# Patient Record
Sex: MALE | Race: WHITE | NOT HISPANIC OR LATINO | Employment: FULL TIME | RURAL
[De-identification: names, ages, dates, MRNs, and addresses within clinical notes are randomized per-mention and may not be internally consistent; named-entity substitution may affect disease eponyms.]

---

## 2017-08-30 ENCOUNTER — CONVERSION ENCOUNTER (OUTPATIENT)
Dept: CARDIOLOGY | Facility: CLINIC | Age: 52
End: 2017-08-30

## 2017-09-07 ENCOUNTER — CONVERSION ENCOUNTER (OUTPATIENT)
Dept: CARDIOLOGY | Facility: CLINIC | Age: 52
End: 2017-09-07

## 2017-09-07 LAB
ALBUMIN SERPL-MCNC: 4.8 G/DL (ref 3.6–5.1)
ALBUMIN/GLOB SERPL: ABNORMAL {RATIO} (ref 1–2.5)
ALP SERPL-CCNC: 62 UNITS/L (ref 40–115)
ALT SERPL-CCNC: 47 UNITS/L (ref 9–46)
AST SERPL-CCNC: 30 UNITS/L (ref 10–35)
BASOPHILS # BLD AUTO: ABNORMAL 10*3/MM3 (ref 0–200)
BASOPHILS NFR BLD AUTO: 0.9 %
BILIRUB SERPL-MCNC: 0.6 MG/DL (ref 0.2–1.2)
BUN SERPL-MCNC: 16 MG/DL (ref 7–25)
BUN/CREAT SERPL: ABNORMAL (ref 6–22)
CALCIUM SERPL-MCNC: 9.2 MG/DL (ref 8.6–10.3)
CHLORIDE SERPL-SCNC: 103 MMOL/L (ref 98–110)
CHOLEST SERPL-MCNC: 209 MG/DL
CHOLEST/HDLC SERPL: ABNORMAL {RATIO}
CO2 CONTENT VENOUS: 28 MMOL/L (ref 20–31)
CONV NEUTROPHILS/100 LEUKOCYTES IN BODY FLUID BY MANUAL COUNT: 64.7 %
CONV TOTAL PROTEIN: 7.2 G/DL (ref 6.1–8.1)
CREAT UR-MCNC: 1.11 MG/DL (ref 0.7–1.33)
EOSINOPHIL # BLD AUTO: 1.5 %
EOSINOPHIL # BLD AUTO: ABNORMAL 10*3/MM3 (ref 15–500)
ERYTHROCYTE [DISTWIDTH] IN BLOOD BY AUTOMATED COUNT: 13.7 % (ref 11–15)
GLOBULIN UR ELPH-MCNC: ABNORMAL G/DL (ref 1.9–3.7)
GLUCOSE SERPL-MCNC: 110 MG/DL (ref 65–99)
HCT VFR BLD AUTO: 43.2 % (ref 38.5–50)
HDLC SERPL-MCNC: 32 MG/DL
HGB BLD-MCNC: 15.5 G/DL (ref 13.2–17.1)
LDLC SERPL CALC-MCNC: ABNORMAL MG/DL
LYMPHOCYTES # BLD AUTO: ABNORMAL 10*3/MM3 (ref 850–3900)
LYMPHOCYTES NFR BLD AUTO: 25.5 %
MCH RBC QN AUTO: 29.8 PG (ref 27–33)
MCHC RBC AUTO-ENTMCNC: ABNORMAL % (ref 32–36)
MCV RBC AUTO: 83.1 FL (ref 80–100)
MONOCYTES # BLD AUTO: ABNORMAL 10*3/MICROLITER (ref 200–950)
MONOCYTES NFR BLD AUTO: 7.4 %
NEUTROPHILS # BLD AUTO: ABNORMAL 10*3/MM3 (ref 1500–7800)
PLATELET # BLD AUTO: ABNORMAL 10*3/MM3 (ref 140–400)
PMV BLD AUTO: 10.1 FL (ref 7.5–12.5)
POTASSIUM SERPL-SCNC: 4.2 MMOL/L (ref 3.5–5.3)
RBC # BLD AUTO: ABNORMAL 10*6/MM3 (ref 4.2–5.8)
SODIUM SERPL-SCNC: 139 MMOL/L (ref 135–146)
TRIGL SERPL-MCNC: 336 MG/DL
WBC # BLD AUTO: ABNORMAL K/UL (ref 3.8–10.8)

## 2017-12-07 ENCOUNTER — HOSPITAL ENCOUNTER (OUTPATIENT)
Dept: OTHER | Facility: HOSPITAL | Age: 52
Discharge: HOME | End: 2017-12-07
Attending: INTERNAL MEDICINE | Admitting: INTERNAL MEDICINE

## 2018-09-12 ENCOUNTER — CONVERSION ENCOUNTER (OUTPATIENT)
Dept: CARDIOLOGY | Facility: CLINIC | Age: 53
End: 2018-09-12

## 2018-10-22 ENCOUNTER — CONVERSION ENCOUNTER (OUTPATIENT)
Dept: FAMILY MEDICINE CLINIC | Facility: CLINIC | Age: 53
End: 2018-10-22

## 2018-10-22 LAB — HBA1C MFR BLD: 6 %

## 2018-12-10 ENCOUNTER — HOSPITAL ENCOUNTER (OUTPATIENT)
Dept: CT IMAGING | Facility: HOSPITAL | Age: 53
Discharge: HOME | End: 2018-12-10
Attending: INTERNAL MEDICINE | Admitting: INTERNAL MEDICINE

## 2018-12-10 LAB
CREAT BLDA-MCNC: 1 MG/DL (ref 0.6–1.3)
CREAT UR-MCNC: 1.2 MG/DL (ref 0.7–1.2)

## 2019-06-04 VITALS
SYSTOLIC BLOOD PRESSURE: 141 MMHG | HEIGHT: 71 IN | DIASTOLIC BLOOD PRESSURE: 82 MMHG | DIASTOLIC BLOOD PRESSURE: 80 MMHG | WEIGHT: 186 LBS | HEART RATE: 77 BPM | WEIGHT: 186 LBS | RESPIRATION RATE: 18 BRPM | HEART RATE: 75 BPM | RESPIRATION RATE: 18 BRPM | SYSTOLIC BLOOD PRESSURE: 150 MMHG | BODY MASS INDEX: 26.04 KG/M2

## 2019-09-11 ENCOUNTER — OFFICE VISIT (OUTPATIENT)
Dept: CARDIOLOGY | Facility: CLINIC | Age: 54
End: 2019-09-11

## 2019-09-11 VITALS
BODY MASS INDEX: 25.48 KG/M2 | DIASTOLIC BLOOD PRESSURE: 83 MMHG | SYSTOLIC BLOOD PRESSURE: 139 MMHG | WEIGHT: 182 LBS | RESPIRATION RATE: 18 BRPM | OXYGEN SATURATION: 99 % | HEART RATE: 84 BPM | HEIGHT: 71 IN

## 2019-09-11 DIAGNOSIS — I35.1 NONRHEUMATIC AORTIC VALVE INSUFFICIENCY: Primary | ICD-10-CM

## 2019-09-11 DIAGNOSIS — I10 ESSENTIAL HYPERTENSION: ICD-10-CM

## 2019-09-11 PROCEDURE — 99214 OFFICE O/P EST MOD 30 MIN: CPT | Performed by: INTERNAL MEDICINE

## 2019-09-11 PROCEDURE — 93000 ELECTROCARDIOGRAM COMPLETE: CPT | Performed by: INTERNAL MEDICINE

## 2019-09-11 RX ORDER — METOPROLOL SUCCINATE 25 MG/1
25 TABLET, EXTENDED RELEASE ORAL DAILY
Refills: 6 | COMMUNITY
Start: 2019-08-28 | End: 2019-10-21 | Stop reason: SDUPTHER

## 2019-09-11 RX ORDER — LISINOPRIL 5 MG/1
5 TABLET ORAL DAILY
Refills: 3 | COMMUNITY
Start: 2019-09-04 | End: 2020-02-25

## 2019-09-11 RX ORDER — ATORVASTATIN CALCIUM 20 MG/1
20 TABLET, FILM COATED ORAL
Refills: 6 | COMMUNITY
Start: 2019-07-31 | End: 2019-10-26 | Stop reason: SDUPTHER

## 2019-09-11 NOTE — PROGRESS NOTES
Subjective:     Encounter Date:09/11/2019      Patient ID: Sami Lin is a 54 y.o. male.    Chief Complaint:    Dear Dr. Mcintosh,    Mr. lin  is a 54-year-old white male with history of aortic valve disorder with aortic insufficiency presents to my office for followup.  Patient is currently stable without symptoms of chest pain or shortness of breath at rest or exertion.  No palpitations But has occasional dizziness  without any syncope.No complaints of any PND or orthopnea.  No swelling of the feet.  Patient has been taking all is medicines.    Problem list  Aortic regurgitation, moderate  Ascending aortic aneurysm  Hypertension  Prior two echocardiograms were reviewed with the patient/aneurysm size on most recent echo 4.3 cm  Moderate aortic insufficiency  CT angiogram of the aorta shows aortic root size of 4.3 cm and ascending aorta of 4.7 cm  Goal systolic blood pressure less than 130  goal LDL less than 70  Continue home blood pressure monitoring    Repeat echocardiogram to assess LV size and severity of the aortic regurgitation  Labs with primary care physician office  follow-up in office in 1 year        The following portions of the patient's history were reviewed and updated as appropriate: allergies, current medications, past family history, past medical history, past social history, past surgical history and problem list.    No Known Allergies      Current Outpatient Medications:   •  atorvastatin (LIPITOR) 20 MG tablet, Take 20 mg by mouth every night at bedtime., Disp: , Rfl: 6  •  lisinopril (PRINIVIL,ZESTRIL) 5 MG tablet, Take 5 mg by mouth Daily., Disp: , Rfl: 3  •  metoprolol succinate XL (TOPROL-XL) 25 MG 24 hr tablet, Take 25 mg by mouth Daily., Disp: , Rfl: 6    Family History   Problem Relation Age of Onset   • Hypertension Mother    • Hypertension Father        Past Medical History:   Diagnosis Date   • Heart murmur    • Hyperlipidemia    • Hypertension        Past Surgical  History:   Procedure Laterality Date   • APPENDECTOMY         Social History     Socioeconomic History   • Marital status:      Spouse name: Not on file   • Number of children: Not on file   • Years of education: Not on file   • Highest education level: Not on file   Tobacco Use   • Smoking status: Former Smoker     Types: Cigarettes   • Smokeless tobacco: Never Used   Substance and Sexual Activity   • Alcohol use: Yes     Comment: Rare    • Drug use: No       Review of Systems   Constitution: Negative for chills, decreased appetite and malaise/fatigue.   HENT: Negative for congestion.    Eyes: Negative for blurred vision and double vision.   Cardiovascular: Negative for chest pain, dyspnea on exertion, irregular heartbeat, leg swelling, near-syncope, orthopnea, palpitations, paroxysmal nocturnal dyspnea and syncope.   Respiratory: Negative for cough and shortness of breath.    Hematologic/Lymphatic: Negative for adenopathy. Does not bruise/bleed easily.   Skin: Negative for rash.   Gastrointestinal: Negative for bloating and abdominal pain.   Neurological: Negative for dizziness and focal weakness.            Objective:         Vitals:    09/11/19 1318   BP: 139/83   Pulse: 84   Resp: 18   SpO2: 99%       Physical Exam   Constitutional: He is oriented to person, place, and time. He appears well-developed and well-nourished.   HENT:   Head: Normocephalic and atraumatic.   Eyes: Conjunctivae are normal. Pupils are equal, round, and reactive to light.   Neck: Normal range of motion. Neck supple. No thyromegaly present.   Cardiovascular: Normal rate, regular rhythm, S1 normal, S2 normal and intact distal pulses.   Pulmonary/Chest: Effort normal and breath sounds normal.   Abdominal: Soft. Bowel sounds are normal.   Musculoskeletal: He exhibits no edema.   Neurological: He is alert and oriented to person, place, and time.   Skin: Skin is warm.   Nursing note and vitals reviewed.        EKG: normal EKG, normal  sinus rhythm, unchanged from previous tracings, normal sinus rhythm, nonspecific ST and T waves changes.

## 2019-10-21 RX ORDER — METOPROLOL SUCCINATE 25 MG/1
TABLET, EXTENDED RELEASE ORAL
Qty: 30 TABLET | Refills: 6 | Status: SHIPPED | OUTPATIENT
Start: 2019-10-21 | End: 2020-04-06

## 2019-10-28 RX ORDER — ATORVASTATIN CALCIUM 20 MG/1
TABLET, FILM COATED ORAL
Qty: 90 TABLET | Refills: 3 | Status: SHIPPED | OUTPATIENT
Start: 2019-10-28 | End: 2020-10-15 | Stop reason: SDUPTHER

## 2019-12-09 ENCOUNTER — HOSPITAL ENCOUNTER (OUTPATIENT)
Dept: CARDIOLOGY | Facility: HOSPITAL | Age: 54
Discharge: HOME | End: 2019-12-09
Admitting: INTERNAL MEDICINE

## 2019-12-09 VITALS — HEIGHT: 71 IN | WEIGHT: 182 LBS | BODY MASS INDEX: 25.48 KG/M2

## 2019-12-09 DIAGNOSIS — I35.1 NONRHEUMATIC AORTIC VALVE INSUFFICIENCY: ICD-10-CM

## 2019-12-09 LAB
BH CV ECHO MEAS - ACS: 1.8 CM
BH CV ECHO MEAS - AI DEC SLOPE: 276.2 CM/SEC^2
BH CV ECHO MEAS - AI DEC TIME: 1.7 SEC
BH CV ECHO MEAS - AI MAX PG: 87.1 MMHG
BH CV ECHO MEAS - AI MAX VEL: 466.1 CM/SEC
BH CV ECHO MEAS - AI P1/2T: 494.4 MSEC
BH CV ECHO MEAS - AO MAX PG (FULL): 8.2 MMHG
BH CV ECHO MEAS - AO MAX PG: 13.4 MMHG
BH CV ECHO MEAS - AO MEAN PG (FULL): 3 MMHG
BH CV ECHO MEAS - AO MEAN PG: 6.2 MMHG
BH CV ECHO MEAS - AO ROOT AREA (BSA CORRECTED): 2.1
BH CV ECHO MEAS - AO ROOT AREA: 13.8 CM^2
BH CV ECHO MEAS - AO ROOT DIAM: 4.2 CM
BH CV ECHO MEAS - AO V2 MAX: 182.9 CM/SEC
BH CV ECHO MEAS - AO V2 MEAN: 115 CM/SEC
BH CV ECHO MEAS - AO V2 VTI: 38.7 CM
BH CV ECHO MEAS - AVA(I,A): 2.7 CM^2
BH CV ECHO MEAS - AVA(I,D): 2.7 CM^2
BH CV ECHO MEAS - AVA(V,A): 2.1 CM^2
BH CV ECHO MEAS - AVA(V,D): 2.1 CM^2
BH CV ECHO MEAS - BSA(HAYCOCK): 2 M^2
BH CV ECHO MEAS - BSA: 2 M^2
BH CV ECHO MEAS - BZI_BMI: 25.4 KILOGRAMS/M^2
BH CV ECHO MEAS - BZI_METRIC_HEIGHT: 180.3 CM
BH CV ECHO MEAS - BZI_METRIC_WEIGHT: 82.6 KG
BH CV ECHO MEAS - EDV(CUBED): 158.5 ML
BH CV ECHO MEAS - EDV(MOD-SP4): 108.5 ML
BH CV ECHO MEAS - EDV(TEICH): 142 ML
BH CV ECHO MEAS - EF(CUBED): 71.5 %
BH CV ECHO MEAS - EF(MOD-BP): 60 %
BH CV ECHO MEAS - EF(MOD-SP4): 60.1 %
BH CV ECHO MEAS - EF(TEICH): 62.7 %
BH CV ECHO MEAS - ESV(CUBED): 45.1 ML
BH CV ECHO MEAS - ESV(MOD-SP4): 43.3 ML
BH CV ECHO MEAS - ESV(TEICH): 53 ML
BH CV ECHO MEAS - FS: 34.2 %
BH CV ECHO MEAS - IVS/LVPW: 1
BH CV ECHO MEAS - IVSD: 1 CM
BH CV ECHO MEAS - LA DIMENSION: 2.2 CM
BH CV ECHO MEAS - LA/AO: 0.52
BH CV ECHO MEAS - LV DIASTOLIC VOL/BSA (35-75): 53.6 ML/M^2
BH CV ECHO MEAS - LV MASS(C)D: 203.4 GRAMS
BH CV ECHO MEAS - LV MASS(C)DI: 100.4 GRAMS/M^2
BH CV ECHO MEAS - LV MAX PG: 5.2 MMHG
BH CV ECHO MEAS - LV MEAN PG: 3.3 MMHG
BH CV ECHO MEAS - LV SYSTOLIC VOL/BSA (12-30): 21.4 ML/M^2
BH CV ECHO MEAS - LV V1 MAX: 114.3 CM/SEC
BH CV ECHO MEAS - LV V1 MEAN: 84.8 CM/SEC
BH CV ECHO MEAS - LV V1 VTI: 31 CM
BH CV ECHO MEAS - LVIDD: 5.4 CM
BH CV ECHO MEAS - LVIDS: 3.6 CM
BH CV ECHO MEAS - LVOT AREA: 3.4 CM^2
BH CV ECHO MEAS - LVOT DIAM: 2.1 CM
BH CV ECHO MEAS - LVPWD: 0.96 CM
BH CV ECHO MEAS - MR MAX PG: 35.9 MMHG
BH CV ECHO MEAS - MR MAX VEL: 298.3 CM/SEC
BH CV ECHO MEAS - MV A MAX VEL: 88.4 CM/SEC
BH CV ECHO MEAS - MV E MAX VEL: 108 CM/SEC
BH CV ECHO MEAS - MV E/A: 1.2
BH CV ECHO MEAS - MV MAX PG: 3.3 MMHG
BH CV ECHO MEAS - MV MEAN PG: 1.4 MMHG
BH CV ECHO MEAS - MV V2 MAX: 91.2 CM/SEC
BH CV ECHO MEAS - MV V2 MEAN: 53.9 CM/SEC
BH CV ECHO MEAS - MV V2 VTI: 29.3 CM
BH CV ECHO MEAS - MVA(VTI): 3.6 CM^2
BH CV ECHO MEAS - PA ACC TIME: 0.12 SEC
BH CV ECHO MEAS - PA MAX PG: 4.1 MMHG
BH CV ECHO MEAS - PA PR(ACCEL): 26.3 MMHG
BH CV ECHO MEAS - PA V2 MAX: 101.1 CM/SEC
BH CV ECHO MEAS - PI END-D VEL: 80.2 CM/SEC
BH CV ECHO MEAS - RAP SYSTOLE: 10 MMHG
BH CV ECHO MEAS - RVDD(2D): 1.7 CM
BH CV ECHO MEAS - RVDD: 1.7 CM
BH CV ECHO MEAS - RVSP: 49.5 MMHG
BH CV ECHO MEAS - SI(AO): 263.6 ML/M^2
BH CV ECHO MEAS - SI(CUBED): 56 ML/M^2
BH CV ECHO MEAS - SI(LVOT): 52.3 ML/M^2
BH CV ECHO MEAS - SI(MOD-SP4): 32.2 ML/M^2
BH CV ECHO MEAS - SI(TEICH): 44 ML/M^2
BH CV ECHO MEAS - SV(AO): 534.1 ML
BH CV ECHO MEAS - SV(CUBED): 113.4 ML
BH CV ECHO MEAS - SV(LVOT): 106 ML
BH CV ECHO MEAS - SV(MOD-SP4): 65.2 ML
BH CV ECHO MEAS - SV(TEICH): 89.1 ML
BH CV ECHO MEAS - TR MAX VEL: 269.3 CM/SEC
LV EF 2D ECHO EST: 60 %
MAXIMAL PREDICTED HEART RATE: 167 BPM
STRESS TARGET HR: 142 BPM

## 2019-12-09 PROCEDURE — 93306 TTE W/DOPPLER COMPLETE: CPT | Performed by: INTERNAL MEDICINE

## 2019-12-09 PROCEDURE — 93306 TTE W/DOPPLER COMPLETE: CPT

## 2020-02-25 RX ORDER — LISINOPRIL 5 MG/1
TABLET ORAL
Qty: 90 TABLET | Refills: 3 | Status: SHIPPED | OUTPATIENT
Start: 2020-02-25 | End: 2021-02-11

## 2020-04-06 RX ORDER — METOPROLOL SUCCINATE 25 MG/1
TABLET, EXTENDED RELEASE ORAL
Qty: 30 TABLET | Refills: 6 | Status: SHIPPED | OUTPATIENT
Start: 2020-04-06 | End: 2020-09-29

## 2020-09-16 ENCOUNTER — OFFICE VISIT (OUTPATIENT)
Dept: CARDIOLOGY | Facility: CLINIC | Age: 55
End: 2020-09-16

## 2020-09-16 VITALS
HEIGHT: 71 IN | RESPIRATION RATE: 18 BRPM | OXYGEN SATURATION: 100 % | DIASTOLIC BLOOD PRESSURE: 79 MMHG | HEART RATE: 84 BPM | BODY MASS INDEX: 25.48 KG/M2 | SYSTOLIC BLOOD PRESSURE: 139 MMHG | WEIGHT: 182 LBS

## 2020-09-16 DIAGNOSIS — I71.20 THORACIC AORTIC ANEURYSM WITHOUT RUPTURE (HCC): ICD-10-CM

## 2020-09-16 DIAGNOSIS — I35.1 NONRHEUMATIC AORTIC VALVE INSUFFICIENCY: Primary | ICD-10-CM

## 2020-09-16 DIAGNOSIS — E78.2 MIXED HYPERLIPIDEMIA: ICD-10-CM

## 2020-09-16 DIAGNOSIS — R01.1 HEART MURMUR: ICD-10-CM

## 2020-09-16 DIAGNOSIS — I10 ESSENTIAL HYPERTENSION: ICD-10-CM

## 2020-09-16 PROCEDURE — 93000 ELECTROCARDIOGRAM COMPLETE: CPT | Performed by: INTERNAL MEDICINE

## 2020-09-16 PROCEDURE — 99214 OFFICE O/P EST MOD 30 MIN: CPT | Performed by: INTERNAL MEDICINE

## 2020-09-16 NOTE — PROGRESS NOTES
"Cardiology Office Visit      Encounter Date:  09/16/2020    Patient ID:   Sami Quiroga is a 55 y.o. male.    Reason For Followup:  Aortic regurgitation and ascending aortic aneurysm    Brief Clinical History:  Dear Dr. Mcintosh, Renard Pope MD    I had the pleasure of seeing Sami Quiroga today. As you are well aware, this is a 55 y.o. male  with history of aortic valve disorder with aortic insufficiency presents to my office for followup.  Patient is currently stable without symptoms of chest pain or shortness of breath at rest or exertion.  No palpitations But has occasional dizziness  without any syncope.No complaints of any PND or orthopnea.  No swelling of the feet.  Patient has been taking all is medicines.        Interval History:  Denies any symptoms of chest pain shortness of breath dizziness or syncope    Assessment & Plan    Impressions:  Aortic regurgitation, moderate  Ascending aortic aneurysm  Hypertension  Prior two echocardiograms were reviewed with the patient/aneurysm size on most recent echo 4.3 cm  Moderate aortic insufficiency  CT angiogram of the aorta shows aortic root size of 4.3 cm and ascending aorta of 4.7 cm    Recommendations:  Goal systolic blood pressure less than 130  goal LDL less than 70  Continue home blood pressure monitoring  Advised patient to call back office in 2 weeks with blood pressure readings  Consider increasing the dose of lisinopril if the blood pressure is still elevated  Repeat echocardiogram to assess LV size and severity of the aortic regurgitation  Labs with primary care physician office  follow-up in office in 1 year  Objective:    Vitals:  Vitals:    09/16/20 1300   BP: 139/79   BP Location: Left arm   Pulse: 84   Resp: 18   SpO2: 100%   Weight: 82.6 kg (182 lb)   Height: 180.3 cm (71\")       Physical Exam:    General: Alert, cooperative, no distress, appears stated age  Head:  Normocephalic, atraumatic, mucous membranes moist  Eyes:  Conjunctiva/corneas " clear, EOM's intact     Neck:  Supple,  no adenopathy;      Lungs: Clear to auscultation bilaterally, no wheezes rhonchi rales are noted  Chest wall: No tenderness  Heart::  Regular rate and rhythm, S1 and S2 normal, 2 x 6 diastolic heart murmur left sternal border  Abdomen: Soft, non-tender, nondistended bowel sounds active  Extremities: No cyanosis, clubbing, or edema  Pulses: 2+ and symmetric all extremities  Skin:  No rashes or lesions  Neuro/psych: A&O x3. CN II through XII are grossly intact with appropriate affect      Allergies:  No Known Allergies    Medication Review:     Current Outpatient Medications:   •  atorvastatin (LIPITOR) 20 MG tablet, TAKE 1 TABLET BY MOUTH AT BEDTIME, Disp: 90 tablet, Rfl: 3  •  lisinopril (PRINIVIL,ZESTRIL) 5 MG tablet, TAKE 1 TABLET BY MOUTH EVERY DAY, Disp: 90 tablet, Rfl: 3  •  metoprolol succinate XL (TOPROL-XL) 25 MG 24 hr tablet, TAKE 1 TABLET BY MOUTH EVERY DAY, Disp: 30 tablet, Rfl: 6    Family History:  Family History   Problem Relation Age of Onset   • Hypertension Mother    • Hypertension Father        Past Medical History:  Past Medical History:   Diagnosis Date   • Heart murmur    • Hyperlipidemia    • Hypertension        Past surgical History:  Past Surgical History:   Procedure Laterality Date   • APPENDECTOMY         Social History:  Social History     Socioeconomic History   • Marital status:      Spouse name: Not on file   • Number of children: Not on file   • Years of education: Not on file   • Highest education level: Not on file   Tobacco Use   • Smoking status: Former Smoker     Types: Cigarettes   • Smokeless tobacco: Never Used   Substance and Sexual Activity   • Alcohol use: Yes     Comment: Rare    • Drug use: No       Review of Systems:  The following systems were reviewed as they relate to the cardiovascular system: Constitutional, Eyes, ENT, Cardiovascular, Respiratory, Gastrointestinal, Integumentary, Neurological, Psychiatric, Hematologic,  Endocrine, Musculoskeletal, and Genitourinary. The pertinent cardiovascular findings are reported above with all other cardiovascular points within those systems being negative.    Diagnostic Study Review:     Current Electrocardiogram:    ECG 12 Lead    Date/Time: 9/16/2020 7:36 PM  Performed by: Brittanie Lopez MD  Authorized by: Brittanie Lopez MD   Comparison: compared with previous ECG   Similar to previous ECG  Rhythm: sinus rhythm  Rate: normal  BPM: 84  Conduction: conduction normal  QRS axis: normal  Other findings: non-specific ST-T wave changes    Clinical impression: abnormal EKG              NOTE: The following portions of the patient's history were reviewed and updated this visit as appropriate: allergies, current medications, past family history, past medical history, past social history, past surgical history and problem list.

## 2020-09-28 PROBLEM — I77.810 AORTIC ROOT DILATION: Status: ACTIVE | Noted: 2020-09-28

## 2020-09-28 PROBLEM — R35.1 NOCTURIA: Status: ACTIVE | Noted: 2020-09-28

## 2020-09-28 PROBLEM — J30.9 ALLERGIC RHINITIS: Status: ACTIVE | Noted: 2020-09-28

## 2020-09-28 PROBLEM — F41.9 ANXIETY: Status: ACTIVE | Noted: 2020-09-28

## 2020-09-29 RX ORDER — METOPROLOL SUCCINATE 25 MG/1
TABLET, EXTENDED RELEASE ORAL
Qty: 90 TABLET | Refills: 2 | Status: SHIPPED | OUTPATIENT
Start: 2020-09-29 | End: 2021-08-20

## 2020-10-05 ENCOUNTER — OFFICE VISIT (OUTPATIENT)
Dept: FAMILY MEDICINE CLINIC | Facility: CLINIC | Age: 55
End: 2020-10-05

## 2020-10-05 VITALS
DIASTOLIC BLOOD PRESSURE: 82 MMHG | SYSTOLIC BLOOD PRESSURE: 130 MMHG | BODY MASS INDEX: 25.9 KG/M2 | HEART RATE: 79 BPM | RESPIRATION RATE: 18 BRPM | OXYGEN SATURATION: 98 % | HEIGHT: 71 IN | TEMPERATURE: 97.3 F | WEIGHT: 185 LBS

## 2020-10-05 DIAGNOSIS — Z12.5 ENCOUNTER FOR PROSTATE CANCER SCREENING: ICD-10-CM

## 2020-10-05 DIAGNOSIS — I10 ESSENTIAL HYPERTENSION: ICD-10-CM

## 2020-10-05 DIAGNOSIS — E78.2 MIXED HYPERLIPIDEMIA: ICD-10-CM

## 2020-10-05 DIAGNOSIS — R73.9 HYPERGLYCEMIA: ICD-10-CM

## 2020-10-05 DIAGNOSIS — Z00.00 PHYSICAL EXAM: Primary | ICD-10-CM

## 2020-10-05 LAB
BILIRUB BLD-MCNC: NEGATIVE MG/DL
CLARITY, POC: CLEAR
COLOR UR: YELLOW
GLUCOSE UR STRIP-MCNC: NEGATIVE MG/DL
KETONES UR QL: NEGATIVE
LEUKOCYTE EST, POC: NEGATIVE
NITRITE UR-MCNC: NEGATIVE MG/ML
PH UR: 5 [PH] (ref 5–8)
PROT UR STRIP-MCNC: NEGATIVE MG/DL
RBC # UR STRIP: NEGATIVE /UL
SP GR UR: 1.01 (ref 1–1.03)
UROBILINOGEN UR QL: NORMAL

## 2020-10-05 PROCEDURE — 99396 PREV VISIT EST AGE 40-64: CPT | Performed by: FAMILY MEDICINE

## 2020-10-05 PROCEDURE — 81003 URINALYSIS AUTO W/O SCOPE: CPT | Performed by: FAMILY MEDICINE

## 2020-10-05 NOTE — PROGRESS NOTES
Subjective   Sami Quiroga is a 55 y.o. male.     The patient is here: for coordination of medical care and annual wellness examination.  Patient's last comprehensive physical was on 10/15/18.  Patient's previous physician was Renard Mcintosh MD.  Patient has: moderate activity with work/home activities, exercises 2 - 3 times per week, good appetite, feels well with no complaints, good energy level and is sleeping well.  Patient's last tetanus shot was unknown. Patient's last colonoscopy was: Declines.    Hypertension  The current episode started more than 1 year ago. Pertinent negatives include no chest pain, neck pain or shortness of breath. Current antihypertension treatment includes ACE inhibitors and beta blockers.   Hyperlipidemia  The current episode started more than 1 year ago. Pertinent negatives include no chest pain or shortness of breath. Current antihyperlipidemic treatment includes statins.        The following portions of the patient's history were reviewed and updated as appropriate: allergies, current medications, past family history, past medical history, past social history, past surgical history and problem list.    Patient Active Problem List   Diagnosis   • Heart murmur   • Hypertension   • Hyperlipidemia   • Nonrheumatic aortic valve insufficiency   • Thoracic aortic aneurysm without rupture (CMS/HCC)   • Thoracic aortic ectasia (CMS/HCC)   • Nocturia   • Aortic valve disorder   • Aortic root dilation (CMS/HCC)   • Anxiety   • Allergic rhinitis       Current Outpatient Medications on File Prior to Visit   Medication Sig Dispense Refill   • atorvastatin (LIPITOR) 20 MG tablet TAKE 1 TABLET BY MOUTH AT BEDTIME 90 tablet 3   • lisinopril (PRINIVIL,ZESTRIL) 5 MG tablet TAKE 1 TABLET BY MOUTH EVERY DAY 90 tablet 3   • metoprolol succinate XL (TOPROL-XL) 25 MG 24 hr tablet TAKE 1 TABLET BY MOUTH EVERY DAY 90 tablet 2     No current facility-administered medications on file prior to visit.       Current outpatient and discharge medications have been reconciled for the patient.  Reviewed by: Renard Mcintosh MD      No Known Allergies    Review of Systems   Constitutional: Negative for activity change, appetite change, fatigue and fever.   HENT: Negative for ear pain, swollen glands and voice change.    Eyes: Negative for visual disturbance.   Respiratory: Negative for shortness of breath and wheezing.    Cardiovascular: Negative for chest pain and leg swelling.   Gastrointestinal: Negative for abdominal pain, blood in stool, constipation, diarrhea, nausea and vomiting.   Endocrine: Negative for polydipsia and polyuria.   Genitourinary: Negative for dysuria, frequency and hematuria.   Musculoskeletal: Negative for joint swelling, neck pain and neck stiffness.   Skin: Negative for rash and bruise.   Neurological: Negative for weakness, numbness and headache.   Psychiatric/Behavioral: Negative for suicidal ideas and depressed mood.     I have reviewed and confirmed the accuracy of the ROS as documented by the MA/LPN/RN Renard Mcintosh MD      Objective   Vitals:    10/05/20 0824   BP: 130/82   Pulse: 79   Resp: 18   Temp: 97.3 °F (36.3 °C)   SpO2: 98%     Physical Exam  Constitutional:       Appearance: He is well-developed.   HENT:      Head: Normocephalic and atraumatic.      Right Ear: External ear normal.      Left Ear: External ear normal.      Nose: Nose normal.   Eyes:      Pupils: Pupils are equal, round, and reactive to light.   Neck:      Musculoskeletal: Normal range of motion and neck supple.   Cardiovascular:      Rate and Rhythm: Normal rate and regular rhythm.      Heart sounds: Normal heart sounds.   Pulmonary:      Effort: Pulmonary effort is normal.      Breath sounds: Normal breath sounds.   Abdominal:      General: Bowel sounds are normal.      Palpations: Abdomen is soft.   Musculoskeletal: Normal range of motion.   Skin:     General: Skin is warm and dry.   Neurological:       Mental Status: He is alert and oriented to person, place, and time.   Psychiatric:         Behavior: Behavior normal.         Thought Content: Thought content normal.         Judgment: Judgment normal.         I wore protective equipment throughout this patient encounter to include mask and eye protection. Hand hygiene was performed before donning protective equipment and after removal when leaving the room.    Assessment/Plan .  Sami was seen today for annual exam, hypertension and hyperlipidemia.    Diagnoses and all orders for this visit:    Physical exam  -     POC Urinalysis Dipstick, Automated  -     Lipid Panel With / Chol / HDL Ratio  -     CBC Auto Differential  -     Comprehensive Metabolic Panel  -     Hemoglobin A1c  -     PSA Screen    Encounter for prostate cancer screening  -     PSA Screen    Mixed hyperlipidemia  -     Lipid Panel With / Chol / HDL Ratio  -     Comprehensive Metabolic Panel    Essential hypertension    Hyperglycemia  -     CBC Auto Differential  -     Hemoglobin A1c

## 2020-10-06 ENCOUNTER — TELEPHONE (OUTPATIENT)
Dept: FAMILY MEDICINE CLINIC | Facility: CLINIC | Age: 55
End: 2020-10-06

## 2020-10-06 LAB
ALBUMIN SERPL-MCNC: 5.2 G/DL (ref 3.8–4.9)
ALBUMIN/GLOB SERPL: 2.1 {RATIO} (ref 1.2–2.2)
ALP SERPL-CCNC: 82 IU/L (ref 39–117)
ALT SERPL-CCNC: 46 IU/L (ref 0–44)
AST SERPL-CCNC: 24 IU/L (ref 0–40)
BASOPHILS # BLD AUTO: 0 X10E3/UL (ref 0–0.2)
BASOPHILS NFR BLD AUTO: 1 %
BILIRUB SERPL-MCNC: 0.4 MG/DL (ref 0–1.2)
BUN SERPL-MCNC: 17 MG/DL (ref 6–24)
BUN/CREAT SERPL: 17 (ref 9–20)
CALCIUM SERPL-MCNC: 9.5 MG/DL (ref 8.7–10.2)
CHLORIDE SERPL-SCNC: 105 MMOL/L (ref 96–106)
CHOLEST SERPL-MCNC: 138 MG/DL (ref 100–199)
CHOLEST/HDLC SERPL: 3.2 RATIO (ref 0–5)
CO2 SERPL-SCNC: 24 MMOL/L (ref 20–29)
CREAT SERPL-MCNC: 1.01 MG/DL (ref 0.76–1.27)
EOSINOPHIL # BLD AUTO: 0.1 X10E3/UL (ref 0–0.4)
EOSINOPHIL NFR BLD AUTO: 1 %
ERYTHROCYTE [DISTWIDTH] IN BLOOD BY AUTOMATED COUNT: 13 % (ref 11.6–15.4)
GLOBULIN SER CALC-MCNC: 2.5 G/DL (ref 1.5–4.5)
GLUCOSE SERPL-MCNC: 140 MG/DL (ref 65–99)
HBA1C MFR BLD: 5.8 % (ref 4.8–5.6)
HCT VFR BLD AUTO: 45.6 % (ref 37.5–51)
HDLC SERPL-MCNC: 43 MG/DL
HGB BLD-MCNC: 15.6 G/DL (ref 13–17.7)
IMM GRANULOCYTES # BLD AUTO: 0 X10E3/UL (ref 0–0.1)
IMM GRANULOCYTES NFR BLD AUTO: 0 %
LDLC SERPL CALC-MCNC: 77 MG/DL (ref 0–99)
LYMPHOCYTES # BLD AUTO: 1 X10E3/UL (ref 0.7–3.1)
LYMPHOCYTES NFR BLD AUTO: 16 %
MCH RBC QN AUTO: 29.3 PG (ref 26.6–33)
MCHC RBC AUTO-ENTMCNC: 34.2 G/DL (ref 31.5–35.7)
MCV RBC AUTO: 86 FL (ref 79–97)
MONOCYTES # BLD AUTO: 0.4 X10E3/UL (ref 0.1–0.9)
MONOCYTES NFR BLD AUTO: 7 %
NEUTROPHILS # BLD AUTO: 5 X10E3/UL (ref 1.4–7)
NEUTROPHILS NFR BLD AUTO: 75 %
PLATELET # BLD AUTO: 194 X10E3/UL (ref 150–450)
POTASSIUM SERPL-SCNC: 4.9 MMOL/L (ref 3.5–5.2)
PROT SERPL-MCNC: 7.7 G/DL (ref 6–8.5)
PSA SERPL-MCNC: 3.2 NG/ML (ref 0–4)
RBC # BLD AUTO: 5.33 X10E6/UL (ref 4.14–5.8)
SODIUM SERPL-SCNC: 140 MMOL/L (ref 134–144)
TRIGL SERPL-MCNC: 95 MG/DL (ref 0–149)
VLDLC SERPL CALC-MCNC: 18 MG/DL (ref 5–40)
WBC # BLD AUTO: 6.6 X10E3/UL (ref 3.4–10.8)

## 2020-10-06 NOTE — TELEPHONE ENCOUNTER
----- Message from Renard Mcintosh MD sent at 10/6/2020 10:15 AM EDT -----  Please patient that labs were normal. Fasting sugar slightly elevated, recommend watch carbs

## 2020-10-15 DIAGNOSIS — E78.2 MIXED HYPERLIPIDEMIA: Primary | ICD-10-CM

## 2020-10-15 RX ORDER — ATORVASTATIN CALCIUM 20 MG/1
20 TABLET, FILM COATED ORAL
Qty: 90 TABLET | Refills: 0 | Status: SHIPPED | OUTPATIENT
Start: 2020-10-15 | End: 2021-01-07

## 2020-10-15 NOTE — TELEPHONE ENCOUNTER
Patient is requesting a refill of Atorvastatin 20mg to be sent to [facility]

## 2020-10-19 RX ORDER — ATORVASTATIN CALCIUM 20 MG/1
TABLET, FILM COATED ORAL
Qty: 90 TABLET | Refills: 3 | Status: SHIPPED | OUTPATIENT
Start: 2020-10-19 | End: 2021-04-04

## 2020-10-27 ENCOUNTER — TELEPHONE (OUTPATIENT)
Dept: CARDIOLOGY | Facility: CLINIC | Age: 55
End: 2020-10-27

## 2020-10-27 NOTE — TELEPHONE ENCOUNTER
Called and informed the Pt per Dr. Mcdaniel that he reviewed his BP readings that he dropped off at the office and that Dr. Mcdaniel stated that his readings look good and to continue the same medications. Pt stated understanding.

## 2020-12-07 ENCOUNTER — HOSPITAL ENCOUNTER (OUTPATIENT)
Dept: CARDIOLOGY | Facility: HOSPITAL | Age: 55
Discharge: HOME | End: 2020-12-07
Admitting: INTERNAL MEDICINE

## 2020-12-07 VITALS — HEIGHT: 71 IN | WEIGHT: 185 LBS | BODY MASS INDEX: 25.9 KG/M2

## 2020-12-07 DIAGNOSIS — I35.1 NONRHEUMATIC AORTIC VALVE INSUFFICIENCY: ICD-10-CM

## 2020-12-07 DIAGNOSIS — I10 ESSENTIAL HYPERTENSION: ICD-10-CM

## 2020-12-07 PROCEDURE — 93306 TTE W/DOPPLER COMPLETE: CPT

## 2020-12-07 PROCEDURE — 93306 TTE W/DOPPLER COMPLETE: CPT | Performed by: INTERNAL MEDICINE

## 2020-12-08 LAB
BH CV ECHO MEAS - ACS: 1.7 CM
BH CV ECHO MEAS - AI DEC SLOPE: 255.1 CM/SEC^2
BH CV ECHO MEAS - AI DEC TIME: 1.9 SEC
BH CV ECHO MEAS - AI MAX PG: 86.6 MMHG
BH CV ECHO MEAS - AI MAX VEL: 464.4 CM/SEC
BH CV ECHO MEAS - AI P1/2T: 533.3 MSEC
BH CV ECHO MEAS - AO ARCH DIAM (PROXIMAL TRANS.): 3.1 CM
BH CV ECHO MEAS - AO MAX PG (FULL): 9.1 MMHG
BH CV ECHO MEAS - AO MAX PG: 14.3 MMHG
BH CV ECHO MEAS - AO MEAN PG (FULL): 3.9 MMHG
BH CV ECHO MEAS - AO MEAN PG: 6.8 MMHG
BH CV ECHO MEAS - AO ROOT AREA (BSA CORRECTED): 2.1
BH CV ECHO MEAS - AO ROOT AREA: 14.3 CM^2
BH CV ECHO MEAS - AO ROOT DIAM: 4.3 CM
BH CV ECHO MEAS - AO V2 MAX: 188.8 CM/SEC
BH CV ECHO MEAS - AO V2 MEAN: 120.1 CM/SEC
BH CV ECHO MEAS - AO V2 VTI: 41.2 CM
BH CV ECHO MEAS - AVA(I,A): 2.8 CM^2
BH CV ECHO MEAS - AVA(I,D): 2.8 CM^2
BH CV ECHO MEAS - AVA(V,A): 2.4 CM^2
BH CV ECHO MEAS - AVA(V,D): 2.4 CM^2
BH CV ECHO MEAS - BSA(HAYCOCK): 2.1 M^2
BH CV ECHO MEAS - BSA: 2 M^2
BH CV ECHO MEAS - BZI_BMI: 25.8 KILOGRAMS/M^2
BH CV ECHO MEAS - BZI_METRIC_HEIGHT: 180.3 CM
BH CV ECHO MEAS - BZI_METRIC_WEIGHT: 83.9 KG
BH CV ECHO MEAS - EDV(CUBED): 149.6 ML
BH CV ECHO MEAS - EDV(MOD-SP4): 106.6 ML
BH CV ECHO MEAS - EDV(TEICH): 135.8 ML
BH CV ECHO MEAS - EF(CUBED): 69.3 %
BH CV ECHO MEAS - EF(MOD-BP): 60 %
BH CV ECHO MEAS - EF(MOD-SP4): 60 %
BH CV ECHO MEAS - EF(TEICH): 60.4 %
BH CV ECHO MEAS - ESV(CUBED): 45.9 ML
BH CV ECHO MEAS - ESV(MOD-SP4): 31.9 ML
BH CV ECHO MEAS - ESV(TEICH): 53.8 ML
BH CV ECHO MEAS - FS: 32.5 %
BH CV ECHO MEAS - IVS/LVPW: 0.97
BH CV ECHO MEAS - IVSD: 0.96 CM
BH CV ECHO MEAS - LA DIMENSION: 2.6 CM
BH CV ECHO MEAS - LA/AO: 0.6
BH CV ECHO MEAS - LV DIASTOLIC VOL/BSA (35-75): 52.2 ML/M^2
BH CV ECHO MEAS - LV MASS(C)D: 194.7 GRAMS
BH CV ECHO MEAS - LV MASS(C)DI: 95.4 GRAMS/M^2
BH CV ECHO MEAS - LV MAX PG: 5.2 MMHG
BH CV ECHO MEAS - LV MEAN PG: 2.9 MMHG
BH CV ECHO MEAS - LV SYSTOLIC VOL/BSA (12-30): 15.6 ML/M^2
BH CV ECHO MEAS - LV V1 MAX: 114 CM/SEC
BH CV ECHO MEAS - LV V1 MEAN: 79.3 CM/SEC
BH CV ECHO MEAS - LV V1 VTI: 28.8 CM
BH CV ECHO MEAS - LVIDD: 5.3 CM
BH CV ECHO MEAS - LVIDS: 3.6 CM
BH CV ECHO MEAS - LVOT AREA: 4 CM^2
BH CV ECHO MEAS - LVOT DIAM: 2.3 CM
BH CV ECHO MEAS - LVPWD: 0.99 CM
BH CV ECHO MEAS - MR MAX PG: 60.4 MMHG
BH CV ECHO MEAS - MR MAX VEL: 388.6 CM/SEC
BH CV ECHO MEAS - MV A MAX VEL: 63.9 CM/SEC
BH CV ECHO MEAS - MV DEC SLOPE: 359.2 CM/SEC^2
BH CV ECHO MEAS - MV DEC TIME: 0.25 SEC
BH CV ECHO MEAS - MV E MAX VEL: 89.9 CM/SEC
BH CV ECHO MEAS - MV E/A: 1.4
BH CV ECHO MEAS - MV MAX PG: 4.5 MMHG
BH CV ECHO MEAS - MV MEAN PG: 1.9 MMHG
BH CV ECHO MEAS - MV V2 MAX: 106.6 CM/SEC
BH CV ECHO MEAS - MV V2 MEAN: 62.2 CM/SEC
BH CV ECHO MEAS - MV V2 VTI: 34.1 CM
BH CV ECHO MEAS - MVA(VTI): 3.4 CM^2
BH CV ECHO MEAS - PA ACC TIME: 0.16 SEC
BH CV ECHO MEAS - PA MAX PG: 4.4 MMHG
BH CV ECHO MEAS - PA PR(ACCEL): 8.4 MMHG
BH CV ECHO MEAS - PA V2 MAX: 105.4 CM/SEC
BH CV ECHO MEAS - PI END-D VEL: 81.7 CM/SEC
BH CV ECHO MEAS - PULM A REVS DUR: 0.14 SEC
BH CV ECHO MEAS - PULM A REVS VEL: 30.3 CM/SEC
BH CV ECHO MEAS - PULM DIAS VEL: 50.2 CM/SEC
BH CV ECHO MEAS - PULM S/D: 1
BH CV ECHO MEAS - PULM SYS VEL: 51 CM/SEC
BH CV ECHO MEAS - RAP SYSTOLE: 10 MMHG
BH CV ECHO MEAS - RVSP: 40.8 MMHG
BH CV ECHO MEAS - SI(AO): 288.9 ML/M^2
BH CV ECHO MEAS - SI(CUBED): 50.8 ML/M^2
BH CV ECHO MEAS - SI(LVOT): 56.8 ML/M^2
BH CV ECHO MEAS - SI(MOD-SP4): 36.6 ML/M^2
BH CV ECHO MEAS - SI(TEICH): 40.2 ML/M^2
BH CV ECHO MEAS - SV(AO): 589.4 ML
BH CV ECHO MEAS - SV(CUBED): 103.7 ML
BH CV ECHO MEAS - SV(LVOT): 116 ML
BH CV ECHO MEAS - SV(MOD-SP4): 74.7 ML
BH CV ECHO MEAS - SV(TEICH): 82.1 ML
BH CV ECHO MEAS - TAPSE (>1.6): 3.7 CM
BH CV ECHO MEAS - TR MAX VEL: 236.7 CM/SEC
BH CV XLRA - RV BASE: 3 CM
BH CV XLRA - RV MID: 3 CM
LV EF 2D ECHO EST: 60 %
MAXIMAL PREDICTED HEART RATE: 166 BPM
STRESS TARGET HR: 141 BPM

## 2020-12-10 ENCOUNTER — TELEPHONE (OUTPATIENT)
Dept: CARDIOLOGY | Facility: CLINIC | Age: 55
End: 2020-12-10

## 2020-12-10 NOTE — TELEPHONE ENCOUNTER
----- Message from Brittanie Lopez MD sent at 12/8/2020  2:46 PM EST -----  Ascending aortic aneurysm and bicuspid aortic valve  Follow-up in office in 3 months to further evaluate the ascending aortic aneurysm and consider getting a CT of the chest to reevaluate the ascending aorta

## 2020-12-10 NOTE — TELEPHONE ENCOUNTER
Called and informed the Pt per Dr. Mcdaniel that his echo showed ascending aortic aneurysm and bicuspid aortic valve. I informed the Pt that Dr. Mcdaniel follow-up in office in 3 months to further evaluate the ascending aortic aneurysm and consider getting a CT of the chest to reevaluate the ascending aorta. The Pt stated understanding and is agreeable to this. Appt made for 3/17/20 in [place] to see Dr. Mcdaniel.

## 2021-01-07 DIAGNOSIS — E78.2 MIXED HYPERLIPIDEMIA: ICD-10-CM

## 2021-01-07 RX ORDER — ATORVASTATIN CALCIUM 20 MG/1
TABLET, FILM COATED ORAL
Qty: 90 TABLET | Refills: 0 | Status: SHIPPED | OUTPATIENT
Start: 2021-01-07 | End: 2021-03-17

## 2021-02-11 RX ORDER — LISINOPRIL 5 MG/1
TABLET ORAL
Qty: 90 TABLET | Refills: 3 | Status: SHIPPED | OUTPATIENT
Start: 2021-02-11 | End: 2022-02-15

## 2021-03-17 ENCOUNTER — OFFICE VISIT (OUTPATIENT)
Dept: CARDIOLOGY | Facility: CLINIC | Age: 56
End: 2021-03-17

## 2021-03-17 VITALS
SYSTOLIC BLOOD PRESSURE: 157 MMHG | OXYGEN SATURATION: 99 % | BODY MASS INDEX: 25.9 KG/M2 | RESPIRATION RATE: 18 BRPM | DIASTOLIC BLOOD PRESSURE: 78 MMHG | HEART RATE: 96 BPM | HEIGHT: 71 IN | WEIGHT: 185 LBS

## 2021-03-17 DIAGNOSIS — I35.1 NONRHEUMATIC AORTIC VALVE INSUFFICIENCY: ICD-10-CM

## 2021-03-17 DIAGNOSIS — E78.2 MIXED HYPERLIPIDEMIA: ICD-10-CM

## 2021-03-17 DIAGNOSIS — I10 ESSENTIAL HYPERTENSION: Primary | ICD-10-CM

## 2021-03-17 PROCEDURE — 99214 OFFICE O/P EST MOD 30 MIN: CPT | Performed by: INTERNAL MEDICINE

## 2021-03-17 PROCEDURE — 93000 ELECTROCARDIOGRAM COMPLETE: CPT | Performed by: INTERNAL MEDICINE

## 2021-03-17 NOTE — PROGRESS NOTES
Cardiology Office Visit      Encounter Date:  03/17/2021    Patient ID:   Sami Quiroga is a 55 y.o. male.    Reason For Followup:  Aortic regurgitation and ascending aortic aneurysm    Brief Clinical History:  Dear Dr. Mcintosh, Renard Pope MD    I had the pleasure of seeing Sami Quiroga today. As you are well aware, this is a 55 y.o. male  with history of aortic valve disorder with aortic insufficiency presents to my office for followup.  Patient is currently stable without symptoms of chest pain or shortness of breath at rest or exertion.  No palpitations But has occasional dizziness  without any syncope.No complaints of any PND or orthopnea.  No swelling of the feet.  Patient has been taking all is medicines.    Interpretation Summary    · Estimated left ventricular EF = 60% Estimated left ventricular EF was in agreement with the calculated left ventricular EF. Left ventricular systolic function is normal.  · Moderate dilation of the aortic root is present. Severe dilation of the proximal aorta is present. Severe dilation of the ascending aorta is present.  · Moderate aortic valve regurgitation is present.  · Estimated right ventricular systolic pressure from tricuspid regurgitation is mildly elevated (35-45 mmHg).  · Mild pulmonary hypertension is present.  · Consider CT chest to further evaluate the ascending aortic aneurysm         Interval History:  Denies any symptoms of chest pain shortness of breath dizziness or syncope  Denies any new cardiac symptoms  Significant change in the functional status  Assessment & Plan    Impressions:  Aortic regurgitation, moderate  Likely bicuspid aortic valve  bicuspid aortic valve is suggested with fused non-coronary cusps  Ascending aortic aneurysm  Hypertension  Prior two echocardiograms were reviewed with the patient/aneurysm size on most recent echo 4.4 cm  Moderate aortic insufficiency  CT angiogram of the aorta shows aortic root size of 4.3 cm and ascending  "aorta of 4.7 cm    Recommendations:  Goal systolic blood pressure less than 130  goal LDL less than 70  Continue home blood pressure monitoring  Schedule for CT angiogram of the chest to further assess the ascending aortic aneurysm  Recent labs reviewed and discussed with the patient  Likely consider surgical evaluation of the aneurysm size is more than 5 cm  follow-up in office in 6 months      Objective:    Vitals:  Vitals:    03/17/21 1453   BP: 157/78   BP Location: Left arm   Pulse: 96   Resp: 18   SpO2: 99%   Weight: 83.9 kg (185 lb)   Height: 180.3 cm (71\")       Physical Exam:    General: Alert, cooperative, no distress, appears stated age  Head:  Normocephalic, atraumatic, mucous membranes moist  Eyes:  Conjunctiva/corneas clear, EOM's intact     Neck:  Supple,  no adenopathy;      Lungs: Clear to auscultation bilaterally, no wheezes rhonchi rales are noted  Chest wall: No tenderness  Heart::  Regular rate and rhythm, S1 and S2 normal, no murmur, rub or gallop  Abdomen: Soft, non-tender, nondistended bowel sounds active  Extremities: No cyanosis, clubbing, or edema  Pulses: 2+ and symmetric all extremities  Skin:  No rashes or lesions  Neuro/psych: A&O x3. CN II through XII are grossly intact with appropriate affect      Allergies:  No Known Allergies    Medication Review:     Current Outpatient Medications:   •  atorvastatin (LIPITOR) 20 MG tablet, TAKE 1 TABLET BY MOUTH AT BEDTIME, Disp: 90 tablet, Rfl: 3  •  lisinopril (PRINIVIL,ZESTRIL) 5 MG tablet, TAKE 1 TABLET BY MOUTH EVERY DAY, Disp: 90 tablet, Rfl: 3  •  metoprolol succinate XL (TOPROL-XL) 25 MG 24 hr tablet, TAKE 1 TABLET BY MOUTH EVERY DAY, Disp: 90 tablet, Rfl: 2    Family History:  Family History   Problem Relation Age of Onset   • Hypertension Mother    • Hypertension Father        Past Medical History:  Past Medical History:   Diagnosis Date   • Heart murmur    • Hyperlipidemia    • Hypertension        Past surgical History:  Past Surgical " History:   Procedure Laterality Date   • APPENDECTOMY         Social History:  Social History     Socioeconomic History   • Marital status:      Spouse name: Not on file   • Number of children: Not on file   • Years of education: Not on file   • Highest education level: Not on file   Tobacco Use   • Smoking status: Former Smoker     Types: Cigarettes   • Smokeless tobacco: Never Used   Vaping Use   • Vaping Use: Never used   Substance and Sexual Activity   • Alcohol use: Yes     Comment: Rare    • Drug use: No   • Sexual activity: Defer       Review of Systems:  The following systems were reviewed as they relate to the cardiovascular system: Constitutional, Eyes, ENT, Cardiovascular, Respiratory, Gastrointestinal, Integumentary, Neurological, Psychiatric, Hematologic, Endocrine, Musculoskeletal, and Genitourinary. The pertinent cardiovascular findings are reported above with all other cardiovascular points within those systems being negative.    Diagnostic Study Review:     Current Electrocardiogram:    ECG 12 Lead    Date/Time: 3/17/2021 3:36 PM  Performed by: Brittanie Lopez MD  Authorized by: Brittanie Lopez MD   Previous ECG: no previous ECG available  Rhythm: sinus rhythm  Rate: normal  BPM: 96  Conduction: conduction normal  QRS axis: normal    Clinical impression: normal ECG              NOTE: The following portions of the patient's history were reviewed and updated this visit as appropriate: allergies, current medications, past family history, past medical history, past social history, past surgical history and problem list.

## 2021-03-31 DIAGNOSIS — E78.2 MIXED HYPERLIPIDEMIA: ICD-10-CM

## 2021-04-02 ENCOUNTER — TELEPHONE (OUTPATIENT)
Dept: FAMILY MEDICINE CLINIC | Facility: CLINIC | Age: 56
End: 2021-04-02

## 2021-04-02 RX ORDER — ATORVASTATIN CALCIUM 20 MG/1
20 TABLET, FILM COATED ORAL
Qty: 90 TABLET | Refills: 0 | Status: CANCELLED | OUTPATIENT
Start: 2021-04-02

## 2021-04-02 NOTE — TELEPHONE ENCOUNTER
Caller: Sami Quiroga    Relationship: Self    Best call back number: [phone]    Medication needed:   Requested Prescriptions     Pending Prescriptions Disp Refills   • atorvastatin (LIPITOR) 20 MG tablet 90 tablet 3     Sig: Take 1 tablet by mouth every night at bedtime.       When do you need the refill by: 04/05    What additional details did the patient provide when requesting the medication: PHARMACY REQUESTED PRIOR APPROVAL.    Does the patient have less than a 3 day supply:  [] Yes  [x] No    What is the patient's preferred pharmacy: [id number] - [place], [place] - [address] - [phone]  - [phone] FX

## 2021-04-04 RX ORDER — ATORVASTATIN CALCIUM 20 MG/1
TABLET, FILM COATED ORAL
Qty: 30 TABLET | Refills: 2 | Status: SHIPPED | OUTPATIENT
Start: 2021-04-04 | End: 2021-07-22

## 2021-04-05 ENCOUNTER — HOSPITAL ENCOUNTER (OUTPATIENT)
Dept: CT IMAGING | Facility: HOSPITAL | Age: 56
Discharge: HOME | End: 2021-04-05
Admitting: INTERNAL MEDICINE

## 2021-04-05 LAB — CREAT BLDA-MCNC: 1 MG/DL (ref 0.6–1.3)

## 2021-04-05 PROCEDURE — 0 IOPAMIDOL PER 1 ML: Performed by: INTERNAL MEDICINE

## 2021-04-05 PROCEDURE — 82565 ASSAY OF CREATININE: CPT

## 2021-04-05 PROCEDURE — 71275 CT ANGIOGRAPHY CHEST: CPT

## 2021-04-05 RX ADMIN — IOPAMIDOL 100 ML: 755 INJECTION, SOLUTION INTRAVENOUS at 08:16

## 2021-04-15 ENCOUNTER — TELEPHONE (OUTPATIENT)
Dept: CARDIOLOGY | Facility: CLINIC | Age: 56
End: 2021-04-15

## 2021-04-15 NOTE — TELEPHONE ENCOUNTER
Pt returned my call and I informed him per Dr. Mcdaniel that the CT angiogram showed that the ascending aortic aneurysm is stable and measuring 4.7cm in diameter and that there is no significant change from his last CT scan. The Pt stated understanding. Pt to follow up on 9/15/21 as scheduled. Pt is agreeable.

## 2021-07-21 DIAGNOSIS — E78.2 MIXED HYPERLIPIDEMIA: ICD-10-CM

## 2021-07-22 RX ORDER — ATORVASTATIN CALCIUM 20 MG/1
TABLET, FILM COATED ORAL
Qty: 30 TABLET | Refills: 2 | Status: SHIPPED | OUTPATIENT
Start: 2021-07-22 | End: 2021-10-22

## 2021-08-20 RX ORDER — METOPROLOL SUCCINATE 25 MG/1
TABLET, EXTENDED RELEASE ORAL
Qty: 90 TABLET | Refills: 2 | Status: SHIPPED | OUTPATIENT
Start: 2021-08-20 | End: 2022-03-16 | Stop reason: SDUPTHER

## 2021-09-15 ENCOUNTER — OFFICE VISIT (OUTPATIENT)
Dept: CARDIOLOGY | Facility: CLINIC | Age: 56
End: 2021-09-15

## 2021-09-15 VITALS — DIASTOLIC BLOOD PRESSURE: 78 MMHG | SYSTOLIC BLOOD PRESSURE: 126 MMHG | HEART RATE: 81 BPM | OXYGEN SATURATION: 98 %

## 2021-09-15 DIAGNOSIS — R01.1 HEART MURMUR: ICD-10-CM

## 2021-09-15 DIAGNOSIS — I10 ESSENTIAL HYPERTENSION: ICD-10-CM

## 2021-09-15 DIAGNOSIS — E78.2 MIXED HYPERLIPIDEMIA: ICD-10-CM

## 2021-09-15 DIAGNOSIS — I71.20 THORACIC AORTIC ANEURYSM WITHOUT RUPTURE (HCC): ICD-10-CM

## 2021-09-15 DIAGNOSIS — I35.1 NONRHEUMATIC AORTIC VALVE INSUFFICIENCY: Primary | ICD-10-CM

## 2021-09-15 PROCEDURE — 93000 ELECTROCARDIOGRAM COMPLETE: CPT | Performed by: INTERNAL MEDICINE

## 2021-09-15 PROCEDURE — 99214 OFFICE O/P EST MOD 30 MIN: CPT | Performed by: INTERNAL MEDICINE

## 2021-09-15 NOTE — PROGRESS NOTES
Cardiology Office Visit      Encounter Date:  09/15/2021    Patient ID:   Sami Quiroga is a 56 y.o. male.      Reason For Followup:  Aortic regurgitation and ascending aortic aneurysm    Brief Clinical History:  Dear Dr. Mcintosh, Renard Pope MD    I had the pleasure of seeing Sami Quiroga today. As you are well aware, this is a 56 y.o. male  with history of aortic valve disorder with aortic insufficiency presents to my office for followup.  Patient is currently stable without symptoms of chest pain or shortness of breath at rest or exertion.  No palpitations But has occasional dizziness  without any syncope.No complaints of any PND or orthopnea.  No swelling of the feet.  Patient has been taking all is medicines.    Interpretation Summary    · Estimated left ventricular EF = 60% Estimated left ventricular EF was in agreement with the calculated left ventricular EF. Left ventricular systolic function is normal.  · Moderate dilation of the aortic root is present. Severe dilation of the proximal aorta is present. Severe dilation of the ascending aorta is present.  · Moderate aortic valve regurgitation is present.  · Estimated right ventricular systolic pressure from tricuspid regurgitation is mildly elevated (35-45 mmHg).  · Mild pulmonary hypertension is present.  · Consider CT chest to further evaluate the ascending aortic aneurysm         Interval History:  Denies any symptoms of chest pain shortness of breath dizziness or syncope  Denies any new cardiac symptoms  Significant change in the functional status  Assessment & Plan    Impressions:  Aortic regurgitation, moderate  Likely bicuspid aortic valve  bicuspid aortic valve is suggested with fused non-coronary cusps  Ascending aortic aneurysm  Hypertension  Prior two echocardiograms were reviewed with the patient/aneurysm size on most recent echo 4.4 cm  Moderate aortic insufficiency  CT angiogram of the aorta shows aortic root size of 4.3 cm and ascending  aorta of 4.7 cm/unchanged in size from 20 18-20 21  Possible functional bicuspid aortic valve    Recommendations:  Goal systolic blood pressure less than 130  goal LDL less than 70  Continue home blood pressure monitoring  Findings of the CT chest and prior echocardiogram reviewed and discussed with the patient  Jason echocardiogram to assess the severity of the aortic regurgitation plain treadmill stress to assess the functional status of the patient  Recent labs reviewed and discussed with the patient  Likely consider surgical evaluation of the aneurysm size is more than 5 cm  follow-up in office in 6 months  Objective:    Vitals:  Vitals:    09/15/21 1254   BP: 126/78   Pulse: 81   SpO2: 98%       Physical Exam:    General: Alert, cooperative, no distress, appears stated age  Head:  Normocephalic, atraumatic, mucous membranes moist  Eyes:  Conjunctiva/corneas clear, EOM's intact     Neck:  Supple,  no adenopathy;      Lungs: Clear to auscultation bilaterally, no wheezes rhonchi rales are noted  Chest wall: No tenderness  Heart::  Regular rate and rhythm, S1 and S2 normal, 2 x 6 diastolic murmur  Abdomen: Soft, non-tender, nondistended bowel sounds active  Extremities: No cyanosis, clubbing, or edema  Pulses: 2+ and symmetric all extremities  Skin:  No rashes or lesions  Neuro/psych: A&O x3. CN II through XII are grossly intact with appropriate affect      Allergies:  No Known Allergies    Medication Review:     Current Outpatient Medications:   •  atorvastatin (LIPITOR) 20 MG tablet, TAKE 1 TABLET BY MOUTH EVERYDAY AT BEDTIME, Disp: 30 tablet, Rfl: 2  •  lisinopril (PRINIVIL,ZESTRIL) 5 MG tablet, TAKE 1 TABLET BY MOUTH EVERY DAY, Disp: 90 tablet, Rfl: 3  •  metoprolol succinate XL (TOPROL-XL) 25 MG 24 hr tablet, TAKE 1 TABLET BY MOUTH EVERY DAY, Disp: 90 tablet, Rfl: 2    Family History:  Family History   Problem Relation Age of Onset   • Hypertension Mother    • Hypertension Father        Past Medical  History:  Past Medical History:   Diagnosis Date   • Heart murmur    • Hyperlipidemia    • Hypertension        Past surgical History:  Past Surgical History:   Procedure Laterality Date   • APPENDECTOMY         Social History:  Social History     Socioeconomic History   • Marital status:      Spouse name: Not on file   • Number of children: Not on file   • Years of education: Not on file   • Highest education level: Not on file   Tobacco Use   • Smoking status: Former Smoker     Types: Cigarettes   • Smokeless tobacco: Never Used   Vaping Use   • Vaping Use: Never used   Substance and Sexual Activity   • Alcohol use: Yes     Comment: Rare    • Drug use: No   • Sexual activity: Defer       Review of Systems:  The following systems were reviewed as they relate to the cardiovascular system: Constitutional, Eyes, ENT, Cardiovascular, Respiratory, Gastrointestinal, Integumentary, Neurological, Psychiatric, Hematologic, Endocrine, Musculoskeletal, and Genitourinary. The pertinent cardiovascular findings are reported above with all other cardiovascular points within those systems being negative.    Diagnostic Study Review:     Current Electrocardiogram:    ECG 12 Lead    Date/Time: 9/15/2021 1:35 PM  Performed by: Brittanie Lopez MD  Authorized by: Brittanie Lopez MD   Comparison: compared with previous ECG   Similar to previous ECG  Rhythm: sinus rhythm  Rate: normal  BPM: 81  Conduction: conduction normal  ST Segments: ST segments normal  T Waves: T waves normal  QRS axis: normal  Other: no other findings    Clinical impression: normal ECG              NOTE: The following portions of the patient's history were reviewed and updated this visit as appropriate: allergies, current medications, past family history, past medical history, past social history, past surgical history and problem list.

## 2021-10-21 DIAGNOSIS — E78.2 MIXED HYPERLIPIDEMIA: ICD-10-CM

## 2021-10-22 RX ORDER — ATORVASTATIN CALCIUM 20 MG/1
TABLET, FILM COATED ORAL
Qty: 30 TABLET | Refills: 2 | Status: SHIPPED | OUTPATIENT
Start: 2021-10-22 | End: 2022-03-03

## 2021-12-13 ENCOUNTER — HOSPITAL ENCOUNTER (OUTPATIENT)
Dept: RESPIRATORY THERAPY | Facility: HOSPITAL | Age: 56
Discharge: HOME | End: 2021-12-13

## 2021-12-13 ENCOUNTER — HOSPITAL ENCOUNTER (OUTPATIENT)
Dept: CARDIOLOGY | Facility: HOSPITAL | Age: 56
Discharge: HOME | End: 2021-12-13

## 2021-12-13 VITALS
WEIGHT: 185 LBS | BODY MASS INDEX: 25.9 KG/M2 | SYSTOLIC BLOOD PRESSURE: 127 MMHG | DIASTOLIC BLOOD PRESSURE: 81 MMHG | HEIGHT: 71 IN

## 2021-12-13 DIAGNOSIS — I71.20 THORACIC AORTIC ANEURYSM WITHOUT RUPTURE (HCC): ICD-10-CM

## 2021-12-13 DIAGNOSIS — I35.1 NONRHEUMATIC AORTIC VALVE INSUFFICIENCY: ICD-10-CM

## 2021-12-13 LAB
BH CV ECHO MEAS - % IVS THICK: 22.6 %
BH CV ECHO MEAS - % LVPW THICK: 35.8 %
BH CV ECHO MEAS - ACS: 1.6 CM
BH CV ECHO MEAS - AO MAX PG (FULL): 5.7 MMHG
BH CV ECHO MEAS - AO MAX PG: 11.1 MMHG
BH CV ECHO MEAS - AO MEAN PG (FULL): 3.1 MMHG
BH CV ECHO MEAS - AO MEAN PG: 6 MMHG
BH CV ECHO MEAS - AO ROOT AREA (BSA CORRECTED): 1.9
BH CV ECHO MEAS - AO ROOT AREA: 11.7 CM^2
BH CV ECHO MEAS - AO ROOT DIAM: 3.9 CM
BH CV ECHO MEAS - AO V2 MAX: 166.4 CM/SEC
BH CV ECHO MEAS - AO V2 MEAN: 115.3 CM/SEC
BH CV ECHO MEAS - AO V2 VTI: 39.5 CM
BH CV ECHO MEAS - ASC AORTA: 4.3 CM
BH CV ECHO MEAS - AVA(I,A): 2.3 CM^2
BH CV ECHO MEAS - AVA(I,D): 2.3 CM^2
BH CV ECHO MEAS - AVA(V,A): 2.4 CM^2
BH CV ECHO MEAS - AVA(V,D): 2.4 CM^2
BH CV ECHO MEAS - BSA(HAYCOCK): 2.1 M^2
BH CV ECHO MEAS - BSA: 2 M^2
BH CV ECHO MEAS - BZI_BMI: 25.8 KILOGRAMS/M^2
BH CV ECHO MEAS - BZI_METRIC_HEIGHT: 180.3 CM
BH CV ECHO MEAS - BZI_METRIC_WEIGHT: 83.9 KG
BH CV ECHO MEAS - EDV(CUBED): 122 ML
BH CV ECHO MEAS - EDV(MOD-SP4): 99.1 ML
BH CV ECHO MEAS - EDV(TEICH): 116 ML
BH CV ECHO MEAS - EF(CUBED): 65.4 %
BH CV ECHO MEAS - EF(MOD-BP): 65 %
BH CV ECHO MEAS - EF(MOD-SP4): 65 %
BH CV ECHO MEAS - EF(TEICH): 56.7 %
BH CV ECHO MEAS - ESV(CUBED): 42.2 ML
BH CV ECHO MEAS - ESV(MOD-SP4): 34.7 ML
BH CV ECHO MEAS - ESV(TEICH): 50.2 ML
BH CV ECHO MEAS - FS: 29.8 %
BH CV ECHO MEAS - IVS/LVPW: 1
BH CV ECHO MEAS - IVSD: 1 CM
BH CV ECHO MEAS - IVSS: 1.3 CM
BH CV ECHO MEAS - LA DIMENSION(2D): 3.4 CM
BH CV ECHO MEAS - LV DIASTOLIC VOL/BSA (35-75): 48.6 ML/M^2
BH CV ECHO MEAS - LV MASS(C)D: 190.4 GRAMS
BH CV ECHO MEAS - LV MASS(C)DI: 93.3 GRAMS/M^2
BH CV ECHO MEAS - LV MASS(C)S: 161.7 GRAMS
BH CV ECHO MEAS - LV MASS(C)SI: 79.3 GRAMS/M^2
BH CV ECHO MEAS - LV MAX PG: 5.3 MMHG
BH CV ECHO MEAS - LV MEAN PG: 2.9 MMHG
BH CV ECHO MEAS - LV SYSTOLIC VOL/BSA (12-30): 17 ML/M^2
BH CV ECHO MEAS - LV V1 MAX: 115.4 CM/SEC
BH CV ECHO MEAS - LV V1 MEAN: 79.9 CM/SEC
BH CV ECHO MEAS - LV V1 VTI: 27.1 CM
BH CV ECHO MEAS - LVIDD: 5 CM
BH CV ECHO MEAS - LVIDS: 3.5 CM
BH CV ECHO MEAS - LVOT AREA: 3.4 CM^2
BH CV ECHO MEAS - LVOT DIAM: 2.1 CM
BH CV ECHO MEAS - LVPWD: 1 CM
BH CV ECHO MEAS - LVPWS: 1.4 CM
BH CV ECHO MEAS - MV A MAX VEL: 77.4 CM/SEC
BH CV ECHO MEAS - MV DEC SLOPE: 377.4 CM/SEC^2
BH CV ECHO MEAS - MV DEC TIME: 0.23 SEC
BH CV ECHO MEAS - MV E MAX VEL: 85.2 CM/SEC
BH CV ECHO MEAS - MV E/A: 1.1
BH CV ECHO MEAS - MV MAX PG: 3.1 MMHG
BH CV ECHO MEAS - MV MEAN PG: 1.6 MMHG
BH CV ECHO MEAS - MV V2 MAX: 88.2 CM/SEC
BH CV ECHO MEAS - MV V2 MEAN: 60.5 CM/SEC
BH CV ECHO MEAS - MV V2 VTI: 25.1 CM
BH CV ECHO MEAS - MVA(VTI): 3.7 CM^2
BH CV ECHO MEAS - PA ACC TIME: 0.16 SEC
BH CV ECHO MEAS - PA MAX PG (FULL): 1.9 MMHG
BH CV ECHO MEAS - PA MAX PG: 3.4 MMHG
BH CV ECHO MEAS - PA MEAN PG (FULL): 1.3 MMHG
BH CV ECHO MEAS - PA MEAN PG: 2 MMHG
BH CV ECHO MEAS - PA PR(ACCEL): 7.4 MMHG
BH CV ECHO MEAS - PA V2 MAX: 91.7 CM/SEC
BH CV ECHO MEAS - PA V2 MEAN: 66.5 CM/SEC
BH CV ECHO MEAS - PA V2 VTI: 20.3 CM
BH CV ECHO MEAS - PULM A REVS DUR: 0.08 SEC
BH CV ECHO MEAS - PULM A REVS VEL: 30.9 CM/SEC
BH CV ECHO MEAS - PULM DIAS VEL: 52.2 CM/SEC
BH CV ECHO MEAS - PULM S/D: 0.94
BH CV ECHO MEAS - PULM SYS VEL: 49.2 CM/SEC
BH CV ECHO MEAS - RV MAX PG: 1.5 MMHG
BH CV ECHO MEAS - RV MEAN PG: 0.72 MMHG
BH CV ECHO MEAS - RV V1 MAX: 60.3 CM/SEC
BH CV ECHO MEAS - RV V1 MEAN: 38.8 CM/SEC
BH CV ECHO MEAS - RV V1 VTI: 13.3 CM
BH CV ECHO MEAS - RVDD: 3.1 CM
BH CV ECHO MEAS - SI(AO): 225.7 ML/M^2
BH CV ECHO MEAS - SI(CUBED): 39.1 ML/M^2
BH CV ECHO MEAS - SI(LVOT): 45.3 ML/M^2
BH CV ECHO MEAS - SI(MOD-SP4): 31.6 ML/M^2
BH CV ECHO MEAS - SI(TEICH): 32.3 ML/M^2
BH CV ECHO MEAS - SV(AO): 460.5 ML
BH CV ECHO MEAS - SV(CUBED): 79.8 ML
BH CV ECHO MEAS - SV(LVOT): 92.4 ML
BH CV ECHO MEAS - SV(MOD-SP4): 64.4 ML
BH CV ECHO MEAS - SV(TEICH): 65.8 ML
BH CV STRESS BP STAGE 1: NORMAL
BH CV STRESS BP STAGE 2: NORMAL
BH CV STRESS BP STAGE 3: NORMAL
BH CV STRESS DURATION MIN STAGE 1: 3
BH CV STRESS DURATION MIN STAGE 2: 3
BH CV STRESS DURATION MIN STAGE 3: 3
BH CV STRESS DURATION SEC STAGE 1: 0
BH CV STRESS DURATION SEC STAGE 2: 0
BH CV STRESS DURATION SEC STAGE 3: 0
BH CV STRESS GRADE STAGE 1: 10
BH CV STRESS GRADE STAGE 2: 12
BH CV STRESS GRADE STAGE 3: 14
BH CV STRESS HR STAGE 1: 103
BH CV STRESS HR STAGE 2: 138
BH CV STRESS HR STAGE 3: 141
BH CV STRESS METS STAGE 1: 4.6
BH CV STRESS METS STAGE 2: 7
BH CV STRESS METS STAGE 3: 10.1
BH CV STRESS PROTOCOL 1: NORMAL
BH CV STRESS RECOVERY BP: NORMAL MMHG
BH CV STRESS RECOVERY HR: 102 BPM
BH CV STRESS SPEED STAGE 1: 1.7
BH CV STRESS SPEED STAGE 2: 2.5
BH CV STRESS SPEED STAGE 3: 3.4
BH CV STRESS STAGE 1: 1
BH CV STRESS STAGE 2: 2
BH CV STRESS STAGE 3: 3
LV EF 2D ECHO EST: 65 %
MAXIMAL PREDICTED HEART RATE: 165 BPM
MAXIMAL PREDICTED HEART RATE: 165 BPM
PERCENT MAX PREDICTED HR: 85.45 %
STRESS BASELINE BP: NORMAL MMHG
STRESS BASELINE HR: 70 BPM
STRESS PERCENT HR: 101 %
STRESS POST ESTIMATED WORKLOAD: 10.1 METS
STRESS POST EXERCISE DUR MIN: 7 MIN
STRESS POST PEAK BP: NORMAL MMHG
STRESS POST PEAK HR: 141 BPM
STRESS TARGET HR: 140 BPM
STRESS TARGET HR: 140 BPM

## 2021-12-13 PROCEDURE — 93017 CV STRESS TEST TRACING ONLY: CPT

## 2021-12-13 PROCEDURE — 93306 TTE W/DOPPLER COMPLETE: CPT

## 2021-12-13 PROCEDURE — 93306 TTE W/DOPPLER COMPLETE: CPT | Performed by: INTERNAL MEDICINE

## 2021-12-13 PROCEDURE — 93016 CV STRESS TEST SUPVJ ONLY: CPT | Performed by: INTERNAL MEDICINE

## 2021-12-13 PROCEDURE — 93018 CV STRESS TEST I&R ONLY: CPT | Performed by: INTERNAL MEDICINE

## 2021-12-17 ENCOUNTER — TELEPHONE (OUTPATIENT)
Dept: CARDIOLOGY | Facility: CLINIC | Age: 56
End: 2021-12-17

## 2021-12-17 NOTE — TELEPHONE ENCOUNTER
Called and notified, patient verbalized understanding    ------------------------    Brittanie Lopez MD Trent, Melissa, MA    Size of the ascending aortic aneurysm and also aortic regurgitation unchanged from before

## 2022-01-14 DIAGNOSIS — E78.2 MIXED HYPERLIPIDEMIA: ICD-10-CM

## 2022-01-14 RX ORDER — ATORVASTATIN CALCIUM 20 MG/1
TABLET, FILM COATED ORAL
Qty: 30 TABLET | Refills: 2 | OUTPATIENT
Start: 2022-01-14

## 2022-02-15 RX ORDER — LISINOPRIL 5 MG/1
TABLET ORAL
Qty: 30 TABLET | Refills: 11 | Status: SHIPPED | OUTPATIENT
Start: 2022-02-15 | End: 2022-03-16 | Stop reason: SDUPTHER

## 2022-02-24 DIAGNOSIS — E78.2 MIXED HYPERLIPIDEMIA: ICD-10-CM

## 2022-02-28 DIAGNOSIS — E78.2 MIXED HYPERLIPIDEMIA: ICD-10-CM

## 2022-02-28 RX ORDER — ATORVASTATIN CALCIUM 20 MG/1
20 TABLET, FILM COATED ORAL
Qty: 30 TABLET | Refills: 2 | OUTPATIENT
Start: 2022-02-28

## 2022-02-28 RX ORDER — ATORVASTATIN CALCIUM 20 MG/1
TABLET, FILM COATED ORAL
Qty: 30 TABLET | Refills: 2 | OUTPATIENT
Start: 2022-02-28

## 2022-03-02 DIAGNOSIS — E78.2 MIXED HYPERLIPIDEMIA: ICD-10-CM

## 2022-03-03 RX ORDER — ATORVASTATIN CALCIUM 20 MG/1
TABLET, FILM COATED ORAL
Qty: 30 TABLET | Refills: 0 | Status: SHIPPED | OUTPATIENT
Start: 2022-03-03 | End: 2022-03-16 | Stop reason: SDUPTHER

## 2022-03-16 ENCOUNTER — OFFICE VISIT (OUTPATIENT)
Dept: CARDIOLOGY | Facility: CLINIC | Age: 57
End: 2022-03-16

## 2022-03-16 VITALS
OXYGEN SATURATION: 98 % | HEIGHT: 71 IN | BODY MASS INDEX: 26.74 KG/M2 | SYSTOLIC BLOOD PRESSURE: 150 MMHG | WEIGHT: 191 LBS | HEART RATE: 82 BPM | DIASTOLIC BLOOD PRESSURE: 80 MMHG | RESPIRATION RATE: 18 BRPM

## 2022-03-16 DIAGNOSIS — I71.20 THORACIC AORTIC ANEURYSM WITHOUT RUPTURE: ICD-10-CM

## 2022-03-16 DIAGNOSIS — I35.9 AORTIC VALVE DISORDER: ICD-10-CM

## 2022-03-16 DIAGNOSIS — I10 PRIMARY HYPERTENSION: ICD-10-CM

## 2022-03-16 DIAGNOSIS — E78.2 MIXED HYPERLIPIDEMIA: ICD-10-CM

## 2022-03-16 DIAGNOSIS — I77.810 AORTIC ROOT DILATION: ICD-10-CM

## 2022-03-16 DIAGNOSIS — I35.1 NONRHEUMATIC AORTIC VALVE INSUFFICIENCY: Primary | ICD-10-CM

## 2022-03-16 PROCEDURE — 93000 ELECTROCARDIOGRAM COMPLETE: CPT | Performed by: INTERNAL MEDICINE

## 2022-03-16 PROCEDURE — 99214 OFFICE O/P EST MOD 30 MIN: CPT | Performed by: INTERNAL MEDICINE

## 2022-03-16 RX ORDER — METOPROLOL SUCCINATE 25 MG/1
25 TABLET, EXTENDED RELEASE ORAL DAILY
Qty: 90 TABLET | Refills: 3 | Status: SHIPPED | OUTPATIENT
Start: 2022-03-16

## 2022-03-16 RX ORDER — LISINOPRIL 5 MG/1
5 TABLET ORAL DAILY
Qty: 90 TABLET | Refills: 3 | Status: SHIPPED | OUTPATIENT
Start: 2022-03-16

## 2022-03-16 RX ORDER — ATORVASTATIN CALCIUM 20 MG/1
20 TABLET, FILM COATED ORAL
Qty: 90 TABLET | Refills: 3 | Status: SHIPPED | OUTPATIENT
Start: 2022-03-16 | End: 2023-03-13

## 2022-03-16 NOTE — PROGRESS NOTES
Cardiology Office Visit      Encounter Date:  03/16/2022    Patient ID:   Sami Quiroga is a 56 y.o. male.    Reason For Followup:  Aortic regurgitation and ascending aortic aneurysm    Brief Clinical History:  Dear Dr. Mcintosh, Renard Pope MD    I had the pleasure of seeing Sami Quiroga today. As you are well aware, this is a 56 y.o. male  with history of aortic valve disorder with aortic insufficiency presents to my office for followup.  Patient is currently stable without symptoms of chest pain or shortness of breath at rest or exertion.  No palpitations But has occasional dizziness  without any syncope.No complaints of any PND or orthopnea.  No swelling of the feet.  Patient has been taking all is medicines.    Interpretation Summary    · Estimated left ventricular EF = 60% Estimated left ventricular EF was in agreement with the calculated left ventricular EF. Left ventricular systolic function is normal.  · Moderate dilation of the aortic root is present. Severe dilation of the proximal aorta is present. Severe dilation of the ascending aorta is present.  · Moderate aortic valve regurgitation is present.  · Estimated right ventricular systolic pressure from tricuspid regurgitation is mildly elevated (35-45 mmHg).  · Mild pulmonary hypertension is present.  · Consider CT chest to further evaluate the ascending aortic aneurysm         Interval History:  Denies any symptoms of chest pain shortness of breath dizziness or syncope  Denies any new cardiac symptoms  No significant change in the functional status  Assessment & Plan    Impressions:  Aortic regurgitation, moderate  Bicuspid aortic valve  bicuspid aortic valve is suggested with fused non-coronary cusps  Ascending aortic aneurysm  Hypertension  Prior two echocardiograms were reviewed with the patient/aneurysm size on most recent echo 4.3 cm  Moderate aortic insufficiency  CT angiogram of the aorta shows aortic root size of 4.3 cm and ascending aorta  "of 4.7 cm/unchanged in size from 20 18-20 21      Recommendations:  Goal systolic blood pressure less than 130  goal LDL less than 70  Continue home blood pressure monitoring  Findings of the CT chest and prior echocardiogram reviewed and discussed with the patient  Repeat echocardiogram with only mild to moderate aortic regurgitation and no significant change in the size of the ascending aortic aneurysm  Recent labs reviewed and discussed with the patient  Likely consider surgical evaluation of the aneurysm size is more than 5 cm  follow-up in office in 6 months    Objective:    Vitals:  Vitals:    03/16/22 1304   BP: 150/80   BP Location: Left arm   Patient Position: Sitting   Cuff Size: Large Adult   Pulse: 82   Resp: 18   SpO2: 98%   Weight: 86.6 kg (191 lb)   Height: 180.3 cm (71\")       Physical Exam:    General: Alert, cooperative, no distress, appears stated age  Head:  Normocephalic, atraumatic, mucous membranes moist  Eyes:  Conjunctiva/corneas clear, EOM's intact     Neck:  Supple,  no adenopathy;      Lungs: Clear to auscultation bilaterally, no wheezes rhonchi rales are noted  Chest wall: No tenderness  Heart::  Regular rate and rhythm, S1 and S2 normal, no murmur, rub or gallop  Abdomen: Soft, non-tender, nondistended bowel sounds active  Extremities: No cyanosis, clubbing, or edema  Pulses: 2+ and symmetric all extremities  Skin:  No rashes or lesions  Neuro/psych: A&O x3. CN II through XII are grossly intact with appropriate affect      Allergies:  No Known Allergies    Medication Review:     Current Outpatient Medications:   •  atorvastatin (LIPITOR) 20 MG tablet, Take 1 tablet by mouth every night at bedtime., Disp: 90 tablet, Rfl: 3  •  lisinopril (PRINIVIL,ZESTRIL) 5 MG tablet, Take 1 tablet by mouth Daily., Disp: 90 tablet, Rfl: 3  •  metoprolol succinate XL (TOPROL-XL) 25 MG 24 hr tablet, Take 1 tablet by mouth Daily., Disp: 90 tablet, Rfl: 3    Family History:  Family History   Problem " Relation Age of Onset   • Hypertension Mother    • Hypertension Father        Past Medical History:  Past Medical History:   Diagnosis Date   • Heart murmur    • Hyperlipidemia    • Hypertension        Past surgical History:  Past Surgical History:   Procedure Laterality Date   • APPENDECTOMY         Social History:  Social History     Socioeconomic History   • Marital status:    Tobacco Use   • Smoking status: Former Smoker     Types: Cigarettes   • Smokeless tobacco: Never Used   Vaping Use   • Vaping Use: Never used   Substance and Sexual Activity   • Alcohol use: Yes     Comment: Rare    • Drug use: No   • Sexual activity: Defer       Review of Systems:  The following systems were reviewed as they relate to the cardiovascular system: Constitutional, Eyes, ENT, Cardiovascular, Respiratory, Gastrointestinal, Integumentary, Neurological, Psychiatric, Hematologic, Endocrine, Musculoskeletal, and Genitourinary. The pertinent cardiovascular findings are reported above with all other cardiovascular points within those systems being negative.    Diagnostic Study Review:     Current Electrocardiogram:    ECG 12 Lead    Date/Time: 3/16/2022 2:33 PM  Performed by: Brittanie Lopez MD  Authorized by: Brittanie Lopez MD   Comparison: compared with previous ECG   Similar to previous ECG  Rhythm: sinus rhythm  Rate: normal  BPM: 72  Conduction: conduction normal  ST Segments: ST segments normal  T Waves: T waves normal  QRS axis: normal    Clinical impression: normal ECG              NOTE: The following portions of the patient's history were reviewed and updated this visit as appropriate: allergies, current medications, past family history, past medical history, past social history, past surgical history and problem list.

## 2022-09-28 ENCOUNTER — OFFICE VISIT (OUTPATIENT)
Dept: CARDIOLOGY | Facility: CLINIC | Age: 57
End: 2022-09-28

## 2022-09-28 VITALS
WEIGHT: 183 LBS | SYSTOLIC BLOOD PRESSURE: 137 MMHG | BODY MASS INDEX: 25.62 KG/M2 | OXYGEN SATURATION: 99 % | RESPIRATION RATE: 18 BRPM | HEIGHT: 71 IN | HEART RATE: 71 BPM | DIASTOLIC BLOOD PRESSURE: 75 MMHG

## 2022-09-28 DIAGNOSIS — I35.9 AORTIC VALVE DISORDER: ICD-10-CM

## 2022-09-28 DIAGNOSIS — I77.810 AORTIC ROOT DILATION: ICD-10-CM

## 2022-09-28 DIAGNOSIS — E78.2 MIXED HYPERLIPIDEMIA: ICD-10-CM

## 2022-09-28 DIAGNOSIS — I77.810 THORACIC AORTIC ECTASIA: ICD-10-CM

## 2022-09-28 DIAGNOSIS — I71.20 THORACIC AORTIC ANEURYSM WITHOUT RUPTURE: ICD-10-CM

## 2022-09-28 DIAGNOSIS — I10 PRIMARY HYPERTENSION: ICD-10-CM

## 2022-09-28 DIAGNOSIS — I35.1 NONRHEUMATIC AORTIC VALVE INSUFFICIENCY: Primary | ICD-10-CM

## 2022-09-28 PROCEDURE — 93000 ELECTROCARDIOGRAM COMPLETE: CPT | Performed by: INTERNAL MEDICINE

## 2022-09-28 PROCEDURE — 99214 OFFICE O/P EST MOD 30 MIN: CPT | Performed by: INTERNAL MEDICINE

## 2022-09-28 NOTE — PROGRESS NOTES
Cardiology Office Visit      Encounter Date:  09/28/2022    Patient ID:   Sami Quiroga is a 57 y.o. male.      Reason For Followup:  Aortic regurgitation and ascending aortic aneurysm    Brief Clinical History:  Dear Dr. Mcintosh, Renard Pope MD    I had the pleasure of seeing Sami Quiroga today. As you are well aware, this is a 57 y.o. male  with history of aortic valve disorder with aortic insufficiency presents to my office for followup.  Patient is currently stable without symptoms of chest pain or shortness of breath at rest or exertion.  No palpitations But has occasional dizziness  without any syncope.No complaints of any PND or orthopnea.  No swelling of the feet.  Patient has been taking all is medicines.    Interpretation Summary    · Estimated left ventricular EF = 60% Estimated left ventricular EF was in agreement with the calculated left ventricular EF. Left ventricular systolic function is normal.  · Moderate dilation of the aortic root is present. Severe dilation of the proximal aorta is present. Severe dilation of the ascending aorta is present.  · Moderate aortic valve regurgitation is present.  · Estimated right ventricular systolic pressure from tricuspid regurgitation is mildly elevated (35-45 mmHg).  · Mild pulmonary hypertension is present.  · Consider CT chest to further evaluate the ascending aortic aneurysm         Interval History:  Denies any symptoms of chest pain shortness of breath dizziness or syncope  Denies any new cardiac symptoms  No significant change in the functional status  Assessment & Plan    Impressions:  Aortic regurgitation, moderate  Bicuspid aortic valve  bicuspid aortic valve is suggested with fused non-coronary cusps  Ascending aortic aneurysm  Hypertension  Prior two echocardiograms were reviewed with the patient/aneurysm size on most recent echo 4.3 cm  Moderate aortic insufficiency  CT angiogram of the aorta shows aortic root size of 4.3 cm and ascending  "aorta of 4.7 cm/unchanged in size from 20 18-20 21      Recommendations:  Goal systolic blood pressure less than 130  goal LDL less than 70  Continue home blood pressure monitoring  Findings of the CT chest and prior echocardiogram reviewed and discussed with the patient  Repeat echocardiogram with only mild to moderate aortic regurgitation and no significant change in the size of the ascending aortic aneurysm  Recent labs reviewed and discussed with the patient  Likely consider surgical evaluation of the aneurysm size is more than 5 cm  Repeat CT chest to further assess the aortic aneurysm  Home blood pressure readings are optimal    follow-up in office in 6 months    Objective:    Vitals:  Vitals:    09/28/22 1404   BP: 137/75   BP Location: Left arm   Patient Position: Sitting   Cuff Size: Large Adult   Pulse: 71   Resp: 18   SpO2: 99%   Weight: 83 kg (183 lb)   Height: 180.3 cm (71\")       Physical Exam:    General: Alert, cooperative, no distress, appears stated age  Head:  Normocephalic, atraumatic, mucous membranes moist  Eyes:  Conjunctiva/corneas clear, EOM's intact     Neck:  Supple,  no adenopathy;      Lungs: Clear to auscultation bilaterally, no wheezes rhonchi rales are noted  Chest wall: No tenderness  Heart::  Regular rate and rhythm, S1 and S2 normal, no murmur, rub or gallop  Abdomen: Soft, non-tender, nondistended bowel sounds active  Extremities: No cyanosis, clubbing, or edema  Pulses: 2+ and symmetric all extremities  Skin:  No rashes or lesions  Neuro/psych: A&O x3. CN II through XII are grossly intact with appropriate affect      Allergies:  No Known Allergies    Medication Review:     Current Outpatient Medications:   •  atorvastatin (LIPITOR) 20 MG tablet, Take 1 tablet by mouth every night at bedtime., Disp: 90 tablet, Rfl: 3  •  lisinopril (PRINIVIL,ZESTRIL) 5 MG tablet, Take 1 tablet by mouth Daily., Disp: 90 tablet, Rfl: 3  •  metoprolol succinate XL (TOPROL-XL) 25 MG 24 hr tablet, Take " 1 tablet by mouth Daily., Disp: 90 tablet, Rfl: 3    Family History:  Family History   Problem Relation Age of Onset   • Hypertension Mother    • Hypertension Father        Past Medical History:  Past Medical History:   Diagnosis Date   • Heart murmur    • Hyperlipidemia    • Hypertension        Past surgical History:  Past Surgical History:   Procedure Laterality Date   • APPENDECTOMY         Social History:  Social History     Socioeconomic History   • Marital status:    Tobacco Use   • Smoking status: Former Smoker     Types: Cigarettes   • Smokeless tobacco: Never Used   Vaping Use   • Vaping Use: Never used   Substance and Sexual Activity   • Alcohol use: Yes     Comment: Rare    • Drug use: No   • Sexual activity: Defer       Review of Systems:  The following systems were reviewed as they relate to the cardiovascular system: Constitutional, Eyes, ENT, Cardiovascular, Respiratory, Gastrointestinal, Integumentary, Neurological, Psychiatric, Hematologic, Endocrine, Musculoskeletal, and Genitourinary. The pertinent cardiovascular findings are reported above with all other cardiovascular points within those systems being negative.    Diagnostic Study Review:     Current Electrocardiogram:    ECG 12 Lead    Date/Time: 9/28/2022 3:37 PM  Performed by: Brittanie Lopez MD  Authorized by: Brittanie Lopez MD   Comparison: compared with previous ECG   Similar to previous ECG  Rhythm: sinus rhythm  Rate: normal  BPM: 70  Conduction: conduction normal  ST Segments: ST segments normal  T Waves: T waves normal  QRS axis: normal    Clinical impression: normal ECG              NOTE: The following portions of the patient's history were reviewed and updated this visit as appropriate: allergies, current medications, past family history, past medical history, past social history, past surgical history and problem list.

## 2022-10-13 NOTE — PROGRESS NOTES
Subjective   Sami Quiroga is a 57 y.o. male.   Chief Complaint   Patient presents with   • Annual Exam       History of Present Illness  The patient is here: for coordination of medical care.  Patient has: moderate activity with work/home activities, good appetite, feels well with minor complaints, good energy level and is sleeping well    TDAP/TD VACCINES(1 - Tdap) Never done  ZOSTER VACCINE(1 of 2) Never done  HEPATITIS C SCREENING Never done  LIPID PANEL due on 10/05/2021  ANNUAL PHYSICAL due on 10/06/2021  COVID-19 Vaccine(4 - Booster for Pfizer series) due on 11/23/2021  INFLUENZA VACCINE Never done  COLORECTAL CANCER SCREENING due on 07/29/2026  Pneumococcal Vaccine 0-64 Aged Out  Current pain scale 0/10.    Hypertension  This is a chronic problem. The current episode started more than 1 year ago. The problem is controlled. Pertinent negatives include no chest pain, neck pain or shortness of breath. Risk factors for coronary artery disease include male gender and dyslipidemia. Current antihypertension treatment includes ACE inhibitors and beta blockers.        The following portions of the patient's history were reviewed and updated as appropriate: allergies, current medications, past family history, past medical history, past social history, past surgical history and problem list.    Patient Active Problem List   Diagnosis   • Heart murmur   • Hypertension   • Hyperlipidemia   • Nonrheumatic aortic valve insufficiency   • Thoracic aortic aneurysm without rupture   • Thoracic aortic ectasia (HCC)   • Nocturia   • Aortic valve disorder   • Aortic root dilation (HCC)   • Anxiety   • Allergic rhinitis       Current Outpatient Medications on File Prior to Visit   Medication Sig Dispense Refill   • atorvastatin (LIPITOR) 20 MG tablet Take 1 tablet by mouth every night at bedtime. 90 tablet 3   • lisinopril (PRINIVIL,ZESTRIL) 5 MG tablet Take 1 tablet by mouth Daily. 90 tablet 3   • metoprolol succinate XL  "(TOPROL-XL) 25 MG 24 hr tablet Take 1 tablet by mouth Daily. 90 tablet 3     No current facility-administered medications on file prior to visit.     Current outpatient and discharge medications have been reconciled for the patient.  Reviewed by: Renard Mcintosh MD      No Known Allergies    Review of Systems   Constitutional: Negative for activity change, appetite change, fatigue and fever.   HENT: Negative for ear pain, swollen glands and voice change.    Eyes: Negative for visual disturbance.   Respiratory: Negative for shortness of breath and wheezing.    Cardiovascular: Negative for chest pain and leg swelling.   Gastrointestinal: Negative for abdominal pain, blood in stool, constipation, diarrhea, nausea and vomiting.   Endocrine: Negative for polydipsia and polyuria.   Genitourinary: Negative for dysuria, frequency and hematuria.   Musculoskeletal: Negative for joint swelling, neck pain and neck stiffness.   Skin: Negative for rash and wound.   Neurological: Negative for weakness, numbness and headache.   Psychiatric/Behavioral: Negative for suicidal ideas and depressed mood.     I have reviewed and confirmed the accuracy of the ROS as documented by the MA/LPN/RN Renard Mcintosh MD    Objective   Visit Vitals  /76 (BP Location: Right arm, Patient Position: Sitting, Cuff Size: Adult)   Pulse 76   Temp 98 °F (36.7 °C)   Resp 18   Ht 180.3 cm (71\")   Wt 87 kg (191 lb 12.8 oz)   SpO2 99%   BMI 26.75 kg/m²       Physical Exam  Constitutional:       Appearance: He is well-developed.   HENT:      Head: Normocephalic and atraumatic.      Right Ear: External ear normal.      Left Ear: External ear normal.      Nose: Nose normal.   Eyes:      Pupils: Pupils are equal, round, and reactive to light.   Cardiovascular:      Rate and Rhythm: Normal rate and regular rhythm.      Heart sounds: Normal heart sounds.   Pulmonary:      Effort: Pulmonary effort is normal.      Breath sounds: Normal breath " sounds.   Abdominal:      General: Bowel sounds are normal.      Palpations: Abdomen is soft.   Musculoskeletal:         General: Normal range of motion.      Cervical back: Normal range of motion and neck supple.   Skin:     General: Skin is warm and dry.   Neurological:      Mental Status: He is alert and oriented to person, place, and time.   Psychiatric:         Behavior: Behavior normal.         Thought Content: Thought content normal.         Judgment: Judgment normal.       Derm Physical Exam    Diagnoses and all orders for this visit:    1. Annual physical exam (Primary)  -     POC Urinalysis Dipstick, Automated  -     Comprehensive Metabolic Panel  -     Lipid Panel With / Chol / HDL Ratio  -     PSA Screen  -     TSH  -     Hemoglobin A1c  -     CBC & Differential  -     Hepatitis C Antibody    2. Primary hypertension  Assessment & Plan:  Appears to be at goal. Followed by cardiology       3. Need for hepatitis C screening test  -     Hepatitis C Antibody    4. Prostate cancer screening  -     PSA Screen    5. Aortic root dilation (HCC)  Overview:  4.25 cm on echo noted incidentially. Followed by Dr Daugherty.     Discussed anticipatory guidance, diet, exercise, and weight loss.  Discussed safety and routine screening examinations.  Discussed self-examinations.  BMI is >= 25 and <30. (Overweight) The following options were offered after discussion;: weight loss educational material (shared in after visit summary)     Sami Quiroga  reports that he has quit smoking. His smoking use included cigarettes. He has never used smokeless tobacco..  Follow-up for routine health maintenance as indicated.     Expected course, medications, and adverse effects discussed as appropriate.  Call or return if worsening or persistent symptoms.  I wore protective equipment throughout this patient encounter to include mask. Hand hygiene was performed before donning protective equipment and after removal when leaving the  room.       This document is intended for medical expert use only. Reading of this document by patients and/or patient's family without participating medical staff guidance may result in misinterpretation and unintended morbidity. Any interpretation of such data is the responsibility of the patient and/or family member responsible for the patient in concert with their primary or specialist providers, not to be left for sources of online searches such as [employer], [employer] or similar queries. Relying on these approaches to knowledge may result in misinterpretation, misguided goals of care and even death should patients or family members try recommendations outside of the realm of professional medical care.

## 2022-10-18 ENCOUNTER — OFFICE VISIT (OUTPATIENT)
Dept: FAMILY MEDICINE CLINIC | Facility: CLINIC | Age: 57
End: 2022-10-18

## 2022-10-18 VITALS
OXYGEN SATURATION: 99 % | DIASTOLIC BLOOD PRESSURE: 76 MMHG | TEMPERATURE: 98 F | RESPIRATION RATE: 18 BRPM | HEART RATE: 76 BPM | SYSTOLIC BLOOD PRESSURE: 122 MMHG | WEIGHT: 191.8 LBS | BODY MASS INDEX: 26.85 KG/M2 | HEIGHT: 71 IN

## 2022-10-18 DIAGNOSIS — I10 PRIMARY HYPERTENSION: ICD-10-CM

## 2022-10-18 DIAGNOSIS — Z00.00 ANNUAL PHYSICAL EXAM: Primary | ICD-10-CM

## 2022-10-18 DIAGNOSIS — Z11.59 NEED FOR HEPATITIS C SCREENING TEST: ICD-10-CM

## 2022-10-18 DIAGNOSIS — I77.810 AORTIC ROOT DILATION: ICD-10-CM

## 2022-10-18 DIAGNOSIS — Z12.5 PROSTATE CANCER SCREENING: ICD-10-CM

## 2022-10-18 LAB
BILIRUB BLD-MCNC: NEGATIVE MG/DL
CLARITY, POC: CLEAR
COLOR UR: YELLOW
EXPIRATION DATE: ABNORMAL
GLUCOSE UR STRIP-MCNC: NEGATIVE MG/DL
KETONES UR QL: NEGATIVE
LEUKOCYTE EST, POC: NEGATIVE
Lab: ABNORMAL
NITRITE UR-MCNC: NEGATIVE MG/ML
PH UR: 5 [PH] (ref 5–8)
PROT UR STRIP-MCNC: ABNORMAL MG/DL
RBC # UR STRIP: NEGATIVE /UL
SP GR UR: 1.03 (ref 1–1.03)
UROBILINOGEN UR QL: NORMAL

## 2022-10-18 PROCEDURE — 81003 URINALYSIS AUTO W/O SCOPE: CPT | Performed by: FAMILY MEDICINE

## 2022-10-18 PROCEDURE — 99396 PREV VISIT EST AGE 40-64: CPT | Performed by: FAMILY MEDICINE

## 2022-10-19 LAB
ALBUMIN SERPL-MCNC: 5 G/DL (ref 3.8–4.9)
ALBUMIN/GLOB SERPL: 2 {RATIO} (ref 1.2–2.2)
ALP SERPL-CCNC: 75 IU/L (ref 44–121)
ALT SERPL-CCNC: 27 IU/L (ref 0–44)
AST SERPL-CCNC: 19 IU/L (ref 0–40)
BASOPHILS # BLD AUTO: 0.1 X10E3/UL (ref 0–0.2)
BASOPHILS NFR BLD AUTO: 1 %
BILIRUB SERPL-MCNC: 0.7 MG/DL (ref 0–1.2)
BUN SERPL-MCNC: 12 MG/DL (ref 6–24)
BUN/CREAT SERPL: 12 (ref 9–20)
CALCIUM SERPL-MCNC: 9.5 MG/DL (ref 8.7–10.2)
CHLORIDE SERPL-SCNC: 103 MMOL/L (ref 96–106)
CHOLEST SERPL-MCNC: 136 MG/DL (ref 100–199)
CHOLEST/HDLC SERPL: 3.7 RATIO (ref 0–5)
CO2 SERPL-SCNC: 24 MMOL/L (ref 20–29)
CREAT SERPL-MCNC: 1.01 MG/DL (ref 0.76–1.27)
EGFRCR SERPLBLD CKD-EPI 2021: 87 ML/MIN/1.73
EOSINOPHIL # BLD AUTO: 0.1 X10E3/UL (ref 0–0.4)
EOSINOPHIL NFR BLD AUTO: 1 %
ERYTHROCYTE [DISTWIDTH] IN BLOOD BY AUTOMATED COUNT: 13.2 % (ref 11.6–15.4)
GLOBULIN SER CALC-MCNC: 2.5 G/DL (ref 1.5–4.5)
GLUCOSE SERPL-MCNC: 138 MG/DL (ref 70–99)
HBA1C MFR BLD: 5.7 % (ref 4.8–5.6)
HCT VFR BLD AUTO: 47 % (ref 37.5–51)
HCV AB S/CO SERPL IA: <0.1 S/CO RATIO (ref 0–0.9)
HDLC SERPL-MCNC: 37 MG/DL
HGB BLD-MCNC: 15.4 G/DL (ref 13–17.7)
IMM GRANULOCYTES # BLD AUTO: 0 X10E3/UL (ref 0–0.1)
IMM GRANULOCYTES NFR BLD AUTO: 0 %
LDLC SERPL CALC-MCNC: 66 MG/DL (ref 0–99)
LYMPHOCYTES # BLD AUTO: 1 X10E3/UL (ref 0.7–3.1)
LYMPHOCYTES NFR BLD AUTO: 15 %
MCH RBC QN AUTO: 28.5 PG (ref 26.6–33)
MCHC RBC AUTO-ENTMCNC: 32.8 G/DL (ref 31.5–35.7)
MCV RBC AUTO: 87 FL (ref 79–97)
MONOCYTES # BLD AUTO: 0.4 X10E3/UL (ref 0.1–0.9)
MONOCYTES NFR BLD AUTO: 6 %
NEUTROPHILS # BLD AUTO: 5.4 X10E3/UL (ref 1.4–7)
NEUTROPHILS NFR BLD AUTO: 77 %
PLATELET # BLD AUTO: 184 X10E3/UL (ref 150–450)
POTASSIUM SERPL-SCNC: 4.5 MMOL/L (ref 3.5–5.2)
PROT SERPL-MCNC: 7.5 G/DL (ref 6–8.5)
PSA SERPL-MCNC: 4.1 NG/ML (ref 0–4)
RBC # BLD AUTO: 5.4 X10E6/UL (ref 4.14–5.8)
SODIUM SERPL-SCNC: 142 MMOL/L (ref 134–144)
TRIGL SERPL-MCNC: 198 MG/DL (ref 0–149)
TSH SERPL DL<=0.005 MIU/L-ACNC: 2.23 UIU/ML (ref 0.45–4.5)
VLDLC SERPL CALC-MCNC: 33 MG/DL (ref 5–40)
WBC # BLD AUTO: 7 X10E3/UL (ref 3.4–10.8)

## 2022-10-20 ENCOUNTER — TELEPHONE (OUTPATIENT)
Dept: FAMILY MEDICINE CLINIC | Facility: CLINIC | Age: 57
End: 2022-10-20

## 2022-10-20 NOTE — TELEPHONE ENCOUNTER
----- Message from Renard Mcintosh MD sent at 10/20/2022  9:50 AM EDT -----  Please notify patient that:   Labs overall ok except PSA creeping up steadily. [facility] urology eval

## 2022-10-31 DIAGNOSIS — R97.20 ELEVATED PSA: Primary | ICD-10-CM

## 2022-11-09 ENCOUNTER — TELEPHONE (OUTPATIENT)
Dept: FAMILY MEDICINE CLINIC | Facility: CLINIC | Age: 57
End: 2022-11-09

## 2022-11-10 ENCOUNTER — TELEPHONE (OUTPATIENT)
Dept: FAMILY MEDICINE CLINIC | Facility: CLINIC | Age: 57
End: 2022-11-10

## 2022-11-10 NOTE — TELEPHONE ENCOUNTER
Mr vaughan came in today wanting to talk about one of his lab results and some of his medications. Call back [phone]

## 2022-11-21 ENCOUNTER — APPOINTMENT (OUTPATIENT)
Dept: CT IMAGING | Facility: HOSPITAL | Age: 57
End: 2022-11-21

## 2022-12-12 ENCOUNTER — HOSPITAL ENCOUNTER (OUTPATIENT)
Dept: CT IMAGING | Facility: HOSPITAL | Age: 57
Discharge: HOME | End: 2022-12-12
Admitting: INTERNAL MEDICINE

## 2022-12-12 LAB
CREAT BLDA-MCNC: 1.3 MG/DL (ref 0.6–1.3)
EGFRCR SERPLBLD CKD-EPI 2021: 64.5 ML/MIN/1.73

## 2022-12-12 PROCEDURE — 71275 CT ANGIOGRAPHY CHEST: CPT

## 2022-12-12 PROCEDURE — 0 IOPAMIDOL PER 1 ML: Performed by: INTERNAL MEDICINE

## 2022-12-12 PROCEDURE — 82565 ASSAY OF CREATININE: CPT

## 2022-12-12 RX ADMIN — IOPAMIDOL 100 ML: 755 INJECTION, SOLUTION INTRAVENOUS at 12:54

## 2022-12-15 ENCOUNTER — TELEPHONE (OUTPATIENT)
Dept: CARDIOLOGY | Facility: CLINIC | Age: 57
End: 2022-12-15

## 2022-12-15 NOTE — TELEPHONE ENCOUNTER
Called and notified, patient verbalized understanding    --------------------------    Brittanie Lopez MD Melissa, MA      Ascending aortic aneurysm size is unchanged from before   Maximal diameter 4.7 cm/able size compared to before

## 2023-03-12 DIAGNOSIS — E78.2 MIXED HYPERLIPIDEMIA: ICD-10-CM

## 2023-03-13 RX ORDER — ATORVASTATIN CALCIUM 20 MG/1
TABLET, FILM COATED ORAL
Qty: 90 TABLET | Refills: 3 | Status: SHIPPED | OUTPATIENT
Start: 2023-03-13

## 2023-04-24 RX ORDER — TAMSULOSIN HYDROCHLORIDE 0.4 MG/1
CAPSULE ORAL
COMMUNITY
Start: 2023-03-08

## 2023-04-26 ENCOUNTER — OFFICE VISIT (OUTPATIENT)
Dept: CARDIOLOGY | Facility: CLINIC | Age: 58
End: 2023-04-26
Payer: COMMERCIAL

## 2023-04-26 VITALS
BODY MASS INDEX: 27.58 KG/M2 | DIASTOLIC BLOOD PRESSURE: 87 MMHG | RESPIRATION RATE: 18 BRPM | HEART RATE: 87 BPM | SYSTOLIC BLOOD PRESSURE: 136 MMHG | OXYGEN SATURATION: 97 % | WEIGHT: 197 LBS | HEIGHT: 71 IN

## 2023-04-26 DIAGNOSIS — I77.810 AORTIC ROOT DILATION: ICD-10-CM

## 2023-04-26 DIAGNOSIS — I35.9 AORTIC VALVE DISORDER: ICD-10-CM

## 2023-04-26 DIAGNOSIS — I10 PRIMARY HYPERTENSION: ICD-10-CM

## 2023-04-26 DIAGNOSIS — E78.2 MIXED HYPERLIPIDEMIA: ICD-10-CM

## 2023-04-26 DIAGNOSIS — I71.21 ANEURYSM OF ASCENDING AORTA WITHOUT RUPTURE: ICD-10-CM

## 2023-04-26 DIAGNOSIS — I35.1 NONRHEUMATIC AORTIC VALVE INSUFFICIENCY: Primary | ICD-10-CM

## 2023-04-26 PROCEDURE — 99214 OFFICE O/P EST MOD 30 MIN: CPT | Performed by: INTERNAL MEDICINE

## 2023-04-26 PROCEDURE — 93000 ELECTROCARDIOGRAM COMPLETE: CPT | Performed by: INTERNAL MEDICINE

## 2023-04-26 NOTE — PROGRESS NOTES
Cardiology Office Visit      Encounter Date:  04/26/2023    Patient ID:   Sami Quiroga is a 57 y.o. male.    Reason For Followup:  Aortic regurgitation and ascending aortic aneurysm    Brief Clinical History:  Dear Dr. Mcintosh, Renard Pope MD    I had the pleasure of seeing Sami Quiroga today. As you are well aware, this is a 57 y.o. male  with history of aortic valve disorder with aortic insufficiency presents to my office for followup.  Patient is currently stable without symptoms of chest pain or shortness of breath at rest or exertion.  No palpitations But has occasional dizziness  without any syncope.No complaints of any PND or orthopnea.  No swelling of the feet.  Patient has been taking all is medicines.    Interpretation Summary    · Estimated left ventricular EF = 60% Estimated left ventricular EF was in agreement with the calculated left ventricular EF. Left ventricular systolic function is normal.  · Moderate dilation of the aortic root is present. Severe dilation of the proximal aorta is present. Severe dilation of the ascending aorta is present.  · Moderate aortic valve regurgitation is present.  · Estimated right ventricular systolic pressure from tricuspid regurgitation is mildly elevated (35-45 mmHg).  · Mild pulmonary hypertension is present.  · Consider CT chest to further evaluate the ascending aortic aneurysm         Interval History:  Denies any symptoms of chest pain shortness of breath dizziness or syncope  Denies any new cardiac symptoms  No significant change in the functional status  Assessment & Plan    Impressions:  Aortic regurgitation, moderate  Bicuspid aortic valve  bicuspid aortic valve is suggested with fused non-coronary cusps  Ascending aortic aneurysm  Hypertension  Prior two echocardiograms were reviewed with the patient/aneurysm size on most recent echo 4.3 cm  Moderate aortic insufficiency  CT angiogram of the aorta shows aortic root size of 4.3 cm and ascending aorta  "of 4.7 cm      Recommendations:  Goal systolic blood pressure less than 130  goal LDL less than 70  Continue home blood pressure monitoring  Findings of the CT chest and prior echocardiogram reviewed and discussed with the patient  Repeat echocardiogram with only mild to moderate aortic regurgitation and no significant change in the size of the ascending aortic aneurysm  Recent labs reviewed and discussed with the patient  Likely consider surgical evaluation of the aneurysm size is more than 5 cm  Repeat CT chest to further assess the aortic aneurysm  Home blood pressure readings are optimal  Repeat CT chest in December 2023 to assess the aortic root and ascending aortic aneurysm  Prior work-up reviewed and discussed with the patient  Prior labs and work-up reviewed and discussed with the patient  Continue current medical therapy with Toprol-XL 25 mg p.o. once a day lisinopril 5 mg p.o. once a day Lipitor 20 mg p.o. once a day  follow-up in office in 6 months    Objective:    Vitals:  Vitals:    04/26/23 1304   BP: 136/87   BP Location: Left arm   Patient Position: Sitting   Cuff Size: Large Adult   Pulse: 87   Resp: 18   SpO2: 97%   Weight: 89.4 kg (197 lb)   Height: 180.3 cm (71\")       Physical Exam:    General: Alert, cooperative, no distress, appears stated age  Head:  Normocephalic, atraumatic, mucous membranes moist  Eyes:  Conjunctiva/corneas clear, EOM's intact     Neck:  Supple,  no adenopathy;      Lungs: Clear to auscultation bilaterally, no wheezes rhonchi rales are noted  Chest wall: No tenderness  Heart::  Regular rate and rhythm, S1 and S2 normal, no murmur, rub or gallop  Abdomen: Soft, non-tender, nondistended bowel sounds active  Extremities: No cyanosis, clubbing, or edema  Pulses: 2+ and symmetric all extremities  Skin:  No rashes or lesions  Neuro/psych: A&O x3. CN II through XII are grossly intact with appropriate affect      Allergies:  No Known Allergies    Medication Review:     Current " Outpatient Medications:   •  atorvastatin (LIPITOR) 20 MG tablet, TAKE 1 TABLET BY MOUTH EVERYDAY AT BEDTIME, Disp: 90 tablet, Rfl: 3  •  lisinopril (PRINIVIL,ZESTRIL) 5 MG tablet, Take 1 tablet by mouth Daily., Disp: 90 tablet, Rfl: 3  •  metoprolol succinate XL (TOPROL-XL) 25 MG 24 hr tablet, Take 1 tablet by mouth Daily., Disp: 90 tablet, Rfl: 3  •  tamsulosin (FLOMAX) 0.4 MG capsule 24 hr capsule, , Disp: , Rfl:     Family History:  Family History   Problem Relation Age of Onset   • Hypertension Mother    • Hypertension Father        Past Medical History:  Past Medical History:   Diagnosis Date   • Heart murmur    • Hyperlipidemia    • Hypertension        Past surgical History:  Past Surgical History:   Procedure Laterality Date   • APPENDECTOMY         Social History:  Social History     Socioeconomic History   • Marital status:    Tobacco Use   • Smoking status: Former     Types: Cigarettes   • Smokeless tobacco: Never   Vaping Use   • Vaping Use: Never used   Substance and Sexual Activity   • Alcohol use: Yes     Comment: Rare    • Drug use: No   • Sexual activity: Defer       Review of Systems:  The following systems were reviewed as they relate to the cardiovascular system: Constitutional, Eyes, ENT, Cardiovascular, Respiratory, Gastrointestinal, Integumentary, Neurological, Psychiatric, Hematologic, Endocrine, Musculoskeletal, and Genitourinary. The pertinent cardiovascular findings are reported above with all other cardiovascular points within those systems being negative.    Diagnostic Study Review:     Current Electrocardiogram:    ECG 12 Lead    Date/Time: 4/26/2023 4:44 PM  Performed by: Brittanie Lopez MD  Authorized by: Brittanie Lopez MD   Comparison: compared with previous ECG   Similar to previous ECG  Rhythm: sinus rhythm  Rate: normal  BPM: 87  Conduction: conduction normal  ST Segments: ST segments normal  T Waves: T waves normal  QRS axis: normal    Clinical impression:  normal ECG              NOTE: The following portions of the patient's history were reviewed and updated this visit as appropriate: allergies, current medications, past family history, past medical history, past social history, past surgical history and problem list.

## 2023-04-28 RX ORDER — METOPROLOL SUCCINATE 25 MG/1
TABLET, EXTENDED RELEASE ORAL
Qty: 90 TABLET | Refills: 3 | Status: SHIPPED | OUTPATIENT
Start: 2023-04-28

## 2023-04-28 RX ORDER — LISINOPRIL 5 MG/1
TABLET ORAL
Qty: 90 TABLET | Refills: 3 | Status: SHIPPED | OUTPATIENT
Start: 2023-04-28

## 2023-05-18 ENCOUNTER — TRANSCRIBE ORDERS (OUTPATIENT)
Dept: ADMINISTRATIVE | Facility: HOSPITAL | Age: 58
End: 2023-05-18
Payer: COMMERCIAL

## 2023-05-18 DIAGNOSIS — R97.20 ELEVATED PSA: Primary | ICD-10-CM

## 2023-10-25 ENCOUNTER — OFFICE VISIT (OUTPATIENT)
Dept: CARDIOLOGY | Facility: CLINIC | Age: 58
End: 2023-10-25
Payer: COMMERCIAL

## 2023-10-25 VITALS
HEART RATE: 76 BPM | WEIGHT: 185 LBS | OXYGEN SATURATION: 98 % | HEIGHT: 71 IN | DIASTOLIC BLOOD PRESSURE: 84 MMHG | BODY MASS INDEX: 25.9 KG/M2 | SYSTOLIC BLOOD PRESSURE: 142 MMHG

## 2023-10-25 DIAGNOSIS — I77.810 THORACIC AORTIC ECTASIA: ICD-10-CM

## 2023-10-25 DIAGNOSIS — I35.1 NONRHEUMATIC AORTIC VALVE INSUFFICIENCY: Primary | ICD-10-CM

## 2023-10-25 DIAGNOSIS — I35.9 AORTIC VALVE DISORDER: ICD-10-CM

## 2023-10-25 DIAGNOSIS — I71.21 ANEURYSM OF ASCENDING AORTA WITHOUT RUPTURE: ICD-10-CM

## 2023-10-25 DIAGNOSIS — I77.810 AORTIC ROOT DILATION: ICD-10-CM

## 2023-10-25 DIAGNOSIS — I10 PRIMARY HYPERTENSION: ICD-10-CM

## 2023-10-25 PROCEDURE — 93000 ELECTROCARDIOGRAM COMPLETE: CPT | Performed by: INTERNAL MEDICINE

## 2023-10-25 PROCEDURE — 99214 OFFICE O/P EST MOD 30 MIN: CPT | Performed by: INTERNAL MEDICINE

## 2023-10-25 NOTE — PROGRESS NOTES
Cardiology Office Visit      Encounter Date:  10/25/2023    Patient ID:   Sami Quiroga is a 58 y.o. male.    Reason For Followup:  Aortic regurgitation and ascending aortic aneurysm    Brief Clinical History:  Dear Dr. Mcintosh, Renard Pope MD    I had the pleasure of seeing Sami Quiroga today. As you are well aware, this is a 57 y.o. male  with history of aortic valve disorder with aortic insufficiency presents to my office for followup.  Patient is currently stable without symptoms of chest pain or shortness of breath at rest or exertion.  No palpitations But has occasional dizziness  without any syncope.No complaints of any PND or orthopnea.  No swelling of the feet.  Patient has been taking all is medicines.    Interpretation Summary    Estimated left ventricular EF = 60% Estimated left ventricular EF was in agreement with the calculated left ventricular EF. Left ventricular systolic function is normal.  Moderate dilation of the aortic root is present. Severe dilation of the proximal aorta is present. Severe dilation of the ascending aorta is present.  Moderate aortic valve regurgitation is present.  Estimated right ventricular systolic pressure from tricuspid regurgitation is mildly elevated (35-45 mmHg).  Mild pulmonary hypertension is present.  Consider CT chest to further evaluate the ascending aortic aneurysm         Interval History:  Denies any symptoms of chest pain shortness of breath dizziness or syncope  Denies any new cardiac symptoms  No significant change in the functional status  Assessment & Plan    Impressions:  Aortic regurgitation, moderate  Bicuspid aortic valve  bicuspid aortic valve is suggested with fused non-coronary cusps  Ascending aortic aneurysm  Hypertension  Moderate aortic insufficiency  CT angiogram of the aorta shows aortic root size of 4.3 cm and ascending aorta of 4.8 cm      Recommendations:  Goal systolic blood pressure less than 130  goal LDL less than 70  Continue  home blood pressure monitoring  Findings of the CT chest and prior echocardiogram reviewed and discussed with the patient  Repeat echocardiogram with only mild to moderate aortic regurgitation and no significant change in the size of the ascending aortic aneurysm  Recent labs reviewed and discussed with the patient  Referred to cardiothoracic surgery for further evaluation and treatment options  Repeat CT chest to further assess the aortic aneurysm  Home blood pressure readings are optimal  Repeat CT chest in December 2023 to assess the aortic root and ascending aortic aneurysm  Echocardiogram to reassess the severity of the aortic regurgitation  Prior work-up reviewed and discussed with the patient  Prior labs and work-up reviewed and discussed with the patient  Continue current medical therapy with Toprol-XL 25 mg p.o. once a day lisinopril 5 mg p.o. once a day Lipitor 20 mg p.o. once a day  follow-up in office in 6 months            Lab Results   Component Value Date    GLUCOSE 138 (H) 10/18/2022    BUN 12 10/18/2022    CREATININE 1.30 12/12/2022    EGFRRESULT 87 10/18/2022    EGFR 64.5 12/12/2022    BCR 12 10/18/2022    K 4.5 10/18/2022    CO2 24 10/18/2022    CALCIUM 9.5 10/18/2022    PROTENTOTREF 7.5 10/18/2022    ALBUMIN 5.0 (H) 10/18/2022    BILITOT 0.7 10/18/2022    AST 19 10/18/2022    ALT 27 10/18/2022     Results for orders placed during the hospital encounter of 12/13/21    Adult Transthoracic Echo Complete W/ Cont if Necessary Per Protocol    Interpretation Summary  · Estimated left ventricular EF = 65% Estimated left ventricular EF was in agreement with the calculated left ventricular EF. Left ventricular systolic function is normal.  · Mild dilation of the aortic root is present. Moderate dilation of the ascending aorta is present.  · Estimated right ventricular systolic pressure from tricuspid regurgitation is normal (<35 mmHg).  · Left ventricular diastolic function was normal.     No results found  for this or any previous visit.     Lab Results   Component Value Date    CHOL 142 10/15/2018    CHLPL 136 10/18/2022    TRIG 198 (H) 10/18/2022    HDL 37 (L) 10/18/2022    LDL 66 10/18/2022      Results for orders placed during the hospital encounter of 21    Treadmill Stress Test    Interpretation Summary  · No ECG evidence of myocardial ischemia. Negative clinical evidence of myocardial ischemia. Findings consistent with a normal ECG stress test.   Results for orders placed in visit on 17    CARDIOVASCULAR STUDIES - CONVERTED    Narrative  Adult Echocardiogram Report  [address] [address]  Name: MARI ERVIN EStudy Date: 2017 08:37 AM  MRN: [id number]        Patient Location:   : 1965       Gender: Male                              Height: 71 in  Age: 52 yrs           Account#: [id number]                     Weight: 186 lb  Reason For Study: Aortic valve disorder  BSA: 2.0 m2  History: HYPERTENSION, MURMUR, AORTIC ECTASIA  Ordering Physician:  ERNIE JASSO  Referring Physician:  QUEENIE LEUNG  Performed By: CHAYITO  M-Mode/2-D Measurements:  LVIDd: 5.0 cm       (3.7-5.7) LVPWd: 1.0 cm        (0.8-1.2)  LVIDs: 3.5 cm       (2.3-3.9)  ACS: 2.0 cm         (1.6-3.7) IVSd: 1.1 cm         (0.7-1.2)  LA dimension: 2.5 cm(1.9-4.0) RVDd: 1.7 cm         (0.7-2.4)  FS: 31.6 %          (21-40%)  Ao root diam: 3.7 cm (2.0-3.7)  Comments  The left ventricle is normal in size. There is normal left ventricular wall  thickness. Left ventricular systolic function is normal. Ejection Fraction is  60-65%. The left ventricular wall motion is normal. The right ventricle is  normal size. The right ventricular systolic function is normal. The left  atrial size is normal. Right atrial size is normal. The mitral valve is  grossly normal in structure. There is no mitral valve stenosis. There is mild  mitral regurgitation. The tricuspid valve is not well  visualized, but is  grossly normal. There is no tricuspid stenosis. There is mild tricuspid  regurgitation. Right ventricular systolic pressure is elevated at 40-50mmHg.  Aortic valve is trileaflet with satisfactory opening and coaptation. The  aortic valve opens well. No hemodynamically significant valvular aortic  stenosis. Mild to moderate aortic regurgitation. The pulmonic valve is not  well visualized. There is no pulmonic valvular stenosis. Trace pulmonic  valvular regurgitation. The aortic root is normal size. Dilated ascending  aorta ascending aorta measures 4.3 cm consider dedicated CT scan for further  evaluation. There is no pericardial effusion.  Interpretation  Dilated ascending aorta ascending aorta measures 4.3 cm consider dedicated CT  scan for further evaluation  Right ventricular systolic pressure is elevated at 40-50mmHg.  Left ventricular systolic function is normal.  Ejection Fraction is 60-65%  The left ventricular wall motion is normal.  MMode/2D Measurements T Calculations  ESV(Teich): 49.2 ml                 Ao root area: 10.8 cm2  EF(Teich): 59.2 %  LVOT diam: 2.3 cm                   EDV(MOD-sp4): 127.8 ml  ESV(MOD-sp4): 52.3 ml  EF(MOD-sp4): 59.0 %  Doppler Measurements T Calculations  MV E max vu: 93.7 cm/sec             MV max P.7 mmHg  MV A max vu: 84.4 cm/sec             MV mean P.5 mmHg  MV E/A: 1.1  Ao V2 max: 169.1 cm/sec               AI max vu: 428.9 cm/sec  Ao max P.4 mmHg                  AI max P.1 mmHg  Ao V2 mean: 110.3 cm/sec              AI dec slope: 223.7 cm/sec2  Ao mean P.7 mmHg                  AI dec time: 1.9 sec  Ao V2 VTI: 38.1 cm                    AI P1/2t: 561.5 msec  KAVITHA(I,D): 3.0 cm2  KAVITHA(V,D): 2.8 cm2  LV V1 max P.3 mmHg                MR max vu: 473.1 cm/sec  LV V1 mean P.9 mmHg               MR max P.6 mmHg  LV V1 max: 115.4 cm/sec  LV V1 mean: 78.3 cm/sec  LV V1 VTI: 27.7 cm  PA max PG: 3.4 mmHg                   PI  "end-d vu: 78.4 cm/sec  TR max vu: 264.3 cm/sec  TR max P.7 mmHg  RVSP(TR): 41.7 mmHg  _______________________________________________________________________________  Electronically signed by: MD Brittanie Lopez  on 2017 12:26 PM    Electronically signed by Brittanie Lopez MD on 2017 at 1:58 PM  ________________________________________________________________________      Disclaimer: Converted Note message may not contain all data elements that existed in the legacy source system. Please see [email or web address] for the original note details.           Objective:    Vitals:  Vitals:    10/25/23 1451   BP: 142/84   BP Location: Left arm   Patient Position: Sitting   Pulse: 76   SpO2: 98%   Weight: 83.9 kg (185 lb)   Height: 180.3 cm (71\")       Physical Exam:    General: Alert, cooperative, no distress, appears stated age  Head:  Normocephalic, atraumatic, mucous membranes moist  Eyes:  Conjunctiva/corneas clear, EOM's intact     Neck:  Supple,  no adenopathy;      Lungs: Clear to auscultation bilaterally, no wheezes rhonchi rales are noted  Chest wall: No tenderness  Heart::  Regular rate and rhythm, S1 and S2 normal, no murmur, rub or gallop  Abdomen: Soft, non-tender, nondistended bowel sounds active  Extremities: No cyanosis, clubbing, or edema  Pulses: 2+ and symmetric all extremities  Skin:  No rashes or lesions  Neuro/psych: A&O x3. CN II through XII are grossly intact with appropriate affect      Allergies:  No Known Allergies    Medication Review:     Current Outpatient Medications:     atorvastatin (LIPITOR) 20 MG tablet, TAKE 1 TABLET BY MOUTH EVERYDAY AT BEDTIME, Disp: 90 tablet, Rfl: 3    lisinopril (PRINIVIL,ZESTRIL) 5 MG tablet, TAKE 1 TABLET BY MOUTH EVERY DAY, Disp: 90 tablet, Rfl: 3    metoprolol succinate XL (TOPROL-XL) 25 MG 24 hr tablet, TAKE 1 TABLET BY MOUTH EVERY DAY, Disp: 90 tablet, Rfl: 3    tamsulosin (FLOMAX) 0.4 MG capsule 24 hr capsule, , Disp: , " Rfl:     Family History:  Family History   Problem Relation Age of Onset    Hypertension Mother     Hypertension Father        Past Medical History:  Past Medical History:   Diagnosis Date    Heart murmur     Hyperlipidemia     Hypertension        Past surgical History:  Past Surgical History:   Procedure Laterality Date    APPENDECTOMY         Social History:  Social History     Socioeconomic History    Marital status:    Tobacco Use    Smoking status: Former     Types: Cigarettes    Smokeless tobacco: Never   Vaping Use    Vaping Use: Never used   Substance and Sexual Activity    Alcohol use: Yes     Comment: Rare     Drug use: No    Sexual activity: Defer       Review of Systems:  The following systems were reviewed as they relate to the cardiovascular system: Constitutional, Eyes, ENT, Cardiovascular, Respiratory, Gastrointestinal, Integumentary, Neurological, Psychiatric, Hematologic, Endocrine, Musculoskeletal, and Genitourinary. The pertinent cardiovascular findings are reported above with all other cardiovascular points within those systems being negative.    Diagnostic Study Review:     Current Electrocardiogram:    ECG 12 Lead    Date/Time: 10/25/2023 3:22 PM  Performed by: Brittanie Lopez MD    Authorized by: Brittanie Lopez MD  Comparison: compared with previous ECG   Similar to previous ECG  Rhythm: sinus rhythm  Ectopy: unifocal PVCs  Rate: normal  BPM: 78  Conduction: conduction normal  QRS axis: normal  Other findings: non-specific ST-T wave changes    Clinical impression: abnormal EKG                  NOTE: The following portions of the patient's history were reviewed and updated this visit as appropriate: allergies, current medications, past family history, past medical history, past social history, past surgical history and problem list.

## 2023-12-18 ENCOUNTER — HOSPITAL ENCOUNTER (OUTPATIENT)
Dept: CT IMAGING | Facility: HOSPITAL | Age: 58
Discharge: HOME | End: 2023-12-18
Payer: COMMERCIAL

## 2023-12-18 ENCOUNTER — HOSPITAL ENCOUNTER (OUTPATIENT)
Dept: CARDIOLOGY | Facility: HOSPITAL | Age: 58
Discharge: HOME | End: 2023-12-18
Payer: COMMERCIAL

## 2023-12-18 VITALS
WEIGHT: 185.19 LBS | BODY MASS INDEX: 25.93 KG/M2 | DIASTOLIC BLOOD PRESSURE: 76 MMHG | SYSTOLIC BLOOD PRESSURE: 142 MMHG | HEIGHT: 71 IN

## 2023-12-18 DIAGNOSIS — I77.810 AORTIC ROOT DILATION: ICD-10-CM

## 2023-12-18 DIAGNOSIS — I35.1 NONRHEUMATIC AORTIC VALVE INSUFFICIENCY: ICD-10-CM

## 2023-12-18 DIAGNOSIS — I71.21 ANEURYSM OF ASCENDING AORTA WITHOUT RUPTURE: ICD-10-CM

## 2023-12-18 DIAGNOSIS — I35.9 AORTIC VALVE DISORDER: ICD-10-CM

## 2023-12-18 LAB
BH CV ECHO MEAS - ACS: 1.56 CM
BH CV ECHO MEAS - AI P1/2T: 495.2 MSEC
BH CV ECHO MEAS - AO MAX PG: 13.5 MMHG
BH CV ECHO MEAS - AO MEAN PG: 6.5 MMHG
BH CV ECHO MEAS - AO ROOT DIAM: 4.1 CM
BH CV ECHO MEAS - AO V2 MAX: 183.8 CM/SEC
BH CV ECHO MEAS - AO V2 VTI: 39.7 CM
BH CV ECHO MEAS - AVA(I,D): 2.5 CM2
BH CV ECHO MEAS - EDV(CUBED): 203.4 ML
BH CV ECHO MEAS - EDV(MOD-SP4): 128.3 ML
BH CV ECHO MEAS - EF(MOD-BP): 63 %
BH CV ECHO MEAS - EF(MOD-SP4): 62.7 %
BH CV ECHO MEAS - ESV(CUBED): 59.3 ML
BH CV ECHO MEAS - ESV(MOD-SP4): 47.9 ML
BH CV ECHO MEAS - FS: 33.7 %
BH CV ECHO MEAS - IVS/LVPW: 1 CM
BH CV ECHO MEAS - IVSD: 0.85 CM
BH CV ECHO MEAS - LA DIMENSION: 3 CM
BH CV ECHO MEAS - LV DIASTOLIC VOL/BSA (35-75): 62.9 CM2
BH CV ECHO MEAS - LV MASS(C)D: 193.7 GRAMS
BH CV ECHO MEAS - LV MAX PG: 4.8 MMHG
BH CV ECHO MEAS - LV MEAN PG: 2.23 MMHG
BH CV ECHO MEAS - LV SYSTOLIC VOL/BSA (12-30): 23.5 CM2
BH CV ECHO MEAS - LV V1 MAX: 109.7 CM/SEC
BH CV ECHO MEAS - LV V1 VTI: 24.5 CM
BH CV ECHO MEAS - LVIDD: 5.9 CM
BH CV ECHO MEAS - LVIDS: 3.9 CM
BH CV ECHO MEAS - LVOT AREA: 4.1 CM2
BH CV ECHO MEAS - LVOT DIAM: 2.28 CM
BH CV ECHO MEAS - LVPWD: 0.85 CM
BH CV ECHO MEAS - MR MAX PG: 67.6 MMHG
BH CV ECHO MEAS - MR MAX VEL: 411 CM/SEC
BH CV ECHO MEAS - MV A MAX VEL: 88.4 CM/SEC
BH CV ECHO MEAS - MV DEC SLOPE: 411.6 CM/SEC2
BH CV ECHO MEAS - MV DEC TIME: 0.24 SEC
BH CV ECHO MEAS - MV E MAX VEL: 98 CM/SEC
BH CV ECHO MEAS - MV E/A: 1.11
BH CV ECHO MEAS - MV MAX PG: 5 MMHG
BH CV ECHO MEAS - MV MEAN PG: 2.16 MMHG
BH CV ECHO MEAS - MV V2 VTI: 36.3 CM
BH CV ECHO MEAS - MVA(VTI): 2.8 CM2
BH CV ECHO MEAS - PA ACC TIME: 0.16 SEC
BH CV ECHO MEAS - PA V2 MAX: 80.8 CM/SEC
BH CV ECHO MEAS - PI END-D VEL: 58 CM/SEC
BH CV ECHO MEAS - PULM A REVS DUR: 0.09 SEC
BH CV ECHO MEAS - PULM A REVS VEL: 25.7 CM/SEC
BH CV ECHO MEAS - PULM DIAS VEL: 43.4 CM/SEC
BH CV ECHO MEAS - PULM S/D: 1.15
BH CV ECHO MEAS - PULM SYS VEL: 50 CM/SEC
BH CV ECHO MEAS - RAP SYSTOLE: 10 MMHG
BH CV ECHO MEAS - RVSP: 41.9 MMHG
BH CV ECHO MEAS - SI(MOD-SP4): 39.4 ML/M2
BH CV ECHO MEAS - SV(LVOT): 99.9 ML
BH CV ECHO MEAS - SV(MOD-SP4): 80.4 ML
BH CV ECHO MEAS - TAPSE (>1.6): 3.1 CM
BH CV ECHO MEAS - TR MAX PG: 31.9 MMHG
BH CV ECHO MEAS - TR MAX VEL: 280 CM/SEC
BH CV XLRA - RV BASE: 3.3 CM
BH CV XLRA - RV MID: 2.4 CM
CREAT BLDA-MCNC: 1.1 MG/DL (ref 0.6–1.3)
EGFRCR SERPLBLD CKD-EPI 2021: 78.3 ML/MIN/1.73

## 2023-12-18 PROCEDURE — 25510000001 IOPAMIDOL PER 1 ML: Performed by: INTERNAL MEDICINE

## 2023-12-18 PROCEDURE — 93306 TTE W/DOPPLER COMPLETE: CPT

## 2023-12-18 PROCEDURE — 82565 ASSAY OF CREATININE: CPT

## 2023-12-18 PROCEDURE — 74175 CTA ABDOMEN W/CONTRAST: CPT

## 2023-12-18 RX ADMIN — IOPAMIDOL 100 ML: 755 INJECTION, SOLUTION INTRAVENOUS at 13:02

## 2024-01-18 ENCOUNTER — OFFICE VISIT (OUTPATIENT)
Dept: CARDIAC SURGERY | Facility: CLINIC | Age: 59
End: 2024-01-18
Payer: COMMERCIAL

## 2024-01-18 VITALS
RESPIRATION RATE: 16 BRPM | OXYGEN SATURATION: 98 % | HEIGHT: 70 IN | WEIGHT: 204.6 LBS | HEART RATE: 96 BPM | BODY MASS INDEX: 29.29 KG/M2 | SYSTOLIC BLOOD PRESSURE: 154 MMHG | DIASTOLIC BLOOD PRESSURE: 90 MMHG | TEMPERATURE: 98.3 F

## 2024-01-18 DIAGNOSIS — I35.9 AORTIC VALVE DISORDER: ICD-10-CM

## 2024-01-18 DIAGNOSIS — I71.21 ANEURYSM OF ASCENDING AORTA WITHOUT RUPTURE: ICD-10-CM

## 2024-01-18 DIAGNOSIS — I77.810 THORACIC AORTIC ECTASIA: ICD-10-CM

## 2024-01-18 DIAGNOSIS — R01.1 HEART MURMUR: ICD-10-CM

## 2024-01-18 DIAGNOSIS — I35.1 NONRHEUMATIC AORTIC VALVE INSUFFICIENCY: ICD-10-CM

## 2024-01-18 DIAGNOSIS — E78.2 MIXED HYPERLIPIDEMIA: Primary | ICD-10-CM

## 2024-01-18 DIAGNOSIS — F41.9 ANXIETY: ICD-10-CM

## 2024-01-18 PROCEDURE — 99205 OFFICE O/P NEW HI 60 MIN: CPT | Performed by: THORACIC SURGERY (CARDIOTHORACIC VASCULAR SURGERY)

## 2024-01-18 NOTE — LETTER
January 23, 2024     Renard Mcintosh MD  [address] [place] [address] [address]  [place] [address]    Patient: Sami Quiroga   YOB: 1965   Date of Visit: 1/18/2024     Dear Renard Mcintosh MD:       Thank you for referring Sami Quiroga to me for evaluation. Below are the relevant portions of my assessment and plan of care.    If you have questions, please do not hesitate to call me. I look forward to following Sami along with you.         Sincerely,        Carlos Alex MD        CC: MD Isai Ferrara Sebastian, MD  01/23/24 1437  Sign when Signing Visit  1/23/2024    Seen on 1/18/2024    Reason for consultation:   Evaluation of ascending aortic aneurysm    Chief Complaint   Same    History of Present Illness:       Dear Jonas and Colleagues,  It was nice to see Sami Quiroga in consultation at your request. He is a 58 y.o. male with hypertension, hyperlipidemia, nocturia, anxiety and a known heart murmur who was follow-up for an ascending aortic dilatation and a heart murmur.  He reports occasional dizziness but without syncope. He denies chest or upper back pain, dyspnea, orthopnea, PND or lower extremity edema.  He had a 2D echocardiogram that showed an ejection fraction of 60%, moderate root dilatation, severe dilatation of the proximal ascending aorta and moderate aortic valve regurgitation.  The aortic valve is bicuspid.  There is mild pulm hypertension as well. His chest CT showed the ascending aorta 5 cm with an aortic root of 4.3  cm, the aorta tapers into the aortic arch with a diameter of 3 cm approximately in the descending thoracic aorta 2.5 cm with the ascending doubling the diameter of the descending.  There is no dissection or ulceration..     Patient Active Problem List   Diagnosis   • Heart murmur   • Hypertension   • Hyperlipidemia   • Nonrheumatic aortic valve insufficiency   • Thoracic aortic aneurysm without rupture   • Thoracic aortic ectasia   •  Nocturia   • Aortic valve disorder   • Aortic root dilation   • Anxiety   • Allergic rhinitis   • Nonrheumatic aortic valve insufficiency       Past Medical History:   Diagnosis Date   • Aortic insufficiency due to bicuspid aortic valve    • Ascending aortic aneurysm    • Bicuspid aortic valve    • Heart murmur    • Hyperlipidemia    • Hypertension        Past Surgical History:   Procedure Laterality Date   • APPENDECTOMY         No Known Allergies      Current Outpatient Medications:   •  atorvastatin (LIPITOR) 20 MG tablet, TAKE 1 TABLET BY MOUTH EVERYDAY AT BEDTIME, Disp: 90 tablet, Rfl: 3  •  lisinopril (PRINIVIL,ZESTRIL) 5 MG tablet, TAKE 1 TABLET BY MOUTH EVERY DAY, Disp: 90 tablet, Rfl: 3  •  metoprolol succinate XL (TOPROL-XL) 25 MG 24 hr tablet, TAKE 1 TABLET BY MOUTH EVERY DAY, Disp: 90 tablet, Rfl: 3  •  tamsulosin (FLOMAX) 0.4 MG capsule 24 hr capsule, , Disp: , Rfl:     Social History     Socioeconomic History   • Marital status:    Tobacco Use   • Smoking status: Former     Types: Cigarettes   • Smokeless tobacco: Never   Vaping Use   • Vaping Use: Never used   Substance and Sexual Activity   • Alcohol use: Yes     Comment: Rare    • Drug use: No   • Sexual activity: Defer       Family History   Problem Relation Age of Onset   • Hypertension Mother    • Hypertension Father      Review of Systems:  As HPI, otherwise noncontributory    Physical Exam:    Vital Signs:  Weight: 92.8 kg (204 lb 9.6 oz)   Body mass index is 29.36 kg/m².  Temp: 98.3 °F (36.8 °C)   Heart Rate: 96   BP: 154/90     Constitutional:       Appearance: Well-developed.   Eyes:      Conjunctiva/sclera: Conjunctivae normal.      Pupils: Pupils are equal, round, and reactive to light.   HENT:      Head: Normocephalic and atraumatic.      Nose: Nose normal.   Neck:      Thyroid: No thyromegaly.      Vascular: No JVD.      Lymphadenopathy: No cervical adenopathy.   Pulmonary:      Effort: Pulmonary effort is normal.      Breath  sounds: Normal breath sounds. No rales.   Cardiovascular:      Normal rate. Regular rhythm.      Murmurs: There is a high frequency blowing decrescendo, early diastolic murmur at the URSB, radiating to the apex.      No gallop.    Pulses:     Intact distal pulses. No decreased pulses.   Edema:     Peripheral edema absent.   Abdominal:      General: Bowel sounds are normal. There is no distension.      Palpations: Abdomen is soft. There is no abdominal mass.      Tenderness: There is no abdominal tenderness.   Musculoskeletal: Normal range of motion.         General: No tenderness or deformity.      Cervical back: Normal range of motion and neck supple. Skin:     General: Skin is warm and dry.      Findings: No erythema or rash.   Neurological:      Mental Status: Alert and oriented to person, place, and time.      Deep Tendon Reflexes: Reflexes are normal and symmetric.   Psychiatric:         Behavior: Behavior normal.         Relevant studies (other than HPI)        Assessment:     Problems Addressed this Visit          Cardiac and Vasculature    Heart murmur    Hyperlipidemia - Primary    Thoracic aortic aneurysm without rupture    Thoracic aortic ectasia    Aortic valve disorder    Nonrheumatic aortic valve insufficiency       Mental Health    Anxiety     Diagnoses         Codes Comments    Mixed hyperlipidemia    -  Primary ICD-10-CM: E78.2  ICD-9-CM: 272.2     Aneurysm of ascending aorta without rupture     ICD-10-CM: I71.21  ICD-9-CM: 441.2     Thoracic aortic ectasia     ICD-10-CM: I77.810  ICD-9-CM: 447.71     Aortic valve disorder     ICD-10-CM: I35.9  ICD-9-CM: 424.1     Anxiety     ICD-10-CM: F41.9  ICD-9-CM: 300.00     Heart murmur     ICD-10-CM: R01.1  ICD-9-CM: 785.2     Nonrheumatic aortic valve insufficiency     ICD-10-CM: I35.1  ICD-9-CM: 424.1             Assessment/recommendation:     5 cm ascending aortic aneurysm, asymptomatic, no familial history, and associated with bicuspid aortic valve.  He  has an ascending to descending aortic index of greater than 2.  I recommend ascending aortic replacement to prevent rupture or dissection in the future.  Discussed with the patient that the risk of rupture is very small but certain.  I discussed with him the natural history of aneurysm disease and the guidelines for intervention.  5 cm ascending aorta in the setting of bicuspid aortopathy send indication for surgery in the center of aortic interest and volume, like ours.  Quoted an operative mortality less than 1% risk complications less than 5%.  Moderate aortic insufficiency with a bicuspid aortic valve and aortopathy.  The aortic valve should be either repair or replace based on the findings in the operating room.  If the valve is replaced then a biologic versus mechanical prosthesis should be considered.  I explained to the patient and wife the pros and cons of mechanical and biologic prosthesis in terms of use of warfarin, durability and need for reoperation.  If a biologic prosthesis is used then he will be a bridge for TAVR in the future and probably he will have over 20 to 25 years of freedom from reoperation.  If a mechanical prosthesis is being used then the recovery operation is much less but he will have to deal with long term anticoagulation.    The patient wants to take time to think about the procedure and he understand he will need a cardiac cath with the next 2 months wishes to proceed.  Wants to be managed medically for now then he will need a CT of the chest and echocardiogram in 6 months.    Thank you for allowing me to participate in his care.    Regards,    Carlos Alex M.D.    I spent over 65 minutes before, during and after the office visit and reviewing the records, examining the patient, reviewing interpreting myself the CT scan of the chest and echocardiogram, spent time discussing the findings and options, discussing my recommendation of surgery including aortic valve and ascending  aorta replacement or repair, discussing the risk and benefits and different valve options, and I spent time in documenting in the electronic record

## 2024-01-19 DIAGNOSIS — I71.21 ANEURYSM OF ASCENDING AORTA WITHOUT RUPTURE: Primary | ICD-10-CM

## 2024-01-23 PROBLEM — I35.1 NONRHEUMATIC AORTIC VALVE INSUFFICIENCY: Status: ACTIVE | Noted: 2024-01-23

## 2024-01-23 NOTE — PROGRESS NOTES
1/23/2024    Seen on 1/18/2024    Reason for consultation:   Evaluation of ascending aortic aneurysm    Chief Complaint   Same    History of Present Illness:       Dear Jonas and Colleagues,  It was nice to see Sami Quiroga in consultation at your request. He is a 58 y.o. male with hypertension, hyperlipidemia, nocturia, anxiety and a known heart murmur who was follow-up for an ascending aortic dilatation and a heart murmur.  He reports occasional dizziness but without syncope. He denies chest or upper back pain, dyspnea, orthopnea, PND or lower extremity edema.  He had a 2D echocardiogram that showed an ejection fraction of 60%, moderate root dilatation, severe dilatation of the proximal ascending aorta and moderate aortic valve regurgitation.  The aortic valve is bicuspid.  There is mild pulm hypertension as well. His chest CT showed the ascending aorta 5 cm with an aortic root of 4.3  cm, the aorta tapers into the aortic arch with a diameter of 3 cm approximately in the descending thoracic aorta 2.5 cm with the ascending doubling the diameter of the descending.  There is no dissection or ulceration..     Patient Active Problem List   Diagnosis    Heart murmur    Hypertension    Hyperlipidemia    Nonrheumatic aortic valve insufficiency    Thoracic aortic aneurysm without rupture    Thoracic aortic ectasia    Nocturia    Aortic valve disorder    Aortic root dilation    Anxiety    Allergic rhinitis    Nonrheumatic aortic valve insufficiency       Past Medical History:   Diagnosis Date    Aortic insufficiency due to bicuspid aortic valve     Ascending aortic aneurysm     Bicuspid aortic valve     Heart murmur     Hyperlipidemia     Hypertension        Past Surgical History:   Procedure Laterality Date    APPENDECTOMY         No Known Allergies      Current Outpatient Medications:     atorvastatin (LIPITOR) 20 MG tablet, TAKE 1 TABLET BY MOUTH EVERYDAY AT BEDTIME, Disp: 90 tablet, Rfl: 3    lisinopril  (PRINIVIL,ZESTRIL) 5 MG tablet, TAKE 1 TABLET BY MOUTH EVERY DAY, Disp: 90 tablet, Rfl: 3    metoprolol succinate XL (TOPROL-XL) 25 MG 24 hr tablet, TAKE 1 TABLET BY MOUTH EVERY DAY, Disp: 90 tablet, Rfl: 3    tamsulosin (FLOMAX) 0.4 MG capsule 24 hr capsule, , Disp: , Rfl:     Social History     Socioeconomic History    Marital status:    Tobacco Use    Smoking status: Former     Types: Cigarettes    Smokeless tobacco: Never   Vaping Use    Vaping Use: Never used   Substance and Sexual Activity    Alcohol use: Yes     Comment: Rare     Drug use: No    Sexual activity: Defer       Family History   Problem Relation Age of Onset    Hypertension Mother     Hypertension Father      Review of Systems:  As HPI, otherwise noncontributory    Physical Exam:    Vital Signs:  Weight: 92.8 kg (204 lb 9.6 oz)   Body mass index is 29.36 kg/m².  Temp: 98.3 °F (36.8 °C)   Heart Rate: 96   BP: 154/90     Constitutional:       Appearance: Well-developed.   Eyes:      Conjunctiva/sclera: Conjunctivae normal.      Pupils: Pupils are equal, round, and reactive to light.   HENT:      Head: Normocephalic and atraumatic.      Nose: Nose normal.   Neck:      Thyroid: No thyromegaly.      Vascular: No JVD.      Lymphadenopathy: No cervical adenopathy.   Pulmonary:      Effort: Pulmonary effort is normal.      Breath sounds: Normal breath sounds. No rales.   Cardiovascular:      Normal rate. Regular rhythm.      Murmurs: There is a high frequency blowing decrescendo, early diastolic murmur at the URSB, radiating to the apex.      No gallop.    Pulses:     Intact distal pulses. No decreased pulses.   Edema:     Peripheral edema absent.   Abdominal:      General: Bowel sounds are normal. There is no distension.      Palpations: Abdomen is soft. There is no abdominal mass.      Tenderness: There is no abdominal tenderness.   Musculoskeletal: Normal range of motion.         General: No tenderness or deformity.      Cervical back: Normal  range of motion and neck supple. Skin:     General: Skin is warm and dry.      Findings: No erythema or rash.   Neurological:      Mental Status: Alert and oriented to person, place, and time.      Deep Tendon Reflexes: Reflexes are normal and symmetric.   Psychiatric:         Behavior: Behavior normal.         Relevant studies (other than HPI)        Assessment:     Problems Addressed this Visit          Cardiac and Vasculature    Heart murmur    Hyperlipidemia - Primary    Thoracic aortic aneurysm without rupture    Thoracic aortic ectasia    Aortic valve disorder    Nonrheumatic aortic valve insufficiency       Mental Health    Anxiety     Diagnoses         Codes Comments    Mixed hyperlipidemia    -  Primary ICD-10-CM: E78.2  ICD-9-CM: 272.2     Aneurysm of ascending aorta without rupture     ICD-10-CM: I71.21  ICD-9-CM: 441.2     Thoracic aortic ectasia     ICD-10-CM: I77.810  ICD-9-CM: 447.71     Aortic valve disorder     ICD-10-CM: I35.9  ICD-9-CM: 424.1     Anxiety     ICD-10-CM: F41.9  ICD-9-CM: 300.00     Heart murmur     ICD-10-CM: R01.1  ICD-9-CM: 785.2     Nonrheumatic aortic valve insufficiency     ICD-10-CM: I35.1  ICD-9-CM: 424.1             Assessment/recommendation:     5 cm ascending aortic aneurysm, asymptomatic, no familial history, and associated with bicuspid aortic valve.  He has an ascending to descending aortic index of greater than 2.  I recommend ascending aortic replacement to prevent rupture or dissection in the future.  Discussed with the patient that the risk of rupture is very small but certain.  I discussed with him the natural history of aneurysm disease and the guidelines for intervention.  5 cm ascending aorta in the setting of bicuspid aortopathy send indication for surgery in the center of aortic interest and volume, like ours.  Quoted an operative mortality less than 1% risk complications less than 5%.  Moderate aortic insufficiency with a bicuspid aortic valve and aortopathy.   The aortic valve should be either repair or replace based on the findings in the operating room.  If the valve is replaced then a biologic versus mechanical prosthesis should be considered.  I explained to the patient and wife the pros and cons of mechanical and biologic prosthesis in terms of use of warfarin, durability and need for reoperation.  If a biologic prosthesis is used then he will be a bridge for TAVR in the future and probably he will have over 20 to 25 years of freedom from reoperation.  If a mechanical prosthesis is being used then the recovery operation is much less but he will have to deal with long term anticoagulation.    The patient wants to take time to think about the procedure and he understand he will need a cardiac cath with the next 2 months wishes to proceed.  Wants to be managed medically for now then he will need a CT of the chest and echocardiogram in 6 months.    Thank you for allowing me to participate in his care.    Regards,    Carlos Alex M.D.    I spent over 65 minutes before, during and after the office visit and reviewing the records, examining the patient, reviewing interpreting myself the CT scan of the chest and echocardiogram, spent time discussing the findings and options, discussing my recommendation of surgery including aortic valve and ascending aorta replacement or repair, discussing the risk and benefits and different valve options, and I spent time in documenting in the electronic record

## 2024-02-17 DIAGNOSIS — E78.2 MIXED HYPERLIPIDEMIA: ICD-10-CM

## 2024-02-19 RX ORDER — ATORVASTATIN CALCIUM 20 MG/1
TABLET, FILM COATED ORAL
Qty: 90 TABLET | Refills: 0 | Status: SHIPPED | OUTPATIENT
Start: 2024-02-19

## 2024-03-22 RX ORDER — METOPROLOL SUCCINATE 25 MG/1
25 TABLET, EXTENDED RELEASE ORAL DAILY
Qty: 90 TABLET | Refills: 0 | Status: SHIPPED | OUTPATIENT
Start: 2024-03-22

## 2024-03-22 RX ORDER — LISINOPRIL 5 MG/1
5 TABLET ORAL DAILY
Qty: 90 TABLET | Refills: 0 | Status: SHIPPED | OUTPATIENT
Start: 2024-03-22

## 2024-03-28 RX ORDER — LISINOPRIL 5 MG/1
5 TABLET ORAL DAILY
Qty: 90 TABLET | Refills: 0 | Status: SHIPPED | OUTPATIENT
Start: 2024-03-28 | End: 2024-04-01 | Stop reason: SDUPTHER

## 2024-03-29 ENCOUNTER — TELEPHONE (OUTPATIENT)
Dept: CARDIOLOGY | Facility: CLINIC | Age: 59
End: 2024-03-29

## 2024-03-29 NOTE — TELEPHONE ENCOUNTER
Caller: Sami Quiroga    Relationship: Self    Best call back number: [phone]    What is the best time to reach you: ANY    Who are you requesting to speak with (clinical staff, provider,  specific staff member): CLINICAL      What was the call regarding: PATIENT STATED THAT [facility] IN [place] ADVISED THE PATIENT THAT THE LISINOPRIL 5 MG AND METOPROLO SUCCINATE XL 5 MG MEDICATIONS HAD BEEN DENIED COVERAGE BY HIS INSURANCE AND THE PRESCRIPTIONS MAY NEED TO BE SENT AS NEW PRESCRIPTIONS FOR 90 DAY SUPPLY SO THAT INSURANCE WOULD COVER THEM. PLEASE CONTACT PATIENT TO ADVISE.     Is it okay if the provider responds through MyChart: CALL

## 2024-03-29 NOTE — TELEPHONE ENCOUNTER
Called pharmacy, spoke with pharmacist. They have sent though 30 day, 90 day and 83day supplies and have been notified each time to send a different amount. Pharmacist said each med should be about $11 for a 90 day supply cash with discounts. I called patient and relayed this info and he will pay cash for now and call his insurance company

## 2024-04-01 RX ORDER — METOPROLOL SUCCINATE 25 MG/1
25 TABLET, EXTENDED RELEASE ORAL DAILY
Qty: 90 TABLET | Refills: 0 | Status: SHIPPED | OUTPATIENT
Start: 2024-04-01

## 2024-04-01 RX ORDER — LISINOPRIL 5 MG/1
5 TABLET ORAL DAILY
Qty: 90 TABLET | Refills: 0 | Status: SHIPPED | OUTPATIENT
Start: 2024-04-01

## 2024-05-08 ENCOUNTER — OFFICE VISIT (OUTPATIENT)
Dept: CARDIOLOGY | Facility: CLINIC | Age: 59
End: 2024-05-08
Payer: COMMERCIAL

## 2024-05-08 VITALS
OXYGEN SATURATION: 96 % | SYSTOLIC BLOOD PRESSURE: 127 MMHG | BODY MASS INDEX: 28.63 KG/M2 | DIASTOLIC BLOOD PRESSURE: 78 MMHG | HEART RATE: 76 BPM | HEIGHT: 70 IN | WEIGHT: 200 LBS

## 2024-05-08 DIAGNOSIS — I77.810 THORACIC AORTIC ECTASIA: ICD-10-CM

## 2024-05-08 DIAGNOSIS — I35.1 NONRHEUMATIC AORTIC VALVE INSUFFICIENCY: Primary | ICD-10-CM

## 2024-05-08 DIAGNOSIS — E78.2 MIXED HYPERLIPIDEMIA: ICD-10-CM

## 2024-05-08 DIAGNOSIS — I35.9 AORTIC VALVE DISORDER: ICD-10-CM

## 2024-05-08 DIAGNOSIS — I71.21 ANEURYSM OF ASCENDING AORTA WITHOUT RUPTURE: ICD-10-CM

## 2024-05-08 DIAGNOSIS — I10 PRIMARY HYPERTENSION: ICD-10-CM

## 2024-05-08 DIAGNOSIS — I77.810 AORTIC ROOT DILATION: ICD-10-CM

## 2024-05-08 PROCEDURE — 93000 ELECTROCARDIOGRAM COMPLETE: CPT | Performed by: INTERNAL MEDICINE

## 2024-05-08 PROCEDURE — 99214 OFFICE O/P EST MOD 30 MIN: CPT | Performed by: INTERNAL MEDICINE

## 2024-05-21 ENCOUNTER — HOSPITAL ENCOUNTER (OUTPATIENT)
Dept: CT IMAGING | Facility: HOSPITAL | Age: 59
Discharge: HOME | End: 2024-05-21
Admitting: THORACIC SURGERY (CARDIOTHORACIC VASCULAR SURGERY)
Payer: COMMERCIAL

## 2024-05-21 DIAGNOSIS — I71.21 ANEURYSM OF ASCENDING AORTA WITHOUT RUPTURE: ICD-10-CM

## 2024-05-21 LAB
CREAT BLDA-MCNC: 1.1 MG/DL (ref 0.6–1.3)
EGFRCR SERPLBLD CKD-EPI 2021: 78.3 ML/MIN/1.73

## 2024-05-21 PROCEDURE — 82565 ASSAY OF CREATININE: CPT

## 2024-05-21 PROCEDURE — 71275 CT ANGIOGRAPHY CHEST: CPT

## 2024-05-21 PROCEDURE — 25510000001 IOPAMIDOL PER 1 ML: Performed by: THORACIC SURGERY (CARDIOTHORACIC VASCULAR SURGERY)

## 2024-05-21 RX ADMIN — IOPAMIDOL 100 ML: 755 INJECTION, SOLUTION INTRAVENOUS at 09:51

## 2024-05-29 DIAGNOSIS — E78.2 MIXED HYPERLIPIDEMIA: ICD-10-CM

## 2024-05-29 RX ORDER — ATORVASTATIN CALCIUM 20 MG/1
20 TABLET, FILM COATED ORAL
Qty: 90 TABLET | Refills: 1 | Status: SHIPPED | OUTPATIENT
Start: 2024-05-29

## 2024-06-15 ENCOUNTER — LAB (OUTPATIENT)
Dept: LAB | Facility: HOSPITAL | Age: 59
End: 2024-06-15
Payer: COMMERCIAL

## 2024-06-15 DIAGNOSIS — I35.9 AORTIC VALVE DISORDER: ICD-10-CM

## 2024-06-15 DIAGNOSIS — I77.810 AORTIC ROOT DILATION: ICD-10-CM

## 2024-06-15 DIAGNOSIS — I77.810 THORACIC AORTIC ECTASIA: ICD-10-CM

## 2024-06-15 DIAGNOSIS — I71.21 ANEURYSM OF ASCENDING AORTA WITHOUT RUPTURE: ICD-10-CM

## 2024-06-15 DIAGNOSIS — I35.1 NONRHEUMATIC AORTIC VALVE INSUFFICIENCY: ICD-10-CM

## 2024-06-15 LAB
ANION GAP SERPL CALCULATED.3IONS-SCNC: 9.6 MMOL/L (ref 5–15)
BASOPHILS # BLD AUTO: 0.04 10*3/MM3 (ref 0–0.2)
BASOPHILS NFR BLD AUTO: 0.7 % (ref 0–1.5)
BUN SERPL-MCNC: 15 MG/DL (ref 6–20)
BUN/CREAT SERPL: 14 (ref 7–25)
CALCIUM SPEC-SCNC: 9.7 MG/DL (ref 8.6–10.5)
CHLORIDE SERPL-SCNC: 103 MMOL/L (ref 98–107)
CO2 SERPL-SCNC: 27.4 MMOL/L (ref 22–29)
CREAT SERPL-MCNC: 1.07 MG/DL (ref 0.76–1.27)
DEPRECATED RDW RBC AUTO: 41.4 FL (ref 37–54)
EGFRCR SERPLBLD CKD-EPI 2021: 80.9 ML/MIN/1.73
EOSINOPHIL # BLD AUTO: 0.1 10*3/MM3 (ref 0–0.4)
EOSINOPHIL NFR BLD AUTO: 1.7 % (ref 0.3–6.2)
ERYTHROCYTE [DISTWIDTH] IN BLOOD BY AUTOMATED COUNT: 12.9 % (ref 12.3–15.4)
GLUCOSE SERPL-MCNC: 138 MG/DL (ref 65–99)
HCT VFR BLD AUTO: 45.7 % (ref 37.5–51)
HGB BLD-MCNC: 14.8 G/DL (ref 13–17.7)
IMM GRANULOCYTES # BLD AUTO: 0.02 10*3/MM3 (ref 0–0.05)
IMM GRANULOCYTES NFR BLD AUTO: 0.3 % (ref 0–0.5)
INR PPP: 0.95 (ref 0.93–1.1)
LYMPHOCYTES # BLD AUTO: 1.44 10*3/MM3 (ref 0.7–3.1)
LYMPHOCYTES NFR BLD AUTO: 24.4 % (ref 19.6–45.3)
MCH RBC QN AUTO: 28.4 PG (ref 26.6–33)
MCHC RBC AUTO-ENTMCNC: 32.4 G/DL (ref 31.5–35.7)
MCV RBC AUTO: 87.7 FL (ref 79–97)
MONOCYTES # BLD AUTO: 0.5 10*3/MM3 (ref 0.1–0.9)
MONOCYTES NFR BLD AUTO: 8.5 % (ref 5–12)
NEUTROPHILS NFR BLD AUTO: 3.81 10*3/MM3 (ref 1.7–7)
NEUTROPHILS NFR BLD AUTO: 64.4 % (ref 42.7–76)
NRBC BLD AUTO-RTO: 0 /100 WBC (ref 0–0.2)
PLATELET # BLD AUTO: 160 10*3/MM3 (ref 140–450)
PMV BLD AUTO: 9.7 FL (ref 6–12)
POTASSIUM SERPL-SCNC: 4.6 MMOL/L (ref 3.5–5.2)
PROTHROMBIN TIME: 10.4 SECONDS (ref 9.6–11.7)
RBC # BLD AUTO: 5.21 10*6/MM3 (ref 4.14–5.8)
SODIUM SERPL-SCNC: 140 MMOL/L (ref 136–145)
WBC NRBC COR # BLD AUTO: 5.91 10*3/MM3 (ref 3.4–10.8)

## 2024-06-15 PROCEDURE — 36415 COLL VENOUS BLD VENIPUNCTURE: CPT

## 2024-06-15 PROCEDURE — 80048 BASIC METABOLIC PNL TOTAL CA: CPT

## 2024-06-15 PROCEDURE — 85025 COMPLETE CBC W/AUTO DIFF WBC: CPT

## 2024-06-15 PROCEDURE — 85610 PROTHROMBIN TIME: CPT

## 2024-06-17 ENCOUNTER — HOSPITAL ENCOUNTER (OUTPATIENT)
Facility: HOSPITAL | Age: 59
Setting detail: HOSPITAL OUTPATIENT SURGERY
Discharge: HOME | End: 2024-06-17
Attending: INTERNAL MEDICINE | Admitting: INTERNAL MEDICINE
Payer: COMMERCIAL

## 2024-06-17 VITALS
TEMPERATURE: 98.4 F | RESPIRATION RATE: 18 BRPM | SYSTOLIC BLOOD PRESSURE: 128 MMHG | HEIGHT: 71 IN | HEART RATE: 62 BPM | WEIGHT: 203.93 LBS | OXYGEN SATURATION: 96 % | DIASTOLIC BLOOD PRESSURE: 79 MMHG | BODY MASS INDEX: 28.55 KG/M2

## 2024-06-17 DIAGNOSIS — I35.9 AORTIC VALVE DISORDER: ICD-10-CM

## 2024-06-17 DIAGNOSIS — I77.810 THORACIC AORTIC ECTASIA: ICD-10-CM

## 2024-06-17 DIAGNOSIS — I35.1 NONRHEUMATIC AORTIC VALVE INSUFFICIENCY: ICD-10-CM

## 2024-06-17 DIAGNOSIS — I71.21 ANEURYSM OF ASCENDING AORTA WITHOUT RUPTURE: ICD-10-CM

## 2024-06-17 DIAGNOSIS — I77.810 AORTIC ROOT DILATION: ICD-10-CM

## 2024-06-17 LAB
ACT BLD: 152 SECONDS (ref 89–137)
ACT BLD: 189 SECONDS (ref 89–137)

## 2024-06-17 PROCEDURE — 25010000002 LIDOCAINE 1 % SOLUTION: Performed by: INTERNAL MEDICINE

## 2024-06-17 PROCEDURE — C1894 INTRO/SHEATH, NON-LASER: HCPCS | Performed by: INTERNAL MEDICINE

## 2024-06-17 PROCEDURE — 93454 CORONARY ARTERY ANGIO S&I: CPT | Performed by: INTERNAL MEDICINE

## 2024-06-17 PROCEDURE — 25010000002 FENTANYL CITRATE (PF) 100 MCG/2ML SOLUTION: Performed by: INTERNAL MEDICINE

## 2024-06-17 PROCEDURE — 25010000002 HEPARIN (PORCINE) PER 1000 UNITS: Performed by: INTERNAL MEDICINE

## 2024-06-17 PROCEDURE — 25010000002 MIDAZOLAM PER 1 MG: Performed by: INTERNAL MEDICINE

## 2024-06-17 PROCEDURE — 25010000002 NITROGLYCERIN 5 MG/ML SOLUTION: Performed by: INTERNAL MEDICINE

## 2024-06-17 PROCEDURE — 99153 MOD SED SAME PHYS/QHP EA: CPT | Performed by: INTERNAL MEDICINE

## 2024-06-17 PROCEDURE — S0260 H&P FOR SURGERY: HCPCS | Performed by: INTERNAL MEDICINE

## 2024-06-17 PROCEDURE — 25010000002 NICARDIPINE 2.5 MG/ML SOLUTION: Performed by: INTERNAL MEDICINE

## 2024-06-17 PROCEDURE — 85347 COAGULATION TIME ACTIVATED: CPT

## 2024-06-17 PROCEDURE — 99152 MOD SED SAME PHYS/QHP 5/>YRS: CPT | Performed by: INTERNAL MEDICINE

## 2024-06-17 PROCEDURE — C1769 GUIDE WIRE: HCPCS | Performed by: INTERNAL MEDICINE

## 2024-06-17 PROCEDURE — 25510000001 IOPAMIDOL PER 1 ML: Performed by: INTERNAL MEDICINE

## 2024-06-17 RX ORDER — ONDANSETRON 2 MG/ML
4 INJECTION INTRAMUSCULAR; INTRAVENOUS EVERY 6 HOURS PRN
Status: DISCONTINUED | OUTPATIENT
Start: 2024-06-17 | End: 2024-06-17 | Stop reason: HOSPADM

## 2024-06-17 RX ORDER — HEPARIN SODIUM 1000 [USP'U]/ML
INJECTION, SOLUTION INTRAVENOUS; SUBCUTANEOUS
Status: DISCONTINUED | OUTPATIENT
Start: 2024-06-17 | End: 2024-06-17 | Stop reason: HOSPADM

## 2024-06-17 RX ORDER — NITROGLYCERIN 5 MG/ML
INJECTION, SOLUTION INTRAVENOUS
Status: DISCONTINUED | OUTPATIENT
Start: 2024-06-17 | End: 2024-06-17 | Stop reason: HOSPADM

## 2024-06-17 RX ORDER — DIPHENHYDRAMINE HCL 25 MG
25 CAPSULE ORAL EVERY 6 HOURS PRN
Status: DISCONTINUED | OUTPATIENT
Start: 2024-06-17 | End: 2024-06-17 | Stop reason: HOSPADM

## 2024-06-17 RX ORDER — NICARDIPINE HYDROCHLORIDE 2.5 MG/ML
INJECTION INTRAVENOUS
Status: DISCONTINUED | OUTPATIENT
Start: 2024-06-17 | End: 2024-06-17 | Stop reason: HOSPADM

## 2024-06-17 RX ORDER — LIDOCAINE HYDROCHLORIDE 10 MG/ML
INJECTION, SOLUTION INFILTRATION; PERINEURAL
Status: DISCONTINUED | OUTPATIENT
Start: 2024-06-17 | End: 2024-06-17 | Stop reason: HOSPADM

## 2024-06-17 RX ORDER — ACETAMINOPHEN 325 MG/1
650 TABLET ORAL EVERY 4 HOURS PRN
Status: DISCONTINUED | OUTPATIENT
Start: 2024-06-17 | End: 2024-06-17 | Stop reason: HOSPADM

## 2024-06-17 RX ORDER — ONDANSETRON 4 MG/1
4 TABLET, ORALLY DISINTEGRATING ORAL EVERY 6 HOURS PRN
Status: DISCONTINUED | OUTPATIENT
Start: 2024-06-17 | End: 2024-06-17 | Stop reason: HOSPADM

## 2024-06-17 RX ORDER — SODIUM CHLORIDE 9 MG/ML
250 INJECTION, SOLUTION INTRAVENOUS ONCE AS NEEDED
Status: DISCONTINUED | OUTPATIENT
Start: 2024-06-17 | End: 2024-06-17 | Stop reason: HOSPADM

## 2024-06-17 RX ORDER — MIDAZOLAM HYDROCHLORIDE 1 MG/ML
INJECTION INTRAMUSCULAR; INTRAVENOUS
Status: DISCONTINUED | OUTPATIENT
Start: 2024-06-17 | End: 2024-06-17 | Stop reason: HOSPADM

## 2024-06-17 RX ORDER — NITROGLYCERIN 0.4 MG/1
0.4 TABLET SUBLINGUAL
Status: DISCONTINUED | OUTPATIENT
Start: 2024-06-17 | End: 2024-06-17 | Stop reason: HOSPADM

## 2024-06-17 RX ORDER — FENTANYL CITRATE 50 UG/ML
INJECTION, SOLUTION INTRAMUSCULAR; INTRAVENOUS
Status: DISCONTINUED | OUTPATIENT
Start: 2024-06-17 | End: 2024-06-17 | Stop reason: HOSPADM

## 2024-06-17 NOTE — DISCHARGE INSTRUCTIONS
Post Cath Instructions    Call Dr. Lopez’s office to schedule a follow up appointment in 3-4 weeks at [phone].  Specific Physician Instructions:     Drink plenty of fluids for the next 24 hours.  This helps to eliminate the dye used in your procedure through urination.  You may resume a normal diet; however, try to avoid foods that would cause gas or constipation.    Sedative medication given to you during your catheterization may decrease your judgement and reaction time for up to 24-48 hours.  Therefore:  DO NOT drive or operate hazardous machinery (48 hours)  DO NOT consume alcoholic beverages  DO NOT make any important/legal decisions  Have someone stay with you for at least 24 hours    To allow proper healing and prevent bleeding, the following activities are to be strictly avoided for the next 24-48 hours:  Excessive bending at wound site  Straining (anything that would tense up muscles around the affected puncture site)  Lifting objects greater than 5 pounds, pushing, or pulling for 5 days  For Arm Cases:  No flexing at the puncture site, such as hammering, golfing, bowling, or swinging any objects  For Groin Cases:  Refrain from sexual activity  Refrain from running or vigorous walking  No prolonged sitting or standing  Limit stair climbing as much as possible    Keep the puncture site clean and dry.  You may remove the dressing tomorrow and replace it with a band-aid for at least one additional day.  Gently clean the site with mild soap and water.  No scrubbing/rubbing and lightly pat the area dry.  Showers are acceptable; however, avoid submerging in water (tub baths, hot tubs, swimming pools, dishwater, etc…) for at least one week.  The site should be completely healed before resuming these activities to reduce the risk of infection.  Check the site often.  Watch for signs and symptoms of infection and notify your physician if any of the following occur:  Bleeding or an increase in swelling at the  puncture site  Fever  Increased soreness around puncture site  Foul odor or significant drainage from the puncture site  Swelling, redness, or warmth at the puncture site    **A bruise or small “pea sized” lump under the skin at the puncture site is not unusual.  This should disappear within 3-4 weeks.**  CONTACT YOUR PHYSICIAN OR CALL 911 IF YOU EXPERIENCE ANY OF THE FOLLOWING:  Increased angina (chest pain) or frequent sensations of pressure, burning, pain, or other discomfort in the chest, arm, jaws, or stomach  Lightheadedness, dizziness, faint feeling, sweating, or difficulty breathing  Odd sensation changes like numbness, tingling, coldness, or pain in the arm or leg in which the catheter was inserted  Limb in which the catheter was inserted becomes pale/bluish in color    IMPORTANT:  Although this occurs very rarely, if you should develop bright red or excessive bleeding, feel a “pop” inside at the insertion site, or notice a sudden increase in swelling larger than a walnut, you should call 911.  Hold continuous firm pressure to the access site until emergency personnel arrive.  It is best if someone else can do this for you.     EKG

## 2024-06-17 NOTE — H&P
[facility] CARDIOLOGY    Renard Mcintosh MD    CHIEF COMPLAINT:     Aortic regurgitation and ascending aortic aneurysm     HISTORY OF PRESENT ILLNESS:    58 y.o. male  with history of aortic valve disorder with aortic insufficiency presents to my office for followup.  Patient is currently stable without symptoms of chest pain or shortness of breath at rest or exertion.  No palpitations      Interpretation Summary     Estimated left ventricular EF = 60% Estimated left ventricular EF was in agreement with the calculated left ventricular EF. Left ventricular systolic function is normal.  Moderate dilation of the aortic root is present. Severe dilation of the proximal aorta is present. Severe dilation of the ascending aorta is present.  Moderate aortic valve regurgitation is present.  Estimated right ventricular systolic pressure from tricuspid regurgitation is mildly elevated (35-45 mmHg).  Mild pulmonary hypertension is present.  Consider CT chest to further evaluate the ascending aortic aneurysm         Note from surgical team  5 cm ascending aortic aneurysm, asymptomatic, no familial history, and associated with bicuspid aortic valve.  He has an ascending to descending aortic index of greater than 2.  I recommend ascending aortic replacement to prevent rupture or dissection in the future.  Discussed with the patient that the risk of rupture is very small but certain.  I discussed with him the natural history of aneurysm disease and the guidelines for intervention.  5 cm ascending aorta in the setting of bicuspid aortopathy send indication for surgery in the center of aortic interest and volume, like ours.  Quoted an operative mortality less than 1% risk complications less than 5%.  Moderate aortic insufficiency with a bicuspid aortic valve and aortopathy.  The aortic valve should be either repair or replace based on the findings in the operating room.  If the valve is replaced then a  biologic versus mechanical prosthesis should be considered.  I explained to the patient and wife the pros and cons of mechanical and biologic prosthesis in terms of use of warfarin, durability and need for reoperation.  If a biologic prosthesis is used then he will be a bridge for TAVR in the future and probably he will have over 20 to 25 years of freedom from reoperation.  If a mechanical prosthesis is being used then the recovery operation is much less but he will have to deal with long term anticoagulation.     The patient wants to take time to think about the procedure and he understand he will need a cardiac cath with the next 2 months wishes to proceed.  Wants to be managed medically for now then he will need a CT of the chest and echocardiogram in 6 months.           Interval History:  Denies any symptoms of chest pain shortness of breath dizziness or syncope  Denies any new cardiac symptoms  No significant change in the functional status  Compliant with medical therapy  Assessment & Plan     Impressions:  Aortic regurgitation, moderate  Bicuspid aortic valve  bicuspid aortic valve is suggested with fused non-coronary cusps  Ascending aortic aneurysm  Hypertension  Moderate aortic insufficiency  CT angiogram of the aorta shows aortic root size of 4.3 cm and ascending aorta of 4.8 cm        Recommendations:  Goal systolic blood pressure less than 130  goal LDL less than 70  Continue home blood pressure monitoring  Findings of the CT chest and prior echocardiogram reviewed and discussed with the patient  Repeat echocardiogram with only mild to moderate aortic regurgitation and no significant change in the size of the ascending aortic aneurysm  Recent labs reviewed and discussed with the patient  Referred to cardiothoracic surgery for further evaluation and treatment options  Repeat CT chest to further assess the aortic aneurysm  Home blood pressure readings are optimal  Repeat CT chest in December 2023 to  assess the aortic root and ascending aortic aneurysm  Echocardiogram to reassess the severity of the aortic regurgitation  Prior work-up reviewed and discussed with the patient  Prior labs and work-up reviewed and discussed with the patient  Continue current medical therapy with Toprol-XL 25 mg p.o. once a day lisinopril 5 mg p.o. once a day Lipitor 20 mg p.o. once a day  Patient was seen and evaluated by surgical team was recommended to consider surgical repair of the ascending aortic aneurysm and possible repair versus replacement of the aortic valve  Patient is considering surgery during the summer  Patient is agreeable to proceed with cardiac catheterization in general and follow-up with surgical team and consider surgical repair of the ascending aortic aneurysm and aortic regurgitation  Schedule for cardiac catheterization  Risk benefits and alternatives reviewed and discussed with the patient      Past Medical History:   Diagnosis Date    Aortic insufficiency due to bicuspid aortic valve     Ascending aortic aneurysm     Bicuspid aortic valve     Heart murmur     Hyperlipidemia     Hypertension      Past Surgical History:   Procedure Laterality Date    APPENDECTOMY       Family History   Problem Relation Age of Onset    Hypertension Mother     Hypertension Father      Social History     Tobacco Use    Smoking status: Former     Types: Cigarettes    Smokeless tobacco: Never   Vaping Use    Vaping status: Never Used   Substance Use Topics    Alcohol use: Yes     Comment: Rare     Drug use: No     Medications Prior to Admission   Medication Sig Dispense Refill Last Dose    atorvastatin (LIPITOR) 20 MG tablet TAKE 1 TABLET BY MOUTH EVERY NIGHT AT BEDTIME 90 tablet 1 6/16/2024 at 1800    lisinopril (PRINIVIL,ZESTRIL) 5 MG tablet Take 1 tablet by mouth Daily. 90 tablet 0 6/16/2024 at 1800    metoprolol succinate XL (TOPROL-XL) 25 MG 24 hr tablet Take 1 tablet by mouth Daily. 90 tablet 0 6/16/2024 at 1800     "tamsulosin (FLOMAX) 0.4 MG capsule 24 hr capsule Take 1 capsule by mouth Daily.   6/16/2024 at 0900     Allergies:  Patient has no known allergies.    Immunization History   Administered Date(s) Administered    COVID-19 (PFIZER) Purple Cap Monovalent 01/13/2021, 02/03/2021, 09/28/2021           REVIEW OF SYSTEMS:   Review of Systems   Constitutional: Negative for chills, decreased appetite and malaise/fatigue.   HENT:  Negative for congestion and nosebleeds.    Eyes:  Negative for blurred vision and double vision.   Cardiovascular:  Negative for chest pain, dyspnea on exertion, irregular heartbeat, leg swelling, near-syncope, orthopnea and palpitations.   Respiratory:  Negative for cough and shortness of breath.    Hematologic/Lymphatic: Negative for adenopathy. Does not bruise/bleed easily.   Skin:  Negative for color change and rash.   Musculoskeletal:  Negative for back pain and joint pain.   Gastrointestinal:  Negative for bloating, abdominal pain, hematemesis and hematochezia.   Genitourinary:  Negative for flank pain and hematuria.   Neurological:  Negative for dizziness and focal weakness.   Psychiatric/Behavioral:  Negative for altered mental status. The patient does not have insomnia.        Medication Review:  Scheduled Meds:  Continuous Infusions:No current facility-administered medications for this encounter.    PRN Meds:.    Vital Signs  Visit Vitals  /96 (BP Location: Right arm, Patient Position: Sitting)   Pulse 78   Temp 98.4 °F (36.9 °C) (Oral)   Resp 16   Ht 179.1 cm (70.5\")   Wt 92.5 kg (203 lb 14.8 oz)   SpO2 100%   BMI 28.85 kg/m²       Flowsheet Rows      Flowsheet Row First Filed Value   Admission Height 179.1 cm (70.5\") Documented at 06/17/2024 0638   Admission Weight 92.5 kg (203 lb 14.8 oz) Documented at 06/17/2024 0638             Physical Exam:  Constitutional:       Appearance: Well-developed.   Eyes:      Conjunctiva/sclera: Conjunctivae normal.      Pupils: Pupils are equal, " round, and reactive to light.   HENT:      Head: Normocephalic and atraumatic.   Neck:      Thyroid: No thyromegaly.   Pulmonary:      Effort: Pulmonary effort is normal.      Breath sounds: Normal breath sounds.   Cardiovascular:      Normal rate. Regular rhythm.   Pulses:     Intact distal pulses.   Edema:     Peripheral edema absent.   Abdominal:      General: Bowel sounds are normal.      Palpations: Abdomen is soft.   Musculoskeletal:      Cervical back: Normal range of motion and neck supple. Skin:     General: Skin is warm.   Neurological:      Mental Status: Alert and oriented to person, place, and time.         Results Review:    I reviewed the patient's new clinical results.  Lab Results (most recent)       None            Imaging Results (Most Recent)       None          reviewed    ECG/EMG Results (most recent)       None          reviewed        Assessment & Plan       Nonrheumatic aortic valve insufficiency    Thoracic aortic aneurysm without rupture    Thoracic aortic ectasia    Aortic valve disorder    Aortic root dilation          Brittanie Lopez MD  06/17/24  07:50 EDT

## 2024-06-17 NOTE — Clinical Note
A 6 fr sheath was successfully inserted using micropuncture technique with ultrasound guidance into the right radial artery.

## 2024-06-20 ENCOUNTER — OFFICE VISIT (OUTPATIENT)
Dept: CARDIAC SURGERY | Facility: CLINIC | Age: 59
End: 2024-06-20
Payer: COMMERCIAL

## 2024-06-20 VITALS
SYSTOLIC BLOOD PRESSURE: 139 MMHG | RESPIRATION RATE: 20 BRPM | TEMPERATURE: 97.8 F | BODY MASS INDEX: 28 KG/M2 | HEART RATE: 81 BPM | WEIGHT: 200 LBS | HEIGHT: 71 IN | DIASTOLIC BLOOD PRESSURE: 86 MMHG | OXYGEN SATURATION: 98 %

## 2024-06-20 DIAGNOSIS — I10 PRIMARY HYPERTENSION: Primary | ICD-10-CM

## 2024-06-20 DIAGNOSIS — I35.1 NONRHEUMATIC AORTIC VALVE INSUFFICIENCY: ICD-10-CM

## 2024-06-20 DIAGNOSIS — I71.21 ANEURYSM OF ASCENDING AORTA WITHOUT RUPTURE: ICD-10-CM

## 2024-06-20 DIAGNOSIS — R01.1 HEART MURMUR: ICD-10-CM

## 2024-06-20 PROCEDURE — 99214 OFFICE O/P EST MOD 30 MIN: CPT | Performed by: THORACIC SURGERY (CARDIOTHORACIC VASCULAR SURGERY)

## 2024-06-20 NOTE — LETTER
June 24, 2024     Renard Mcintosh MD  [address] [place] [address] Dr Brigette Zamora [place] [id number]    Patient: Sami Quiroga   YOB: 1965   Date of Visit: 6/20/2024     Dear Renard Mcintosh MD:       Thank you for referring Sami Quiroga to me for evaluation. Below are the relevant portions of my assessment and plan of care.    If you have questions, please do not hesitate to call me. I look forward to following Sami along with you.         Sincerely,        Carlos Alex MD        CC: MD Isai Ferrara Sebastian, MD  06/24/24 0659  Sign when Signing Visit  6/24/2024    Seen on 6/20/2024    Chief Complaint:     Follow-up evaluation of ascending aortic aneurysm    History of Present Illness:       Dear Pen and Colleagues,  It was nice to see Sami Quiroga in follow up evaluation for his paroxysmal thoracic aortic aneurysm. He is a 58 y.o. male with hypertension, hyperlipidemia and bicuspid aortopathy who I saw him last in January of this year for his ascending aortic aneurysm. He denies any new symptoms or signs aneurysm related in the interval.. His repeat chest CT showed the ascending aorta 5 cm (in my measurements) and the descending thoracic aorta 2.5 cm with a tapering to the aortic arch to approximate 3 cm.  He had a cardiac cath that showed a normal ejection fraction and no significant coronary artery disease.  He had a repeat echocardiogram that showed aortic valve with mild to moderate aortic regurgitation and a bicuspid aortic valve with minimal stenosis.  He has no family history of aneurysms, dissections or connective tissue disorders.  He is here to discuss possible surgery.    Patient Active Problem List   Diagnosis   • Heart murmur   • Hypertension   • Hyperlipidemia   • Nonrheumatic aortic valve insufficiency   • Thoracic aortic aneurysm without rupture   • Thoracic aortic ectasia   • Nocturia   • Aortic valve disorder   • Aortic root dilation   • Anxiety   •  Allergic rhinitis   • Nonrheumatic aortic valve insufficiency       Past Medical History:   Diagnosis Date   • Aortic insufficiency due to bicuspid aortic valve    • Ascending aortic aneurysm    • Bicuspid aortic valve    • Heart murmur    • Hyperlipidemia    • Hypertension        Past Surgical History:   Procedure Laterality Date   • APPENDECTOMY     • CARDIAC CATHETERIZATION Right 6/17/2024    Procedure: Left Heart Cath, RADIAL;  Surgeon: Brittanie Lopez MD;  Location: [facility] CATH INVASIVE LOCATION;  Service: Cardiovascular;  Laterality: Right;  cath in June of 2024       No Known Allergies      Current Outpatient Medications:   •  atorvastatin (LIPITOR) 20 MG tablet, TAKE 1 TABLET BY MOUTH EVERY NIGHT AT BEDTIME, Disp: 90 tablet, Rfl: 1  •  lisinopril (PRINIVIL,ZESTRIL) 5 MG tablet, Take 1 tablet by mouth Daily., Disp: 90 tablet, Rfl: 0  •  metoprolol succinate XL (TOPROL-XL) 25 MG 24 hr tablet, Take 1 tablet by mouth Daily., Disp: 90 tablet, Rfl: 0  •  tamsulosin (FLOMAX) 0.4 MG capsule 24 hr capsule, Take 1 capsule by mouth Daily., Disp: , Rfl:     Social History     Socioeconomic History   • Marital status:    Tobacco Use   • Smoking status: Former     Types: Cigarettes   • Smokeless tobacco: Never   Vaping Use   • Vaping status: Never Used   Substance and Sexual Activity   • Alcohol use: Yes     Comment: Rare    • Drug use: No   • Sexual activity: Defer       Family History   Problem Relation Age of Onset   • Hypertension Mother    • Hypertension Father      Review of Systems:  As HPI, otherwise noncontributory    Physical Exam:    Vital Signs:  Weight: 90.7 kg (200 lb)   Body mass index is 27.89 kg/m².  Temp: 97.8 °F (36.6 °C)   Heart Rate: 81   BP: 139/86     Constitutional:       Appearance: Well-developed.   Eyes:      Conjunctiva/sclera: Conjunctivae normal.      Pupils: Pupils are equal, round, and reactive to light.   HENT:      Head: Normocephalic and atraumatic.      Nose: Nose normal.    Neck:      Thyroid: No thyromegaly.      Vascular: No JVD.      Lymphadenopathy: No cervical adenopathy.   Pulmonary:      Effort: Pulmonary effort is normal.      Breath sounds: Normal breath sounds. No rales.   Cardiovascular:      Normal rate. Regular rhythm.      Murmurs: There is a grade 2/4 high frequency blowing decrescendo, early diastolic murmur at the URSB, radiating to the apex.      No gallop.    Pulses:     Intact distal pulses. No decreased pulses.   Edema:     Peripheral edema absent.   Abdominal:      General: Bowel sounds are normal. There is no distension.      Palpations: Abdomen is soft. There is no abdominal mass.      Tenderness: There is no abdominal tenderness.   Musculoskeletal: Normal range of motion.         General: No tenderness or deformity.      Cervical back: Normal range of motion and neck supple. Skin:     General: Skin is warm and dry.      Findings: No erythema or rash.   Neurological:      Mental Status: Alert and oriented to person, place, and time.      Deep Tendon Reflexes: Reflexes are normal and symmetric.   Psychiatric:         Behavior: Behavior normal.          Assessment:     Problems Addressed this Visit          Cardiac and Vasculature    Heart murmur    Hypertension - Primary    Nonrheumatic aortic valve insufficiency    Thoracic aortic aneurysm without rupture     Diagnoses         Codes Comments    Primary hypertension    -  Primary ICD-10-CM: I10  ICD-9-CM: 401.9     Heart murmur     ICD-10-CM: R01.1  ICD-9-CM: 785.2     Nonrheumatic aortic valve insufficiency     ICD-10-CM: I35.1  ICD-9-CM: 424.1     Aneurysm of ascending aorta without rupture     ICD-10-CM: I71.21  ICD-9-CM: 441.2             Assessment/recommendation:     Bicuspid aortopathy with ascending aorta 5 cm and an ascending to descending thoracic aortic index over 2.  I had discussed with the patient the natural history of aneurysm disease and the guidelines for intervention specifically in aortic  bicuspid aortopathy.  He has a bicuspid aortic valve with close to moderate regurgitation.  I discussed with him the surgical recommendation of surgery versus more frequent follow-up.  I recommend ascending aortic replacement with possible aortic valve repair or replacement and proximal arch repair based on the OR findings.  Also left atrial appendage clipping to prevent anticoagulation after surgery.  I have discussed the risks (STS calculated for AVR), benefits and terms of surgery and he wishes to proceed.  We have discussed the option of biologic versus mechanical valve and life management with anticoagulation with a possible TAVR post replacement if need be.  I am not sure whether the valve will need to be replaced.  Hypertension, well-controlled continue same regimen    Thank you for allowing me to participate in his care.    Regards,    Carlos Alex M.D.    I spent over 35 minutes before, during and after the office visit and reviewing the record, examining the patient, reviewing interpreting myself the cardiac cath, echocardiogram repeat chest CTA, spent time discussing the findings and options the recommendation of surgery, I discussed importance of blood pressure control and spent time in discussing the case with the cardiology team and documenting in the electronic record

## 2024-06-24 NOTE — PROGRESS NOTES
6/24/2024    Seen on 6/20/2024    Chief Complaint:     Follow-up evaluation of ascending aortic aneurysm    History of Present Illness:       Dear Pen and Colleagues,  It was nice to see Sami Quiroga in follow up evaluation for his paroxysmal thoracic aortic aneurysm. He is a 58 y.o. male with hypertension, hyperlipidemia and bicuspid aortopathy who I saw him last in January of this year for his ascending aortic aneurysm. He denies any new symptoms or signs aneurysm related in the interval.. His repeat chest CT showed the ascending aorta 5 cm (in my measurements) and the descending thoracic aorta 2.5 cm with a tapering to the aortic arch to approximate 3 cm.  He had a cardiac cath that showed a normal ejection fraction and no significant coronary artery disease.  He had a repeat echocardiogram that showed aortic valve with mild to moderate aortic regurgitation and a bicuspid aortic valve with minimal stenosis.  He has no family history of aneurysms, dissections or connective tissue disorders.  He is here to discuss possible surgery.    Patient Active Problem List   Diagnosis    Heart murmur    Hypertension    Hyperlipidemia    Nonrheumatic aortic valve insufficiency    Thoracic aortic aneurysm without rupture    Thoracic aortic ectasia    Nocturia    Aortic valve disorder    Aortic root dilation    Anxiety    Allergic rhinitis    Nonrheumatic aortic valve insufficiency       Past Medical History:   Diagnosis Date    Aortic insufficiency due to bicuspid aortic valve     Ascending aortic aneurysm     Bicuspid aortic valve     Heart murmur     Hyperlipidemia     Hypertension        Past Surgical History:   Procedure Laterality Date    APPENDECTOMY      CARDIAC CATHETERIZATION Right 6/17/2024    Procedure: Left Heart Cath, RADIAL;  Surgeon: Brittanie Lopez MD;  Location: [facility] CATH INVASIVE LOCATION;  Service: Cardiovascular;  Laterality: Right;  cath in June of 2024       No Known Allergies      Current  Outpatient Medications:     atorvastatin (LIPITOR) 20 MG tablet, TAKE 1 TABLET BY MOUTH EVERY NIGHT AT BEDTIME, Disp: 90 tablet, Rfl: 1    lisinopril (PRINIVIL,ZESTRIL) 5 MG tablet, Take 1 tablet by mouth Daily., Disp: 90 tablet, Rfl: 0    metoprolol succinate XL (TOPROL-XL) 25 MG 24 hr tablet, Take 1 tablet by mouth Daily., Disp: 90 tablet, Rfl: 0    tamsulosin (FLOMAX) 0.4 MG capsule 24 hr capsule, Take 1 capsule by mouth Daily., Disp: , Rfl:     Social History     Socioeconomic History    Marital status:    Tobacco Use    Smoking status: Former     Types: Cigarettes    Smokeless tobacco: Never   Vaping Use    Vaping status: Never Used   Substance and Sexual Activity    Alcohol use: Yes     Comment: Rare     Drug use: No    Sexual activity: Defer       Family History   Problem Relation Age of Onset    Hypertension Mother     Hypertension Father      Review of Systems:  As HPI, otherwise noncontributory    Physical Exam:    Vital Signs:  Weight: 90.7 kg (200 lb)   Body mass index is 27.89 kg/m².  Temp: 97.8 °F (36.6 °C)   Heart Rate: 81   BP: 139/86     Constitutional:       Appearance: Well-developed.   Eyes:      Conjunctiva/sclera: Conjunctivae normal.      Pupils: Pupils are equal, round, and reactive to light.   HENT:      Head: Normocephalic and atraumatic.      Nose: Nose normal.   Neck:      Thyroid: No thyromegaly.      Vascular: No JVD.      Lymphadenopathy: No cervical adenopathy.   Pulmonary:      Effort: Pulmonary effort is normal.      Breath sounds: Normal breath sounds. No rales.   Cardiovascular:      Normal rate. Regular rhythm.      Murmurs: There is a grade 2/4 high frequency blowing decrescendo, early diastolic murmur at the URSB, radiating to the apex.      No gallop.    Pulses:     Intact distal pulses. No decreased pulses.   Edema:     Peripheral edema absent.   Abdominal:      General: Bowel sounds are normal. There is no distension.      Palpations: Abdomen is soft. There is no  abdominal mass.      Tenderness: There is no abdominal tenderness.   Musculoskeletal: Normal range of motion.         General: No tenderness or deformity.      Cervical back: Normal range of motion and neck supple. Skin:     General: Skin is warm and dry.      Findings: No erythema or rash.   Neurological:      Mental Status: Alert and oriented to person, place, and time.      Deep Tendon Reflexes: Reflexes are normal and symmetric.   Psychiatric:         Behavior: Behavior normal.          Assessment:     Problems Addressed this Visit          Cardiac and Vasculature    Heart murmur    Hypertension - Primary    Nonrheumatic aortic valve insufficiency    Thoracic aortic aneurysm without rupture     Diagnoses         Codes Comments    Primary hypertension    -  Primary ICD-10-CM: I10  ICD-9-CM: 401.9     Heart murmur     ICD-10-CM: R01.1  ICD-9-CM: 785.2     Nonrheumatic aortic valve insufficiency     ICD-10-CM: I35.1  ICD-9-CM: 424.1     Aneurysm of ascending aorta without rupture     ICD-10-CM: I71.21  ICD-9-CM: 441.2             Assessment/recommendation:     Bicuspid aortopathy with ascending aorta 5 cm and an ascending to descending thoracic aortic index over 2.  I had discussed with the patient the natural history of aneurysm disease and the guidelines for intervention specifically in aortic bicuspid aortopathy.  He has a bicuspid aortic valve with close to moderate regurgitation.  I discussed with him the surgical recommendation of surgery versus more frequent follow-up.  I recommend ascending aortic replacement with possible aortic valve repair or replacement and proximal arch repair based on the OR findings.  Also left atrial appendage clipping to prevent anticoagulation after surgery.  I have discussed the risks (STS calculated for AVR), benefits and terms of surgery and he wishes to proceed.  We have discussed the option of biologic versus mechanical valve and life management with anticoagulation with a  possible TAVR post replacement if need be.  I am not sure whether the valve will need to be replaced.  Hypertension, well-controlled continue same regimen    Thank you for allowing me to participate in his care.    Regards,    Carlos Alex M.D.    I spent over 35 minutes before, during and after the office visit and reviewing the record, examining the patient, reviewing interpreting myself the cardiac cath, echocardiogram repeat chest CTA, spent time discussing the findings and options the recommendation of surgery, I discussed importance of blood pressure control and spent time in discussing the case with the cardiology team and documenting in the electronic record

## 2024-06-26 ENCOUNTER — TELEPHONE (OUTPATIENT)
Dept: CARDIAC SURGERY | Facility: CLINIC | Age: 59
End: 2024-06-26
Payer: COMMERCIAL

## 2024-06-28 RX ORDER — LISINOPRIL 5 MG/1
5 TABLET ORAL DAILY
Qty: 90 TABLET | Refills: 0 | Status: SHIPPED | OUTPATIENT
Start: 2024-06-28

## 2024-06-28 RX ORDER — METOPROLOL SUCCINATE 25 MG/1
25 TABLET, EXTENDED RELEASE ORAL DAILY
Qty: 90 TABLET | Refills: 0 | Status: SHIPPED | OUTPATIENT
Start: 2024-06-28

## 2024-07-03 RX ORDER — METOPROLOL SUCCINATE 25 MG/1
25 TABLET, EXTENDED RELEASE ORAL DAILY
Qty: 90 TABLET | Refills: 0 | OUTPATIENT
Start: 2024-07-03

## 2024-07-03 RX ORDER — LISINOPRIL 5 MG/1
5 TABLET ORAL DAILY
Qty: 90 TABLET | Refills: 0 | OUTPATIENT
Start: 2024-07-03

## 2024-07-18 ENCOUNTER — PREP FOR SURGERY (OUTPATIENT)
Dept: OTHER | Facility: HOSPITAL | Age: 59
End: 2024-07-18
Payer: COMMERCIAL

## 2024-07-18 DIAGNOSIS — I71.21 ANEURYSM OF ASCENDING AORTA WITHOUT RUPTURE: ICD-10-CM

## 2024-07-18 DIAGNOSIS — I35.1 NONRHEUMATIC AORTIC VALVE INSUFFICIENCY: Primary | ICD-10-CM

## 2024-07-18 RX ORDER — CHLORHEXIDINE GLUCONATE 500 MG/1
CLOTH TOPICAL EVERY 12 HOURS PRN
OUTPATIENT
Start: 2024-07-18

## 2024-07-18 RX ORDER — CHLORHEXIDINE GLUCONATE ORAL RINSE 1.2 MG/ML
15 SOLUTION DENTAL EVERY 12 HOURS
OUTPATIENT
Start: 2024-07-18 | End: 2024-07-19

## 2024-07-18 RX ORDER — IBUPROFEN 600 MG/1
1 TABLET ORAL
OUTPATIENT
Start: 2024-07-18

## 2024-07-18 RX ORDER — SODIUM CHLORIDE 0.9 % (FLUSH) 0.9 %
30 SYRINGE (ML) INJECTION ONCE AS NEEDED
OUTPATIENT
Start: 2024-07-18

## 2024-07-18 RX ORDER — DEXTROSE MONOHYDRATE 25 G/50ML
10-50 INJECTION, SOLUTION INTRAVENOUS
OUTPATIENT
Start: 2024-07-18

## 2024-07-18 RX ORDER — SODIUM CHLORIDE 9 MG/ML
40 INJECTION, SOLUTION INTRAVENOUS AS NEEDED
OUTPATIENT
Start: 2024-07-18

## 2024-07-18 RX ORDER — CHLORHEXIDINE GLUCONATE ORAL RINSE 1.2 MG/ML
15 SOLUTION DENTAL ONCE
OUTPATIENT
Start: 2024-07-18 | End: 2024-07-18

## 2024-07-18 RX ORDER — SODIUM CHLORIDE 0.9 % (FLUSH) 0.9 %
3-10 SYRINGE (ML) INJECTION AS NEEDED
OUTPATIENT
Start: 2024-07-18

## 2024-07-18 RX ORDER — ACETAMINOPHEN 325 MG/1
650 TABLET ORAL EVERY 4 HOURS PRN
OUTPATIENT
Start: 2024-07-18

## 2024-07-18 RX ORDER — SODIUM CHLORIDE 9 MG/ML
30 INJECTION, SOLUTION INTRAVENOUS CONTINUOUS PRN
OUTPATIENT
Start: 2024-07-18

## 2024-07-18 RX ORDER — ALPRAZOLAM 0.25 MG/1
0.25 TABLET ORAL ONCE
OUTPATIENT
Start: 2024-07-18 | End: 2024-07-18

## 2024-07-18 RX ORDER — NITROGLYCERIN 0.4 MG/1
0.4 TABLET SUBLINGUAL
OUTPATIENT
Start: 2024-07-18

## 2024-07-18 RX ORDER — NICOTINE POLACRILEX 4 MG
15 LOZENGE BUCCAL
OUTPATIENT
Start: 2024-07-18

## 2024-07-18 RX ORDER — SODIUM CHLORIDE 0.9 % (FLUSH) 0.9 %
3 SYRINGE (ML) INJECTION EVERY 12 HOURS SCHEDULED
OUTPATIENT
Start: 2024-07-18

## 2024-08-15 ENCOUNTER — TELEPHONE (OUTPATIENT)
Dept: CARDIAC SURGERY | Facility: CLINIC | Age: 59
End: 2024-08-15
Payer: COMMERCIAL

## 2024-08-15 NOTE — TELEPHONE ENCOUNTER
Patient returned call. Confirmed his surgery time is 0730 on 8- and arrival time is 0500. Alejandro has reached out to him with PAT times. Expressed verbal understanding.

## 2024-08-21 ENCOUNTER — HOSPITAL ENCOUNTER (OUTPATIENT)
Dept: CARDIOLOGY | Facility: HOSPITAL | Age: 59
Discharge: HOME | End: 2024-08-21
Payer: COMMERCIAL

## 2024-08-21 ENCOUNTER — HOSPITAL ENCOUNTER (OUTPATIENT)
Dept: RESPIRATORY THERAPY | Facility: HOSPITAL | Age: 59
Discharge: HOME | End: 2024-08-21
Payer: COMMERCIAL

## 2024-08-21 ENCOUNTER — PRE-ADMISSION TESTING (OUTPATIENT)
Dept: PREADMISSION TESTING | Facility: HOSPITAL | Age: 59
DRG: 220 | End: 2024-08-21
Payer: COMMERCIAL

## 2024-08-21 ENCOUNTER — LAB (OUTPATIENT)
Dept: LAB | Facility: HOSPITAL | Age: 59
End: 2024-08-21
Payer: COMMERCIAL

## 2024-08-21 ENCOUNTER — HOSPITAL ENCOUNTER (OUTPATIENT)
Dept: GENERAL RADIOLOGY | Facility: HOSPITAL | Age: 59
Discharge: HOME | End: 2024-08-21
Payer: COMMERCIAL

## 2024-08-21 ENCOUNTER — ANESTHESIA EVENT (OUTPATIENT)
Dept: PERIOP | Facility: HOSPITAL | Age: 59
End: 2024-08-21
Payer: COMMERCIAL

## 2024-08-21 VITALS
DIASTOLIC BLOOD PRESSURE: 81 MMHG | BODY MASS INDEX: 27.83 KG/M2 | WEIGHT: 198.8 LBS | HEART RATE: 70 BPM | RESPIRATION RATE: 16 BRPM | SYSTOLIC BLOOD PRESSURE: 135 MMHG | OXYGEN SATURATION: 99 % | TEMPERATURE: 98 F | HEIGHT: 71 IN

## 2024-08-21 VITALS — RESPIRATION RATE: 16 BRPM | OXYGEN SATURATION: 97 % | HEART RATE: 71 BPM

## 2024-08-21 DIAGNOSIS — I71.21 ANEURYSM OF ASCENDING AORTA WITHOUT RUPTURE: ICD-10-CM

## 2024-08-21 DIAGNOSIS — I35.1 NONRHEUMATIC AORTIC VALVE INSUFFICIENCY: ICD-10-CM

## 2024-08-21 LAB
ABO GROUP BLD: NORMAL
ALBUMIN SERPL-MCNC: 4.8 G/DL (ref 3.5–5.2)
ALBUMIN/GLOB SERPL: 1.7 G/DL
ALP SERPL-CCNC: 83 U/L (ref 39–117)
ALT SERPL W P-5'-P-CCNC: 42 U/L (ref 1–41)
ANION GAP SERPL CALCULATED.3IONS-SCNC: 12.4 MMOL/L (ref 5–15)
APTT PPP: 25 SECONDS (ref 24–31)
ARTERIAL PATENCY WRIST A: POSITIVE
AST SERPL-CCNC: 32 U/L (ref 1–40)
ATMOSPHERIC PRESS: ABNORMAL MM[HG]
BACTERIA UR QL AUTO: NORMAL /HPF
BASE EXCESS BLDA CALC-SCNC: -0.2 MMOL/L (ref 0–3)
BASOPHILS # BLD AUTO: 0.03 10*3/MM3 (ref 0–0.2)
BASOPHILS NFR BLD AUTO: 0.5 % (ref 0–1.5)
BDY SITE: ABNORMAL
BH CV XLRA MEAS LEFT DIST CCA EDV: 19.6 CM/SEC
BH CV XLRA MEAS LEFT DIST CCA PSV: 82.3 CM/SEC
BH CV XLRA MEAS LEFT DIST ICA EDV: -22 CM/SEC
BH CV XLRA MEAS LEFT DIST ICA PSV: -59.6 CM/SEC
BH CV XLRA MEAS LEFT ICA/CCA RATIO: -0.64
BH CV XLRA MEAS LEFT PROX CCA EDV: 19.6 CM/SEC
BH CV XLRA MEAS LEFT PROX CCA PSV: 111 CM/SEC
BH CV XLRA MEAS LEFT PROX ECA PSV: -81.5 CM/SEC
BH CV XLRA MEAS LEFT PROX ICA EDV: -22 CM/SEC
BH CV XLRA MEAS LEFT PROX ICA PSV: -71.3 CM/SEC
BH CV XLRA MEAS LEFT PROX SCLA PSV: 142 CM/SEC
BH CV XLRA MEAS LEFT VERTEBRAL A EDV: -14.1 CM/SEC
BH CV XLRA MEAS LEFT VERTEBRAL A PSV: -45.5 CM/SEC
BH CV XLRA MEAS RIGHT DIST CCA EDV: 15.5 CM/SEC
BH CV XLRA MEAS RIGHT DIST CCA PSV: 83.2 CM/SEC
BH CV XLRA MEAS RIGHT DIST ICA EDV: -16.2 CM/SEC
BH CV XLRA MEAS RIGHT DIST ICA PSV: -61.5 CM/SEC
BH CV XLRA MEAS RIGHT ICA/CCA RATIO: -0.82
BH CV XLRA MEAS RIGHT PROX CCA EDV: 20.5 CM/SEC
BH CV XLRA MEAS RIGHT PROX CCA PSV: 87 CM/SEC
BH CV XLRA MEAS RIGHT PROX ECA PSV: -104 CM/SEC
BH CV XLRA MEAS RIGHT PROX ICA EDV: -16.2 CM/SEC
BH CV XLRA MEAS RIGHT PROX ICA PSV: -71.4 CM/SEC
BH CV XLRA MEAS RIGHT PROX SCLA PSV: 193 CM/SEC
BH CV XLRA MEAS RIGHT VERTEBRAL A EDV: -4.3 CM/SEC
BH CV XLRA MEAS RIGHT VERTEBRAL A PSV: -23.5 CM/SEC
BILIRUB SERPL-MCNC: 0.9 MG/DL (ref 0–1.2)
BILIRUB UR QL STRIP: NEGATIVE
BLD GP AB SCN SERPL QL: NEGATIVE
BUN SERPL-MCNC: 15 MG/DL (ref 6–20)
BUN/CREAT SERPL: 15.3 (ref 7–25)
CALCIUM SPEC-SCNC: 9.5 MG/DL (ref 8.6–10.5)
CHLORIDE SERPL-SCNC: 102 MMOL/L (ref 98–107)
CHOLEST SERPL-MCNC: 130 MG/DL (ref 0–200)
CLARITY UR: CLEAR
CLOSE TME COLL+ADP + EPINEP PNL BLD: 96 % (ref 86–100)
CO2 BLDA-SCNC: 24.9 MMOL/L (ref 22–29)
CO2 SERPL-SCNC: 26.6 MMOL/L (ref 22–29)
COLOR UR: YELLOW
CREAT SERPL-MCNC: 0.98 MG/DL (ref 0.76–1.27)
DEPRECATED RDW RBC AUTO: 40.5 FL (ref 37–54)
EGFRCR SERPLBLD CKD-EPI 2021: 89.4 ML/MIN/1.73
EOSINOPHIL # BLD AUTO: 0.09 10*3/MM3 (ref 0–0.4)
EOSINOPHIL NFR BLD AUTO: 1.4 % (ref 0.3–6.2)
ERYTHROCYTE [DISTWIDTH] IN BLOOD BY AUTOMATED COUNT: 12.5 % (ref 12.3–15.4)
GLOBULIN UR ELPH-MCNC: 2.8 GM/DL
GLUCOSE SERPL-MCNC: 132 MG/DL (ref 65–99)
GLUCOSE UR STRIP-MCNC: NEGATIVE MG/DL
HBA1C MFR BLD: 6.36 % (ref 4.8–5.6)
HCO3 BLDA-SCNC: 23.8 MMOL/L (ref 21–28)
HCT VFR BLD AUTO: 44.9 % (ref 37.5–51)
HDLC SERPL-MCNC: 36 MG/DL (ref 40–60)
HEMODILUTION: NO
HGB BLD-MCNC: 14.7 G/DL (ref 13–17.7)
HGB UR QL STRIP.AUTO: NEGATIVE
HYALINE CASTS UR QL AUTO: NORMAL /LPF
IMM GRANULOCYTES # BLD AUTO: 0.02 10*3/MM3 (ref 0–0.05)
IMM GRANULOCYTES NFR BLD AUTO: 0.3 % (ref 0–0.5)
INHALED O2 CONCENTRATION: 21 %
INR PPP: 0.98 (ref 0.93–1.1)
KETONES UR QL STRIP: NEGATIVE
LDLC SERPL CALC-MCNC: 67 MG/DL (ref 0–100)
LDLC/HDLC SERPL: 1.76 {RATIO}
LEUKOCYTE ESTERASE UR QL STRIP.AUTO: ABNORMAL
LYMPHOCYTES # BLD AUTO: 1.6 10*3/MM3 (ref 0.7–3.1)
LYMPHOCYTES NFR BLD AUTO: 24.2 % (ref 19.6–45.3)
MCH RBC QN AUTO: 28.7 PG (ref 26.6–33)
MCHC RBC AUTO-ENTMCNC: 32.7 G/DL (ref 31.5–35.7)
MCV RBC AUTO: 87.7 FL (ref 79–97)
MODALITY: ABNORMAL
MONOCYTES # BLD AUTO: 0.49 10*3/MM3 (ref 0.1–0.9)
MONOCYTES NFR BLD AUTO: 7.4 % (ref 5–12)
MRSA DNA SPEC QL NAA+PROBE: NORMAL
NEUTROPHILS NFR BLD AUTO: 4.39 10*3/MM3 (ref 1.7–7)
NEUTROPHILS NFR BLD AUTO: 66.2 % (ref 42.7–76)
NITRITE UR QL STRIP: NEGATIVE
NRBC BLD AUTO-RTO: 0 /100 WBC (ref 0–0.2)
PCO2 BLDA: 35.9 MM HG (ref 35–48)
PH BLDA: 7.43 PH UNITS (ref 7.35–7.45)
PH UR STRIP.AUTO: 5.5 [PH] (ref 5–8)
PLATELET # BLD AUTO: 184 10*3/MM3 (ref 140–450)
PMV BLD AUTO: 9.9 FL (ref 6–12)
PO2 BLD: 437 MM[HG] (ref 0–500)
PO2 BLDA: 91.8 MM HG (ref 83–108)
POTASSIUM SERPL-SCNC: 4.1 MMOL/L (ref 3.5–5.2)
PROT SERPL-MCNC: 7.6 G/DL (ref 6–8.5)
PROT UR QL STRIP: NEGATIVE
PROTHROMBIN TIME: 10.7 SECONDS (ref 9.6–11.7)
QT INTERVAL: 407 MS
QTC INTERVAL: 442 MS
RBC # BLD AUTO: 5.12 10*6/MM3 (ref 4.14–5.8)
RBC # UR STRIP: NORMAL /HPF
REF LAB TEST METHOD: NORMAL
RH BLD: POSITIVE
SAO2 % BLDCOA: 97.4 % (ref 94–98)
SARS-COV-2 RNA RESP QL NAA+PROBE: NOT DETECTED
SODIUM SERPL-SCNC: 141 MMOL/L (ref 136–145)
SP GR UR STRIP: 1.02 (ref 1–1.03)
SQUAMOUS #/AREA URNS HPF: NORMAL /HPF
T&S EXPIRATION DATE: NORMAL
TRIGL SERPL-MCNC: 154 MG/DL (ref 0–150)
UROBILINOGEN UR QL STRIP: ABNORMAL
VLDLC SERPL-MCNC: 27 MG/DL (ref 5–40)
WBC # UR STRIP: NORMAL /HPF
WBC NRBC COR # BLD AUTO: 6.62 10*3/MM3 (ref 3.4–10.8)

## 2024-08-21 PROCEDURE — 86923 COMPATIBILITY TEST ELECTRIC: CPT

## 2024-08-21 PROCEDURE — 81001 URINALYSIS AUTO W/SCOPE: CPT

## 2024-08-21 PROCEDURE — 86900 BLOOD TYPING SEROLOGIC ABO: CPT

## 2024-08-21 PROCEDURE — 36600 WITHDRAWAL OF ARTERIAL BLOOD: CPT | Performed by: THORACIC SURGERY (CARDIOTHORACIC VASCULAR SURGERY)

## 2024-08-21 PROCEDURE — 86850 RBC ANTIBODY SCREEN: CPT

## 2024-08-21 PROCEDURE — 87641 MR-STAPH DNA AMP PROBE: CPT

## 2024-08-21 PROCEDURE — 83036 HEMOGLOBIN GLYCOSYLATED A1C: CPT

## 2024-08-21 PROCEDURE — 85025 COMPLETE CBC W/AUTO DIFF WBC: CPT

## 2024-08-21 PROCEDURE — 94060 EVALUATION OF WHEEZING: CPT

## 2024-08-21 PROCEDURE — 86901 BLOOD TYPING SEROLOGIC RH(D): CPT

## 2024-08-21 PROCEDURE — 87635 SARS-COV-2 COVID-19 AMP PRB: CPT

## 2024-08-21 PROCEDURE — 94664 DEMO&/EVAL PT USE INHALER: CPT

## 2024-08-21 PROCEDURE — 80061 LIPID PANEL: CPT

## 2024-08-21 PROCEDURE — 94799 UNLISTED PULMONARY SVC/PX: CPT

## 2024-08-21 PROCEDURE — 94729 DIFFUSING CAPACITY: CPT

## 2024-08-21 PROCEDURE — 93880 EXTRACRANIAL BILAT STUDY: CPT

## 2024-08-21 PROCEDURE — 71046 X-RAY EXAM CHEST 2 VIEWS: CPT

## 2024-08-21 PROCEDURE — 80053 COMPREHEN METABOLIC PANEL: CPT

## 2024-08-21 PROCEDURE — 94726 PLETHYSMOGRAPHY LUNG VOLUMES: CPT

## 2024-08-21 PROCEDURE — 82803 BLOOD GASES ANY COMBINATION: CPT | Performed by: THORACIC SURGERY (CARDIOTHORACIC VASCULAR SURGERY)

## 2024-08-21 PROCEDURE — 85730 THROMBOPLASTIN TIME PARTIAL: CPT

## 2024-08-21 PROCEDURE — 93005 ELECTROCARDIOGRAM TRACING: CPT | Performed by: NURSE PRACTITIONER

## 2024-08-21 PROCEDURE — 85610 PROTHROMBIN TIME: CPT

## 2024-08-21 PROCEDURE — 85576 BLOOD PLATELET AGGREGATION: CPT

## 2024-08-21 PROCEDURE — 36415 COLL VENOUS BLD VENIPUNCTURE: CPT

## 2024-08-21 RX ORDER — ALBUTEROL SULFATE 90 UG/1
2 AEROSOL, METERED RESPIRATORY (INHALATION) ONCE
Status: COMPLETED | OUTPATIENT
Start: 2024-08-21 | End: 2024-08-21

## 2024-08-21 RX ORDER — CHLORHEXIDINE GLUCONATE 500 MG/1
CLOTH TOPICAL EVERY 12 HOURS PRN
Status: ACTIVE | OUTPATIENT
Start: 2024-08-21

## 2024-08-21 RX ORDER — CHLORHEXIDINE GLUCONATE ORAL RINSE 1.2 MG/ML
15 SOLUTION DENTAL EVERY 12 HOURS
Status: ACTIVE | OUTPATIENT
Start: 2024-08-21 | End: 2024-08-22

## 2024-08-21 RX ADMIN — ALBUTEROL SULFATE 2 PUFF: 108 AEROSOL, METERED RESPIRATORY (INHALATION) at 09:51

## 2024-08-21 NOTE — PAT
Emergency Contact: Jacquelin Quiroga spouse [phone]    Password: Edwin    5 meter walk:    3.2  3.7  3.4

## 2024-08-21 NOTE — H&P
Patient Care Team:  Renard Mcintosh MD as PCP - General    Chief complaint: Ascending aortic aneurysm    Subjective     History of Present Illness:   Mr. Quiroga is a 59 year-old male with PMH of HTN, HLD, and bicuspid aortopathy who was recently seen in the office by Dr. Alex on 6/20/24 for evaluation of his ascending aortic aneurysm. Measurements from his chest CT on 5/21/24 showed his ascending aorta to be 5 cm and the descending thoracic aorta to be 2.5 cm with a tapering to the aortic arch of 3 cm. Transthoracic echocardiogram on 12/18/23 showed EF 61-65% and mild to moderate regurgitation with a bicuspid aortic valve. Cardiac catheterization on 6/17/24 revealed normal coronary arteries. Per Dr. Alex, it was recommended that patient undergo ascending aortic replacement with possible aortic valve repair or replacement and proximal arch repair. Patient presents to Pre-Admission today in preparation for surgery tomorrow. He denies recent illness or changes to his medical history since last being seen in the office.         Review of Systems   Respiratory:  Negative for shortness of breath.    Cardiovascular:  Negative for chest pain and leg swelling.   All other systems reviewed and are negative.       Past Medical History:   Diagnosis Date    Aortic insufficiency due to bicuspid aortic valve     Ascending aortic aneurysm     Bicuspid aortic valve     Heart murmur     Hyperlipidemia     Hypertension      Past Surgical History:   Procedure Laterality Date    APPENDECTOMY      CARDIAC CATHETERIZATION Right 6/17/2024    Procedure: Left Heart Cath, RADIAL;  Surgeon: Brittanie Lopez MD;  Location: [facility] CATH INVASIVE LOCATION;  Service: Cardiovascular;  Laterality: Right;  cath in June of 2024     Family History   Problem Relation Age of Onset    Hypertension Mother     Hypertension Father      Social History     Tobacco Use    Smoking status: Former     Types: Cigarettes    Smokeless tobacco:  Never   Vaping Use    Vaping status: Never Used   Substance Use Topics    Alcohol use: Yes     Comment: Rare     Drug use: No     No medications prior to admission.       Allergies:  Patient has no known allergies.    Objective      Vital Signs  Temp:  [98 °F (36.7 °C)] 98 °F (36.7 °C)  Heart Rate:  [70-72] 70  Resp:  [16] 16  BP: (135)/(81) 135/81           Physical Exam  Vitals and nursing note reviewed.   Constitutional:       General: He is awake.      Appearance: Normal appearance. He is well-developed and well-groomed.      Comments: Seen in [place] with wife   MCKENNA:      Head: Normocephalic and atraumatic.      Nose: Nose normal.      Mouth/Throat:      Lips: Pink.      Mouth: Mucous membranes are moist.      Pharynx: Uvula midline.   Eyes:      General: Lids are normal. No scleral icterus.     Extraocular Movements: Extraocular movements intact.      Conjunctiva/sclera: Conjunctivae normal.      Pupils: Pupils are equal, round, and reactive to light.   Neck:      Thyroid: No thyroid mass or thyromegaly.      Vascular: Normal carotid pulses. No carotid bruit, hepatojugular reflux or JVD.      Trachea: Trachea normal.   Cardiovascular:      Rate and Rhythm: Normal rate and regular rhythm.      Pulses:           Carotid pulses are 2+ on the right side and 2+ on the left side.       Radial pulses are 2+ on the right side and 2+ on the left side.        Femoral pulses are 2+ on the right side and 2+ on the left side.       Popliteal pulses are 2+ on the right side and 2+ on the left side.        Dorsalis pedis pulses are 2+ on the right side and 2+ on the left side.        Posterior tibial pulses are 2+ on the right side and 2+ on the left side.      Heart sounds: Normal heart sounds. No murmur heard.      with a grade of 2/4.   Pulmonary:      Effort: Pulmonary effort is normal.      Breath sounds: Normal breath sounds.   Abdominal:      General: Abdomen is protuberant. Bowel sounds are normal. There is no  distension.      Palpations: Abdomen is soft.      Tenderness: There is no abdominal tenderness.   Musculoskeletal:      Cervical back: Neck supple.      Right lower leg: No edema.      Left lower leg: No edema.      Comments: Gait steady and strong without use of assistive devices   Lymphadenopathy:      Cervical: No cervical adenopathy.      Upper Body:      Right upper body: No supraclavicular adenopathy.      Left upper body: No supraclavicular adenopathy.   Skin:     General: Skin is warm and dry.      Capillary Refill: Capillary refill takes less than 2 seconds.      Findings: No erythema or rash.      Nails: There is no clubbing.   Neurological:      Mental Status: He is alert and oriented to person, place, and time.      GCS: GCS eye subscore is 4. GCS verbal subscore is 5. GCS motor subscore is 6.   Psychiatric:         Attention and Perception: Attention and perception normal.         Mood and Affect: Mood and affect normal.         Speech: Speech normal.         Behavior: Behavior normal. Behavior is cooperative.         Thought Content: Thought content normal.         Cognition and Memory: Cognition and memory normal.         Judgment: Judgment normal.         Results Review:   Lab Results (last 24 hours)       ** No results found for the last 24 hours. **                Assessment & Plan    Bicuspid aortopathy with 5 cm ascending aortic aneurysm   Mild to moderate aortic insufficiency  Normal LV systolic function---EF 61-65%  Normal coronary arteries (cath 6/17)   HTN  HLD  Former tobacco abuse      Plan for ascending aortic replacement with possible aortic valve repair or replacement (tissue AVR) and proximal arch repair tomorrow with Dr. Alex.      KOKI Faustin  08/21/24  14:40 EDT

## 2024-08-21 NOTE — ANESTHESIA PREPROCEDURE EVALUATION
Anesthesia Evaluation     Patient summary reviewed and Nursing notes reviewed   NPO Solid Status: > 8 hours  NPO Liquid Status: > 8 hours           Airway   Mallampati: II  TM distance: >3 FB  Neck ROM: full  No difficulty expected  Dental - normal exam     Pulmonary - normal exam   Cardiovascular - normal exam    ECG reviewed    (+) hypertension, valvular problems/murmurs AI, hyperlipidemia      Neuro/Psych  (+) psychiatric history  GI/Hepatic/Renal/Endo      Musculoskeletal     Abdominal  - normal exam    Bowel sounds: normal.   Substance History      OB/GYN          Other        ROS/Med Hx Other: TAA    ·  Left ventricular systolic function is normal. Calculated left ventricular EF = 63% Left ventricular ejection fraction appears to be 61 - 65%.  ·  Left ventricular diastolic function is consistent with (grade I) impaired relaxation.  ·  The left atrial cavity is mild to moderately dilated.  ·  Estimated right ventricular systolic pressure from tricuspid regurgitation is mildly elevated (35-45 mmHg).  ·  Moderate dilation of the aortic root is present. Severe dilation of the ascending aorta is present.  ·  Ascending aortic aneurysm measuring 4.4 cm      SR       Findings:  The cardiomediastinal silhouette is within normal limits. Lungs are clear. No focal consolidation, pneumothorax, or significant pleural effusion. Osseous structures grossly intact.     IMPRESSION:  Impression:  No acute process.        Nl coronaries             IMPRESSION:  Impression:     1. Stable aneurysm of the ascending aorta measuring up to 4.8 cm.  2. Additional findings as given above.                    Anesthesia Plan    ASA 4     general, Sheri, CVL and PAC     intravenous induction   Postoperative Plan: Expected vent after surgery  Anesthetic plan, risks, benefits, and alternatives have been provided, discussed and informed consent has been obtained with: patient.    CODE STATUS:

## 2024-08-22 ENCOUNTER — ANESTHESIA (OUTPATIENT)
Dept: PERIOP | Facility: HOSPITAL | Age: 59
End: 2024-08-22
Payer: COMMERCIAL

## 2024-08-22 ENCOUNTER — HOSPITAL ENCOUNTER (INPATIENT)
Facility: HOSPITAL | Age: 59
LOS: 5 days | Discharge: HOME | DRG: 220 | End: 2024-08-27
Attending: THORACIC SURGERY (CARDIOTHORACIC VASCULAR SURGERY) | Admitting: THORACIC SURGERY (CARDIOTHORACIC VASCULAR SURGERY)
Payer: COMMERCIAL

## 2024-08-22 ENCOUNTER — OFFICE VISIT (OUTPATIENT)
Dept: PERIOP | Facility: HOSPITAL | Age: 59
DRG: 220 | End: 2024-08-22
Payer: COMMERCIAL

## 2024-08-22 ENCOUNTER — APPOINTMENT (OUTPATIENT)
Dept: GENERAL RADIOLOGY | Facility: HOSPITAL | Age: 59
DRG: 220 | End: 2024-08-22
Payer: COMMERCIAL

## 2024-08-22 DIAGNOSIS — Z98.890 S/P AORTIC ANEURYSM REPAIR: Primary | ICD-10-CM

## 2024-08-22 DIAGNOSIS — I35.1 NONRHEUMATIC AORTIC VALVE INSUFFICIENCY: ICD-10-CM

## 2024-08-22 DIAGNOSIS — I71.21 ANEURYSM OF ASCENDING AORTA WITHOUT RUPTURE: ICD-10-CM

## 2024-08-22 DIAGNOSIS — Z86.79 S/P AORTIC ANEURYSM REPAIR: Primary | ICD-10-CM

## 2024-08-22 LAB
ACT BLD: 104 SECONDS (ref 89–137)
ACT BLD: 128 SECONDS (ref 89–137)
ACT BLD: 360 SECONDS (ref 89–137)
ACT BLD: 415 SECONDS (ref 89–137)
ACT BLD: 617 SECONDS (ref 89–137)
ALBUMIN SERPL-MCNC: 4.5 G/DL (ref 3.5–5.2)
ANION GAP SERPL CALCULATED.3IONS-SCNC: 10.6 MMOL/L (ref 5–15)
APTT PPP: 21.1 SECONDS (ref 24–31)
APTT PPP: 22.9 SECONDS (ref 24–31)
ARTERIAL PATENCY WRIST A: ABNORMAL
ATMOSPHERIC PRESS: ABNORMAL MM[HG]
BASE DEFICIT: -3.5 MEQ/LITER
BASE DEFICIT: ABNORMAL
BASE EXCESS BLDA CALC-SCNC: -1.8 MMOL/L (ref 0–3)
BASE EXCESS BLDA CALC-SCNC: -2 MMOL/L (ref 0–3)
BASE EXCESS BLDA CALC-SCNC: -2.5 MMOL/L (ref 0–3)
BASE EXCESS BLDA CALC-SCNC: -2.6 MMOL/L (ref 0–3)
BASE EXCESS BLDA CALC-SCNC: -2.7 MMOL/L (ref 0–3)
BASE EXCESS BLDA CALC-SCNC: -3.1 MMOL/L (ref 0–3)
BASE EXCESS BLDA CALC-SCNC: -3.1 MMOL/L (ref 0–3)
BASE EXCESS BLDA CALC-SCNC: <0 MMOL/L (ref 0–3)
BASE EXCESS BLDV CALC-SCNC: 1 MMOL/L (ref 0–3)
BASOPHILS # BLD AUTO: 0.03 10*3/MM3 (ref 0–0.2)
BASOPHILS NFR BLD AUTO: 0.2 % (ref 0–1.5)
BDY SITE: ABNORMAL
BH BB BLOOD EXPIRATION DATE: NORMAL
BH BB BLOOD EXPIRATION DATE: NORMAL
BH BB BLOOD TYPE BARCODE: 6200
BH BB BLOOD TYPE BARCODE: 6200
BH BB DISPENSE STATUS: NORMAL
BH BB DISPENSE STATUS: NORMAL
BH BB PRODUCT CODE: NORMAL
BH BB PRODUCT CODE: NORMAL
BH BB UNIT NUMBER: NORMAL
BH BB UNIT NUMBER: NORMAL
BUN SERPL-MCNC: 11 MG/DL (ref 6–20)
BUN/CREAT SERPL: 11.5 (ref 7–25)
CA-I BLDA-SCNC: 1.01 MMOL/L (ref 1.12–1.32)
CA-I BLDA-SCNC: 1.01 MMOL/L (ref 1.12–1.32)
CA-I BLDA-SCNC: 1.03 MMOL/L (ref 1.12–1.32)
CA-I BLDA-SCNC: 1.11 MMOL/L (ref 1.15–1.33)
CA-I BLDA-SCNC: 1.11 MMOL/L (ref 1.15–1.33)
CA-I BLDA-SCNC: 1.12 MMOL/L (ref 1.15–1.33)
CA-I BLDA-SCNC: 1.12 MMOL/L (ref 1.15–1.33)
CA-I BLDA-SCNC: 1.14 MMOL/L (ref 1.15–1.33)
CA-I BLDA-SCNC: 1.16 MMOL/L (ref 1.15–1.33)
CA-I BLDA-SCNC: 1.17 MMOL/L (ref 1.12–1.32)
CA-I BLDA-SCNC: 1.19 MMOL/L (ref 1.15–1.33)
CA-I BLDA-SCNC: 1.29 MMOL/L (ref 1.12–1.32)
CA-I SERPL ISE-MCNC: 1.09 MMOL/L (ref 1.15–1.3)
CALCIUM SPEC-SCNC: 8.6 MG/DL (ref 8.6–10.5)
CHLORIDE SERPL-SCNC: 107 MMOL/L (ref 98–107)
CLOSE TME COLL+ADP + EPINEP PNL BLD: 94 % (ref 86–100)
CLOSE TME COLL+ADP + EPINEP PNL BLD: 96 % (ref 86–100)
CO2 BLDA-SCNC: 22 MMOL/L (ref 23–27)
CO2 BLDA-SCNC: 26 MMOL/L (ref 23–27)
CO2 BLDA-SCNC: 26 MMOL/L (ref 23–27)
CO2 BLDA-SCNC: 27 MMOL/L (ref 23–27)
CO2 BLDA-SCNC: 37.5 MMOL/L (ref 22–29)
CO2 CONTENT VENOUS: 30 MMOL/L (ref 24–29)
CO2 SERPL-SCNC: 21.4 MMOL/L (ref 22–29)
CREAT BLDA-MCNC: 0.76 MG/DL (ref 0.6–1.3)
CREAT BLDA-MCNC: 0.84 MG/DL (ref 0.6–1.3)
CREAT BLDA-MCNC: 0.86 MG/DL (ref 0.6–1.3)
CREAT BLDA-MCNC: 0.86 MG/DL (ref 0.6–1.3)
CREAT BLDA-MCNC: 0.91 MG/DL (ref 0.6–1.3)
CREAT SERPL-MCNC: 0.96 MG/DL (ref 0.76–1.27)
CROSSMATCH INTERPRETATION: NORMAL
CROSSMATCH INTERPRETATION: NORMAL
D-LACTATE SERPL-SCNC: 1.9 MMOL/L (ref 0.2–2)
D-LACTATE SERPL-SCNC: 2 MMOL/L (ref 0.2–2)
D-LACTATE SERPL-SCNC: 2.1 MMOL/L (ref 0.2–2)
D-LACTATE SERPL-SCNC: 2.5 MMOL/L (ref 0.2–2)
D-LACTATE SERPL-SCNC: 2.9 MMOL/L (ref 0.2–2)
DEPRECATED RDW RBC AUTO: 39.8 FL (ref 37–54)
DEPRECATED RDW RBC AUTO: 40.1 FL (ref 37–54)
DEPRECATED RDW RBC AUTO: 40.3 FL (ref 37–54)
DEPRECATED RDW RBC AUTO: 41.1 FL (ref 37–54)
EGFRCR SERPLBLD CKD-EPI 2021: 100.4 ML/MIN/1.73
EGFRCR SERPLBLD CKD-EPI 2021: 100.4 ML/MIN/1.73
EGFRCR SERPLBLD CKD-EPI 2021: 101.1 ML/MIN/1.73
EGFRCR SERPLBLD CKD-EPI 2021: 104.2 ML/MIN/1.73
EGFRCR SERPLBLD CKD-EPI 2021: 91.6 ML/MIN/1.73
EGFRCR SERPLBLD CKD-EPI 2021: 97.7 ML/MIN/1.73
EOSINOPHIL # BLD AUTO: 0.05 10*3/MM3 (ref 0–0.4)
EOSINOPHIL NFR BLD AUTO: 0.4 % (ref 0.3–6.2)
ERYTHROCYTE [DISTWIDTH] IN BLOOD BY AUTOMATED COUNT: 12.5 % (ref 12.3–15.4)
ERYTHROCYTE [DISTWIDTH] IN BLOOD BY AUTOMATED COUNT: 12.5 % (ref 12.3–15.4)
ERYTHROCYTE [DISTWIDTH] IN BLOOD BY AUTOMATED COUNT: 12.6 % (ref 12.3–15.4)
ERYTHROCYTE [DISTWIDTH] IN BLOOD BY AUTOMATED COUNT: 12.6 % (ref 12.3–15.4)
FIBRINOGEN PPP-MCNC: 211 MG/DL (ref 210–450)
FIBRINOGEN PPP-MCNC: 214 MG/DL (ref 210–450)
GLUCOSE BLDC GLUCOMTR-MCNC: 119 MG/DL (ref 74–100)
GLUCOSE BLDC GLUCOMTR-MCNC: 119 MG/DL (ref 74–100)
GLUCOSE BLDC GLUCOMTR-MCNC: 123 MG/DL (ref 70–105)
GLUCOSE BLDC GLUCOMTR-MCNC: 127 MG/DL (ref 70–105)
GLUCOSE BLDC GLUCOMTR-MCNC: 137 MG/DL (ref 70–105)
GLUCOSE BLDC GLUCOMTR-MCNC: 139 MG/DL (ref 74–100)
GLUCOSE BLDC GLUCOMTR-MCNC: 139 MG/DL (ref 74–100)
GLUCOSE BLDC GLUCOMTR-MCNC: 141 MG/DL (ref 74–100)
GLUCOSE BLDC GLUCOMTR-MCNC: 141 MG/DL (ref 74–100)
GLUCOSE BLDC GLUCOMTR-MCNC: 142 MG/DL (ref 70–105)
GLUCOSE BLDC GLUCOMTR-MCNC: 143 MG/DL (ref 70–105)
GLUCOSE BLDC GLUCOMTR-MCNC: 148 MG/DL (ref 74–100)
GLUCOSE BLDC GLUCOMTR-MCNC: 148 MG/DL (ref 74–100)
GLUCOSE BLDC GLUCOMTR-MCNC: 149 MG/DL (ref 74–100)
GLUCOSE BLDC GLUCOMTR-MCNC: 149 MG/DL (ref 74–100)
GLUCOSE BLDC GLUCOMTR-MCNC: 150 MG/DL (ref 70–105)
GLUCOSE BLDC GLUCOMTR-MCNC: 154 MG/DL (ref 70–105)
GLUCOSE BLDC GLUCOMTR-MCNC: 156 MG/DL (ref 70–105)
GLUCOSE BLDC GLUCOMTR-MCNC: 162 MG/DL (ref 70–105)
GLUCOSE BLDC GLUCOMTR-MCNC: 172 MG/DL (ref 70–105)
GLUCOSE BLDC GLUCOMTR-MCNC: 178 MG/DL (ref 74–100)
GLUCOSE BLDC GLUCOMTR-MCNC: 178 MG/DL (ref 74–100)
GLUCOSE BLDC GLUCOMTR-MCNC: 205 MG/DL (ref 74–100)
GLUCOSE BLDC GLUCOMTR-MCNC: 205 MG/DL (ref 74–100)
GLUCOSE SERPL-MCNC: 111 MG/DL (ref 65–99)
HCO3 BLDA-SCNC: 20.9 MMOL/L (ref 21–28)
HCO3 BLDA-SCNC: 21 MMOL/L (ref 21–28)
HCO3 BLDA-SCNC: 21.2 MMOL/L (ref 22–26)
HCO3 BLDA-SCNC: 22.6 MMOL/L (ref 21–28)
HCO3 BLDA-SCNC: 22.7 MMOL/L (ref 21–28)
HCO3 BLDA-SCNC: 22.7 MMOL/L (ref 21–28)
HCO3 BLDA-SCNC: 22.9 MMOL/L (ref 21–28)
HCO3 BLDA-SCNC: 24 MMOL/L (ref 22–26)
HCO3 BLDA-SCNC: 24.5 MMOL/L (ref 22–26)
HCO3 BLDA-SCNC: 25.5 MMOL/L (ref 22–26)
HCO3 BLDV-SCNC: 27.9 MMOL/L (ref 23–28)
HCO3 MIXED: 21.7 MMOL/L (ref 21–29)
HCT VFR BLD AUTO: 34.3 % (ref 37.5–51)
HCT VFR BLD AUTO: 34.7 % (ref 37.5–51)
HCT VFR BLD AUTO: 35.6 % (ref 37.5–51)
HCT VFR BLD AUTO: 35.7 % (ref 37.5–51)
HCT VFR BLDA CALC: 31 % (ref 38–51)
HCT VFR BLDA CALC: 32 % (ref 38–51)
HCT VFR BLDA CALC: 33 % (ref 38–51)
HCT VFR BLDA CALC: 34 % (ref 38–51)
HCT VFR BLDA CALC: 35 % (ref 38–51)
HCT VFR BLDA CALC: 36 % (ref 38–51)
HCT VFR BLDA CALC: 36 % (ref 38–51)
HCT VFR BLDA CALC: 37 % (ref 38–51)
HEMODILUTION: NO
HGB BLD-MCNC: 11.1 G/DL (ref 13–17.7)
HGB BLD-MCNC: 11.1 G/DL (ref 13–17.7)
HGB BLD-MCNC: 11.7 G/DL (ref 13–17.7)
HGB BLD-MCNC: 12.1 G/DL (ref 13–17.7)
HGB BLDA-MCNC: 10.5 G/DL (ref 12–17)
HGB BLDA-MCNC: 11 G/DL (ref 12–17)
HGB BLDA-MCNC: 11.2 G/DL (ref 12–17)
HGB BLDA-MCNC: 11.5 G/DL (ref 12–17)
HGB BLDA-MCNC: 11.6 G/DL (ref 12–17)
HGB BLDA-MCNC: 11.7 G/DL (ref 12–17)
HGB BLDA-MCNC: 11.9 G/DL (ref 12–17)
HGB BLDA-MCNC: 12.2 G/DL (ref 12–17)
HGB BLDA-MCNC: 12.3 G/DL (ref 12–17)
HGB BLDA-MCNC: 12.4 G/DL (ref 12–17)
IMM GRANULOCYTES # BLD AUTO: 0.07 10*3/MM3 (ref 0–0.05)
IMM GRANULOCYTES NFR BLD AUTO: 0.5 % (ref 0–0.5)
INHALED O2 CONCENTRATION: 28 %
INHALED O2 CONCENTRATION: 40 %
INHALED O2 CONCENTRATION: 50 %
INHALED O2 CONCENTRATION: 70 %
INHALED O2 CONCENTRATION: 70 %
INR PPP: 1.11 (ref 0.93–1.1)
INR PPP: 1.34 (ref 0.93–1.1)
LYMPHOCYTES # BLD AUTO: 0.8 10*3/MM3 (ref 0.7–3.1)
LYMPHOCYTES NFR BLD AUTO: 5.7 % (ref 19.6–45.3)
Lab: ABNORMAL
MCH RBC QN AUTO: 28.5 PG (ref 26.6–33)
MCH RBC QN AUTO: 28.6 PG (ref 26.6–33)
MCH RBC QN AUTO: 28.8 PG (ref 26.6–33)
MCH RBC QN AUTO: 29.4 PG (ref 26.6–33)
MCHC RBC AUTO-ENTMCNC: 32 G/DL (ref 31.5–35.7)
MCHC RBC AUTO-ENTMCNC: 32.4 G/DL (ref 31.5–35.7)
MCHC RBC AUTO-ENTMCNC: 32.9 G/DL (ref 31.5–35.7)
MCHC RBC AUTO-ENTMCNC: 33.9 G/DL (ref 31.5–35.7)
MCV RBC AUTO: 86.9 FL (ref 79–97)
MCV RBC AUTO: 87.7 FL (ref 79–97)
MCV RBC AUTO: 87.9 FL (ref 79–97)
MCV RBC AUTO: 89.4 FL (ref 79–97)
MODALITY: ABNORMAL
MONOCYTES # BLD AUTO: 0.86 10*3/MM3 (ref 0.1–0.9)
MONOCYTES NFR BLD AUTO: 6.1 % (ref 5–12)
NEUTROPHILS NFR BLD AUTO: 12.33 10*3/MM3 (ref 1.7–7)
NEUTROPHILS NFR BLD AUTO: 87.1 % (ref 42.7–76)
NOTIFIED WHO: ABNORMAL
NRBC BLD AUTO-RTO: 0 /100 WBC (ref 0–0.2)
O2 SATURATION MIXED: 79 %
PCO2 BLDA: 31.2 MM HG (ref 35–48)
PCO2 BLDA: 32 MM HG (ref 35–48)
PCO2 BLDA: 36.9 MM HG (ref 35–48)
PCO2 BLDA: 37 MM HG (ref 35–48)
PCO2 BLDA: 40.2 MM HG (ref 35–45)
PCO2 BLDA: 41.3 MM HG (ref 35–48)
PCO2 BLDA: 42.7 MM HG (ref 35–48)
PCO2 BLDA: 53.8 MM HG (ref 35–45)
PCO2 BLDA: 54.1 MM HG (ref 35–45)
PCO2 BLDA: 54.6 MM HG (ref 35–45)
PCO2 BLDV: 64.9 MM HG (ref 41–51)
PCO2 MIXED: 37.5 MMHG (ref 35–51)
PEEP RESPIRATORY: 5 CM[H2O]
PEEP RESPIRATORY: 6 CM[H2O]
PEEP RESPIRATORY: 8 CM[H2O]
PEEP RESPIRATORY: 8 CM[H2O]
PH BLDA: 7.25 PH UNITS (ref 7.35–7.45)
PH BLDA: 7.27 PH UNITS (ref 7.35–7.45)
PH BLDA: 7.28 PH UNITS (ref 7.35–7.45)
PH BLDA: 7.33 PH UNITS (ref 7.35–7.45)
PH BLDA: 7.33 PH UNITS (ref 7.35–7.45)
PH BLDA: 7.35 PH UNITS (ref 7.35–7.45)
PH BLDA: 7.39 PH UNITS (ref 7.35–7.45)
PH BLDA: 7.4 PH UNITS (ref 7.35–7.45)
PH BLDA: 7.42 PH UNITS (ref 7.35–7.45)
PH BLDA: 7.43 PH UNITS (ref 7.35–7.45)
PH BLDV: 7.24 PH UNITS (ref 7.31–7.41)
PH MIXED: 7.37 PH UNITS (ref 7.32–7.45)
PHOSPHATE SERPL-MCNC: 1.4 MG/DL (ref 2.5–4.5)
PLATELET # BLD AUTO: 107 10*3/MM3 (ref 140–450)
PLATELET # BLD AUTO: 127 10*3/MM3 (ref 140–450)
PLATELET # BLD AUTO: 150 10*3/MM3 (ref 140–450)
PLATELET # BLD AUTO: 194 10*3/MM3 (ref 140–450)
PMV BLD AUTO: 9.6 FL (ref 6–12)
PMV BLD AUTO: 9.7 FL (ref 6–12)
PMV BLD AUTO: 9.9 FL (ref 6–12)
PMV BLD AUTO: 9.9 FL (ref 6–12)
PO2 BLD: 191 MM[HG] (ref 0–500)
PO2 BLD: 318 MM[HG] (ref 0–500)
PO2 BLD: 328 MM[HG] (ref 0–500)
PO2 BLD: 333 MM[HG] (ref 0–500)
PO2 BLD: 335 MM[HG] (ref 0–500)
PO2 BLD: 364 MM[HG] (ref 0–500)
PO2 BLDA: 131.2 MM HG (ref 83–108)
PO2 BLDA: 133.9 MM HG (ref 83–108)
PO2 BLDA: 134 MM HG (ref 83–108)
PO2 BLDA: 145.6 MM HG (ref 83–108)
PO2 BLDA: 158.9 MM HG (ref 83–108)
PO2 BLDA: 303 MM HG (ref 80–105)
PO2 BLDA: 380 MM HG (ref 80–105)
PO2 BLDA: 383 MM HG (ref 80–105)
PO2 BLDA: 447 MM HG (ref 80–105)
PO2 BLDA: 93.3 MM HG (ref 83–108)
PO2 BLDV: 50 MM HG (ref 35–42)
PO2 MIXED: 44.3 MMHG
POTASSIUM BLDA-SCNC: 3.7 MMOL/L (ref 3.5–4.5)
POTASSIUM BLDA-SCNC: 3.7 MMOL/L (ref 3.5–4.5)
POTASSIUM BLDA-SCNC: 3.8 MMOL/L (ref 3.5–4.9)
POTASSIUM BLDA-SCNC: 3.9 MMOL/L (ref 3.5–4.5)
POTASSIUM BLDA-SCNC: 4 MMOL/L (ref 3.5–4.5)
POTASSIUM BLDA-SCNC: 4.1 MMOL/L (ref 3.5–4.5)
POTASSIUM BLDA-SCNC: 4.2 MMOL/L (ref 3.5–4.5)
POTASSIUM BLDA-SCNC: 4.4 MMOL/L (ref 3.5–4.5)
POTASSIUM BLDA-SCNC: 4.7 MMOL/L (ref 3.5–4.9)
POTASSIUM BLDA-SCNC: 4.8 MMOL/L (ref 3.5–4.9)
POTASSIUM BLDA-SCNC: 4.9 MMOL/L (ref 3.5–4.9)
POTASSIUM BLDA-SCNC: 5.4 MMOL/L (ref 3.5–4.9)
POTASSIUM SERPL-SCNC: 4.4 MMOL/L (ref 3.5–5.2)
PROTHROMBIN TIME: 12 SECONDS (ref 9.6–11.7)
PROTHROMBIN TIME: 14.3 SECONDS (ref 9.6–11.7)
PSV: 10 CMH2O
QT INTERVAL: 429 MS
QTC INTERVAL: 444 MS
RBC # BLD AUTO: 3.88 10*6/MM3 (ref 4.14–5.8)
RBC # BLD AUTO: 3.9 10*6/MM3 (ref 4.14–5.8)
RBC # BLD AUTO: 4.06 10*6/MM3 (ref 4.14–5.8)
RBC # BLD AUTO: 4.11 10*6/MM3 (ref 4.14–5.8)
READ BACK: ABNORMAL
RESPIRATORY RATE: 14
SAO2 % BLDCOA: 100 % (ref 95–98)
SAO2 % BLDCOA: 96.7 % (ref 94–98)
SAO2 % BLDCOA: 99 % (ref 94–98)
SAO2 % BLDCOA: 99.1 % (ref 94–98)
SAO2 % BLDCOA: 99.1 % (ref 94–98)
SAO2 % BLDCOA: 99.2 % (ref 94–98)
SAO2 % BLDCOA: 99.5 % (ref 94–98)
SAO2 % BLDCOV: ABNORMAL %
SET MECH RESP RATE: 14
SODIUM BLD-SCNC: 137 MMOL/L (ref 138–146)
SODIUM BLD-SCNC: 140 MMOL/L (ref 138–146)
SODIUM BLD-SCNC: 141 MMOL/L (ref 138–146)
SODIUM BLD-SCNC: 142 MMOL/L (ref 138–146)
SODIUM SERPL-SCNC: 139 MMOL/L (ref 136–145)
UNIT  ABO: NORMAL
UNIT  ABO: NORMAL
UNIT  RH: NORMAL
UNIT  RH: NORMAL
VENTILATOR MODE: ABNORMAL
VENTILATOR MODE: AC
VT ON VENT VENT: 600 ML
VT ON VENT VENT: 700 ML
WBC NRBC COR # BLD AUTO: 12.2 10*3/MM3 (ref 3.4–10.8)
WBC NRBC COR # BLD AUTO: 12.26 10*3/MM3 (ref 3.4–10.8)
WBC NRBC COR # BLD AUTO: 14.14 10*3/MM3 (ref 3.4–10.8)
WBC NRBC COR # BLD AUTO: 14.62 10*3/MM3 (ref 3.4–10.8)

## 2024-08-22 PROCEDURE — 85027 COMPLETE CBC AUTOMATED: CPT | Performed by: THORACIC SURGERY (CARDIOTHORACIC VASCULAR SURGERY)

## 2024-08-22 PROCEDURE — 94002 VENT MGMT INPAT INIT DAY: CPT

## 2024-08-22 PROCEDURE — 02RX0JZ REPLACEMENT OF THORACIC AORTA, ASCENDING/ARCH WITH SYNTHETIC SUBSTITUTE, OPEN APPROACH: ICD-10-PCS | Performed by: THORACIC SURGERY (CARDIOTHORACIC VASCULAR SURGERY)

## 2024-08-22 PROCEDURE — 25010000002 CALCIUM GLUCONATE 2-0.675 GM/100ML-% SOLUTION: Performed by: NURSE PRACTITIONER

## 2024-08-22 PROCEDURE — 5A1221Z PERFORMANCE OF CARDIAC OUTPUT, CONTINUOUS: ICD-10-PCS | Performed by: THORACIC SURGERY (CARDIOTHORACIC VASCULAR SURGERY)

## 2024-08-22 PROCEDURE — 85730 THROMBOPLASTIN TIME PARTIAL: CPT | Performed by: THORACIC SURGERY (CARDIOTHORACIC VASCULAR SURGERY)

## 2024-08-22 PROCEDURE — 25010000002 ACETAMINOPHEN 10 MG/ML SOLUTION: Performed by: NURSE PRACTITIONER

## 2024-08-22 PROCEDURE — 25810000003 SODIUM CHLORIDE PER 500 ML: Performed by: THORACIC SURGERY (CARDIOTHORACIC VASCULAR SURGERY)

## 2024-08-22 PROCEDURE — C1751 CATH, INF, PER/CENT/MIDLINE: HCPCS | Performed by: ANESTHESIOLOGY

## 2024-08-22 PROCEDURE — C1713 ANCHOR/SCREW BN/BN,TIS/BN: HCPCS | Performed by: THORACIC SURGERY (CARDIOTHORACIC VASCULAR SURGERY)

## 2024-08-22 PROCEDURE — 25010000002 MAGNESIUM SULFATE PER 500 MG OF MAGNESIUM: Performed by: ANESTHESIOLOGY

## 2024-08-22 PROCEDURE — 85576 BLOOD PLATELET AGGREGATION: CPT | Performed by: THORACIC SURGERY (CARDIOTHORACIC VASCULAR SURGERY)

## 2024-08-22 PROCEDURE — 84132 ASSAY OF SERUM POTASSIUM: CPT

## 2024-08-22 PROCEDURE — 85347 COAGULATION TIME ACTIVATED: CPT

## 2024-08-22 PROCEDURE — 25810000003 SODIUM CHLORIDE 0.9 % SOLUTION 250 ML FLEX CONT: Performed by: NURSE PRACTITIONER

## 2024-08-22 PROCEDURE — 02L70CK OCCLUSION OF LEFT ATRIAL APPENDAGE WITH EXTRALUMINAL DEVICE, OPEN APPROACH: ICD-10-PCS | Performed by: THORACIC SURGERY (CARDIOTHORACIC VASCULAR SURGERY)

## 2024-08-22 PROCEDURE — 94761 N-INVAS EAR/PLS OXIMETRY MLT: CPT

## 2024-08-22 PROCEDURE — 25010000002 NICARDIPINE 2.5 MG/ML SOLUTION: Performed by: ANESTHESIOLOGY

## 2024-08-22 PROCEDURE — 80051 ELECTROLYTE PANEL: CPT

## 2024-08-22 PROCEDURE — 25010000002 PROTAMINE SULFATE PER 10 MG: Performed by: ANESTHESIOLOGY

## 2024-08-22 PROCEDURE — 94799 UNLISTED PULMONARY SVC/PX: CPT

## 2024-08-22 PROCEDURE — 80069 RENAL FUNCTION PANEL: CPT | Performed by: NURSE PRACTITIONER

## 2024-08-22 PROCEDURE — B24BZZ4 ULTRASONOGRAPHY OF HEART WITH AORTA, TRANSESOPHAGEAL: ICD-10-PCS | Performed by: THORACIC SURGERY (CARDIOTHORACIC VASCULAR SURGERY)

## 2024-08-22 PROCEDURE — 25010000002 GLYCOPYRROLATE 1 MG/5ML SOLUTION: Performed by: ANESTHESIOLOGY

## 2024-08-22 PROCEDURE — 33391 VALVULOPLASTY AORTIC VALVE: CPT | Performed by: PHYSICIAN ASSISTANT

## 2024-08-22 PROCEDURE — 25010000002 CEFAZOLIN PER 500 MG: Performed by: ANESTHESIOLOGY

## 2024-08-22 PROCEDURE — 25010000002 FENTANYL CITRATE (PF) 250 MCG/5ML SOLUTION: Performed by: ANESTHESIOLOGY

## 2024-08-22 PROCEDURE — P9041 ALBUMIN (HUMAN),5%, 50ML: HCPCS | Performed by: NURSE PRACTITIONER

## 2024-08-22 PROCEDURE — 85014 HEMATOCRIT: CPT

## 2024-08-22 PROCEDURE — P9035 PLATELET PHERES LEUKOREDUCED: HCPCS

## 2024-08-22 PROCEDURE — 36430 TRANSFUSION BLD/BLD COMPNT: CPT

## 2024-08-22 PROCEDURE — 85018 HEMOGLOBIN: CPT

## 2024-08-22 PROCEDURE — 33859 AS-AORT GRF F/DS OTH/THN DSJ: CPT | Performed by: PHYSICIAN ASSISTANT

## 2024-08-22 PROCEDURE — 25810000003 SODIUM CHLORIDE 0.9 % SOLUTION: Performed by: NURSE PRACTITIONER

## 2024-08-22 PROCEDURE — 33268 EXCL LAA OPN OTH PX ANY METH: CPT | Performed by: THORACIC SURGERY (CARDIOTHORACIC VASCULAR SURGERY)

## 2024-08-22 PROCEDURE — 85610 PROTHROMBIN TIME: CPT | Performed by: NURSE PRACTITIONER

## 2024-08-22 PROCEDURE — 25010000002 ALBUMIN HUMAN 5% PER 50 ML: Performed by: NURSE PRACTITIONER

## 2024-08-22 PROCEDURE — 88304 TISSUE EXAM BY PATHOLOGIST: CPT | Performed by: THORACIC SURGERY (CARDIOTHORACIC VASCULAR SURGERY)

## 2024-08-22 PROCEDURE — 71045 X-RAY EXAM CHEST 1 VIEW: CPT

## 2024-08-22 PROCEDURE — 82803 BLOOD GASES ANY COMBINATION: CPT | Performed by: NURSE PRACTITIONER

## 2024-08-22 PROCEDURE — C1768 GRAFT, VASCULAR: HCPCS | Performed by: THORACIC SURGERY (CARDIOTHORACIC VASCULAR SURGERY)

## 2024-08-22 PROCEDURE — 25010000002 ONDANSETRON PER 1 MG: Performed by: ANESTHESIOLOGY

## 2024-08-22 PROCEDURE — 25010000002 MORPHINE PER 10 MG: Performed by: NURSE PRACTITIONER

## 2024-08-22 PROCEDURE — 25010000002 NICARDIPINE 2.5 MG/ML SOLUTION 10 ML VIAL: Performed by: NURSE PRACTITIONER

## 2024-08-22 PROCEDURE — 85384 FIBRINOGEN ACTIVITY: CPT | Performed by: THORACIC SURGERY (CARDIOTHORACIC VASCULAR SURGERY)

## 2024-08-22 PROCEDURE — C1889 IMPLANT/INSERT DEVICE, NOC: HCPCS | Performed by: THORACIC SURGERY (CARDIOTHORACIC VASCULAR SURGERY)

## 2024-08-22 PROCEDURE — 33268 EXCL LAA OPN OTH PX ANY METH: CPT | Performed by: PHYSICIAN ASSISTANT

## 2024-08-22 PROCEDURE — 82565 ASSAY OF CREATININE: CPT

## 2024-08-22 PROCEDURE — 82330 ASSAY OF CALCIUM: CPT

## 2024-08-22 PROCEDURE — 83605 ASSAY OF LACTIC ACID: CPT

## 2024-08-22 PROCEDURE — 82947 ASSAY GLUCOSE BLOOD QUANT: CPT

## 2024-08-22 PROCEDURE — 25010000002 HEPARIN (PORCINE) PER 1000 UNITS: Performed by: THORACIC SURGERY (CARDIOTHORACIC VASCULAR SURGERY)

## 2024-08-22 PROCEDURE — 85027 COMPLETE CBC AUTOMATED: CPT | Performed by: NURSE PRACTITIONER

## 2024-08-22 PROCEDURE — 82330 ASSAY OF CALCIUM: CPT | Performed by: NURSE PRACTITIONER

## 2024-08-22 PROCEDURE — C1751 CATH, INF, PER/CENT/MIDLINE: HCPCS | Performed by: THORACIC SURGERY (CARDIOTHORACIC VASCULAR SURGERY)

## 2024-08-22 PROCEDURE — 85025 COMPLETE CBC W/AUTO DIFF WBC: CPT | Performed by: NURSE PRACTITIONER

## 2024-08-22 PROCEDURE — 93005 ELECTROCARDIOGRAM TRACING: CPT | Performed by: NURSE PRACTITIONER

## 2024-08-22 PROCEDURE — 93010 ELECTROCARDIOGRAM REPORT: CPT | Performed by: INTERNAL MEDICINE

## 2024-08-22 PROCEDURE — 82803 BLOOD GASES ANY COMBINATION: CPT

## 2024-08-22 PROCEDURE — 85730 THROMBOPLASTIN TIME PARTIAL: CPT | Performed by: NURSE PRACTITIONER

## 2024-08-22 PROCEDURE — 25010000002 MIDAZOLAM PER 1 MG: Performed by: ANESTHESIOLOGY

## 2024-08-22 PROCEDURE — 25010000002 NITROGLYCERIN 200 MCG/ML SOLUTION: Performed by: ANESTHESIOLOGY

## 2024-08-22 PROCEDURE — 84295 ASSAY OF SERUM SODIUM: CPT

## 2024-08-22 PROCEDURE — 25010000002 CEFAZOLIN PER 500 MG: Performed by: NURSE PRACTITIONER

## 2024-08-22 PROCEDURE — 25010000002 ACETAMINOPHEN 10 MG/ML SOLUTION: Performed by: ANESTHESIOLOGY

## 2024-08-22 PROCEDURE — 85610 PROTHROMBIN TIME: CPT | Performed by: THORACIC SURGERY (CARDIOTHORACIC VASCULAR SURGERY)

## 2024-08-22 PROCEDURE — 25010000002 MAGNESIUM SULFATE IN D5W 1G/100ML (PREMIX) 1-5 GM/100ML-% SOLUTION: Performed by: NURSE PRACTITIONER

## 2024-08-22 PROCEDURE — 33859 AS-AORT GRF F/DS OTH/THN DSJ: CPT | Performed by: THORACIC SURGERY (CARDIOTHORACIC VASCULAR SURGERY)

## 2024-08-22 PROCEDURE — 99222 1ST HOSP IP/OBS MODERATE 55: CPT | Performed by: INTERNAL MEDICINE

## 2024-08-22 PROCEDURE — C1887 CATHETER, GUIDING: HCPCS | Performed by: THORACIC SURGERY (CARDIOTHORACIC VASCULAR SURGERY)

## 2024-08-22 PROCEDURE — 33391 VALVULOPLASTY AORTIC VALVE: CPT | Performed by: THORACIC SURGERY (CARDIOTHORACIC VASCULAR SURGERY)

## 2024-08-22 PROCEDURE — 25810000003 SODIUM CHLORIDE 0.9 % SOLUTION: Performed by: ANESTHESIOLOGY

## 2024-08-22 PROCEDURE — P9100 PATHOGEN TEST FOR PLATELETS: HCPCS

## 2024-08-22 PROCEDURE — C1729 CATH, DRAINAGE: HCPCS | Performed by: THORACIC SURGERY (CARDIOTHORACIC VASCULAR SURGERY)

## 2024-08-22 PROCEDURE — 82948 REAGENT STRIP/BLOOD GLUCOSE: CPT

## 2024-08-22 PROCEDURE — 25010000002 HEPARIN (PORCINE) PER 1000 UNITS: Performed by: ANESTHESIOLOGY

## 2024-08-22 DEVICE — GELWEAVE GELATIN IMPREGNATED WOVEN VASCULAR PROSTHESIS STRAIGHT
Type: IMPLANTABLE DEVICE | Site: CHEST | Status: FUNCTIONAL
Brand: GELWEAVE™

## 2024-08-22 DEVICE — ABSORBABLE HEMOSTAT (OXIDIZED REGENERATED CELLULOSE)
Type: IMPLANTABLE DEVICE | Site: CHEST | Status: FUNCTIONAL
Brand: SURGICEL

## 2024-08-22 DEVICE — INCISIONLINE PLEDGET SFT 133X25.5MM: Type: IMPLANTABLE DEVICE | Site: CHEST | Status: FUNCTIONAL

## 2024-08-22 DEVICE — SS SUTURE, 6 PER SLEEVE
Type: IMPLANTABLE DEVICE | Site: STERNUM | Status: FUNCTIONAL
Brand: MYO/WIRE II

## 2024-08-22 DEVICE — SS SUTURE, 3 PER SLEEVE
Type: IMPLANTABLE DEVICE | Site: STERNUM | Status: FUNCTIONAL
Brand: MYO/WIRE II

## 2024-08-22 DEVICE — TEMP PACING WIRE
Type: IMPLANTABLE DEVICE | Site: HEART | Status: FUNCTIONAL
Brand: MYO/WIRE

## 2024-08-22 DEVICE — INCISIONLINE PLEDGET TFLN SFT LG: Type: IMPLANTABLE DEVICE | Site: CHEST | Status: FUNCTIONAL

## 2024-08-22 DEVICE — APPL CLIP LAA ATRICLIP FLX 35MM: Type: IMPLANTABLE DEVICE | Site: CHEST | Status: FUNCTIONAL

## 2024-08-22 DEVICE — WAX,BONE,NATURAL
Type: IMPLANTABLE DEVICE | Site: STERNUM | Status: FUNCTIONAL
Brand: MEDLINE INDUSTRIES

## 2024-08-22 DEVICE — DEV CONTRL TISS STRATAFIX SPIRAL MNCRYL UD 3/0 PLS 30CM: Type: IMPLANTABLE DEVICE | Site: CHEST | Status: FUNCTIONAL

## 2024-08-22 RX ORDER — METOPROLOL TARTRATE 1 MG/ML
INJECTION, SOLUTION INTRAVENOUS AS NEEDED
Status: DISCONTINUED | OUTPATIENT
Start: 2024-08-22 | End: 2024-08-22 | Stop reason: SURG

## 2024-08-22 RX ORDER — ACETAMINOPHEN 650 MG/1
650 SUPPOSITORY RECTAL EVERY 4 HOURS PRN
Status: DISCONTINUED | OUTPATIENT
Start: 2024-08-23 | End: 2024-08-27 | Stop reason: HOSPADM

## 2024-08-22 RX ORDER — PANTOPRAZOLE SODIUM 40 MG/1
40 TABLET, DELAYED RELEASE ORAL
Status: DISCONTINUED | OUTPATIENT
Start: 2024-08-23 | End: 2024-08-27 | Stop reason: HOSPADM

## 2024-08-22 RX ORDER — ONDANSETRON 2 MG/ML
4 INJECTION INTRAMUSCULAR; INTRAVENOUS EVERY 6 HOURS PRN
Status: DISCONTINUED | OUTPATIENT
Start: 2024-08-22 | End: 2024-08-27 | Stop reason: HOSPADM

## 2024-08-22 RX ORDER — NITROGLYCERIN 0.4 MG/1
0.4 TABLET SUBLINGUAL
Status: DISCONTINUED | OUTPATIENT
Start: 2024-08-22 | End: 2024-08-27 | Stop reason: HOSPADM

## 2024-08-22 RX ORDER — ACETAMINOPHEN 10 MG/ML
1000 INJECTION, SOLUTION INTRAVENOUS EVERY 8 HOURS
Status: COMPLETED | OUTPATIENT
Start: 2024-08-22 | End: 2024-08-23

## 2024-08-22 RX ORDER — MAGNESIUM HYDROXIDE 1200 MG/15ML
LIQUID ORAL AS NEEDED
Status: DISCONTINUED | OUTPATIENT
Start: 2024-08-22 | End: 2024-08-22 | Stop reason: HOSPADM

## 2024-08-22 RX ORDER — HEPARIN SODIUM 1000 [USP'U]/ML
INJECTION, SOLUTION INTRAVENOUS; SUBCUTANEOUS AS NEEDED
Status: DISCONTINUED | OUTPATIENT
Start: 2024-08-22 | End: 2024-08-22 | Stop reason: SURG

## 2024-08-22 RX ORDER — ACETAMINOPHEN 10 MG/ML
INJECTION, SOLUTION INTRAVENOUS AS NEEDED
Status: DISCONTINUED | OUTPATIENT
Start: 2024-08-22 | End: 2024-08-22 | Stop reason: SURG

## 2024-08-22 RX ORDER — ACETAMINOPHEN 650 MG
TABLET, EXTENDED RELEASE ORAL AS NEEDED
Status: DISCONTINUED | OUTPATIENT
Start: 2024-08-22 | End: 2024-08-22 | Stop reason: HOSPADM

## 2024-08-22 RX ORDER — POLYETHYLENE GLYCOL 3350 17 G/17G
17 POWDER, FOR SOLUTION ORAL 2 TIMES DAILY
Status: DISCONTINUED | OUTPATIENT
Start: 2024-08-22 | End: 2024-08-26

## 2024-08-22 RX ORDER — NICARDIPINE HYDROCHLORIDE 2.5 MG/ML
INJECTION INTRAVENOUS AS NEEDED
Status: DISCONTINUED | OUTPATIENT
Start: 2024-08-22 | End: 2024-08-22 | Stop reason: SURG

## 2024-08-22 RX ORDER — ATORVASTATIN CALCIUM 40 MG/1
40 TABLET, FILM COATED ORAL NIGHTLY
Status: DISCONTINUED | OUTPATIENT
Start: 2024-08-23 | End: 2024-08-27 | Stop reason: HOSPADM

## 2024-08-22 RX ORDER — HYDROCODONE BITARTRATE AND ACETAMINOPHEN 5; 325 MG/1; MG/1
1 TABLET ORAL EVERY 4 HOURS PRN
Status: DISCONTINUED | OUTPATIENT
Start: 2024-08-22 | End: 2024-08-27 | Stop reason: HOSPADM

## 2024-08-22 RX ORDER — IBUPROFEN 600 MG/1
1 TABLET ORAL
Status: DISCONTINUED | OUTPATIENT
Start: 2024-08-22 | End: 2024-08-24

## 2024-08-22 RX ORDER — SODIUM CHLORIDE 0.9 % (FLUSH) 0.9 %
3-10 SYRINGE (ML) INJECTION AS NEEDED
Status: DISCONTINUED | OUTPATIENT
Start: 2024-08-22 | End: 2024-08-22 | Stop reason: HOSPADM

## 2024-08-22 RX ORDER — MEPERIDINE HYDROCHLORIDE 25 MG/ML
25 INJECTION INTRAMUSCULAR; INTRAVENOUS; SUBCUTANEOUS ONCE AS NEEDED
Status: DISCONTINUED | OUTPATIENT
Start: 2024-08-22 | End: 2024-08-23

## 2024-08-22 RX ORDER — ACETAMINOPHEN 325 MG/1
650 TABLET ORAL EVERY 4 HOURS PRN
Status: DISCONTINUED | OUTPATIENT
Start: 2024-08-22 | End: 2024-08-22 | Stop reason: HOSPADM

## 2024-08-22 RX ORDER — SODIUM CHLORIDE 9 MG/ML
INJECTION, SOLUTION INTRAVENOUS AS NEEDED
Status: DISCONTINUED | OUTPATIENT
Start: 2024-08-22 | End: 2024-08-22 | Stop reason: HOSPADM

## 2024-08-22 RX ORDER — PANTOPRAZOLE SODIUM 40 MG/1
40 TABLET, DELAYED RELEASE ORAL
Status: DISCONTINUED | OUTPATIENT
Start: 2024-08-22 | End: 2024-08-22

## 2024-08-22 RX ORDER — VECURONIUM BROMIDE 1 MG/ML
INJECTION, POWDER, LYOPHILIZED, FOR SOLUTION INTRAVENOUS AS NEEDED
Status: DISCONTINUED | OUTPATIENT
Start: 2024-08-22 | End: 2024-08-22 | Stop reason: SURG

## 2024-08-22 RX ORDER — NOREPINEPHRINE BITARTRATE 0.03 MG/ML
.02-.06 INJECTION, SOLUTION INTRAVENOUS CONTINUOUS PRN
Status: DISCONTINUED | OUTPATIENT
Start: 2024-08-22 | End: 2024-08-23

## 2024-08-22 RX ORDER — CHLORHEXIDINE GLUCONATE ORAL RINSE 1.2 MG/ML
15 SOLUTION DENTAL EVERY 12 HOURS
Status: DISCONTINUED | OUTPATIENT
Start: 2024-08-22 | End: 2024-08-27 | Stop reason: HOSPADM

## 2024-08-22 RX ORDER — ALPRAZOLAM 0.25 MG
0.25 TABLET ORAL ONCE
Status: COMPLETED | OUTPATIENT
Start: 2024-08-22 | End: 2024-08-22

## 2024-08-22 RX ORDER — AMINOCAPROIC ACID 250 MG/ML
INJECTION, SOLUTION INTRAVENOUS AS NEEDED
Status: DISCONTINUED | OUTPATIENT
Start: 2024-08-22 | End: 2024-08-22 | Stop reason: SURG

## 2024-08-22 RX ORDER — SODIUM CHLORIDE 9 MG/ML
30 INJECTION, SOLUTION INTRAVENOUS CONTINUOUS PRN
Status: DISCONTINUED | OUTPATIENT
Start: 2024-08-22 | End: 2024-08-22

## 2024-08-22 RX ORDER — ALBUMIN, HUMAN INJ 5% 5 %
12.5 SOLUTION INTRAVENOUS AS NEEDED
Status: DISCONTINUED | OUTPATIENT
Start: 2024-08-22 | End: 2024-08-23

## 2024-08-22 RX ORDER — DEXMEDETOMIDINE HYDROCHLORIDE 4 UG/ML
.2-1.5 INJECTION, SOLUTION INTRAVENOUS
Status: DISCONTINUED | OUTPATIENT
Start: 2024-08-22 | End: 2024-08-23

## 2024-08-22 RX ORDER — SODIUM CHLORIDE 0.9 % (FLUSH) 0.9 %
3 SYRINGE (ML) INJECTION EVERY 12 HOURS SCHEDULED
Status: DISCONTINUED | OUTPATIENT
Start: 2024-08-22 | End: 2024-08-22 | Stop reason: HOSPADM

## 2024-08-22 RX ORDER — CHLORHEXIDINE GLUCONATE ORAL RINSE 1.2 MG/ML
15 SOLUTION DENTAL ONCE
Status: COMPLETED | OUTPATIENT
Start: 2024-08-22 | End: 2024-08-22

## 2024-08-22 RX ORDER — ASPIRIN 325 MG
325 TABLET ORAL ONCE
Status: COMPLETED | OUTPATIENT
Start: 2024-08-22 | End: 2024-08-22

## 2024-08-22 RX ORDER — IBUPROFEN 600 MG/1
1 TABLET ORAL
Status: DISCONTINUED | OUTPATIENT
Start: 2024-08-22 | End: 2024-08-22 | Stop reason: HOSPADM

## 2024-08-22 RX ORDER — CHLORHEXIDINE GLUCONATE 500 MG/1
CLOTH TOPICAL EVERY 12 HOURS PRN
Status: DISCONTINUED | OUTPATIENT
Start: 2024-08-22 | End: 2024-08-22 | Stop reason: HOSPADM

## 2024-08-22 RX ORDER — CALCIUM GLUCONATE 20 MG/ML
2000 INJECTION, SOLUTION INTRAVENOUS ONCE
Status: COMPLETED | OUTPATIENT
Start: 2024-08-22 | End: 2024-08-22

## 2024-08-22 RX ORDER — DEXTROSE MONOHYDRATE 25 G/50ML
10-50 INJECTION, SOLUTION INTRAVENOUS
Status: DISCONTINUED | OUTPATIENT
Start: 2024-08-22 | End: 2024-08-24

## 2024-08-22 RX ORDER — SODIUM CHLORIDE 0.9 % (FLUSH) 0.9 %
30 SYRINGE (ML) INJECTION ONCE AS NEEDED
Status: DISCONTINUED | OUTPATIENT
Start: 2024-08-22 | End: 2024-08-22 | Stop reason: HOSPADM

## 2024-08-22 RX ORDER — DEXTROSE MONOHYDRATE 25 G/50ML
10-50 INJECTION, SOLUTION INTRAVENOUS
Status: DISCONTINUED | OUTPATIENT
Start: 2024-08-22 | End: 2024-08-22 | Stop reason: HOSPADM

## 2024-08-22 RX ORDER — FENTANYL CITRATE 50 UG/ML
INJECTION, SOLUTION INTRAMUSCULAR; INTRAVENOUS AS NEEDED
Status: DISCONTINUED | OUTPATIENT
Start: 2024-08-22 | End: 2024-08-22 | Stop reason: SURG

## 2024-08-22 RX ORDER — VASOPRESSIN IN DEXTROSE 5 % 20/100 ML
.02-.1 PLASTIC BAG, INJECTION (ML) INTRAVENOUS AS NEEDED
Status: DISCONTINUED | OUTPATIENT
Start: 2024-08-22 | End: 2024-08-23

## 2024-08-22 RX ORDER — ENOXAPARIN SODIUM 100 MG/ML
40 INJECTION SUBCUTANEOUS DAILY
Status: DISCONTINUED | OUTPATIENT
Start: 2024-08-23 | End: 2024-08-27 | Stop reason: HOSPADM

## 2024-08-22 RX ORDER — EPHEDRINE SULFATE 5 MG/ML
INJECTION INTRAVENOUS AS NEEDED
Status: DISCONTINUED | OUTPATIENT
Start: 2024-08-22 | End: 2024-08-22 | Stop reason: SURG

## 2024-08-22 RX ORDER — AMOXICILLIN 250 MG
2 CAPSULE ORAL 2 TIMES DAILY
Status: DISCONTINUED | OUTPATIENT
Start: 2024-08-22 | End: 2024-08-27 | Stop reason: HOSPADM

## 2024-08-22 RX ORDER — NICOTINE POLACRILEX 4 MG
15 LOZENGE BUCCAL
Status: DISCONTINUED | OUTPATIENT
Start: 2024-08-22 | End: 2024-08-22 | Stop reason: HOSPADM

## 2024-08-22 RX ORDER — SODIUM CHLORIDE 9 MG/ML
30 INJECTION, SOLUTION INTRAVENOUS CONTINUOUS
Status: DISPENSED | OUTPATIENT
Start: 2024-08-22 | End: 2024-08-25

## 2024-08-22 RX ORDER — SODIUM CHLORIDE 9 MG/ML
40 INJECTION, SOLUTION INTRAVENOUS AS NEEDED
Status: DISCONTINUED | OUTPATIENT
Start: 2024-08-22 | End: 2024-08-22 | Stop reason: HOSPADM

## 2024-08-22 RX ORDER — ACETAMINOPHEN 325 MG/1
650 TABLET ORAL EVERY 4 HOURS PRN
Status: DISCONTINUED | OUTPATIENT
Start: 2024-08-23 | End: 2024-08-27 | Stop reason: HOSPADM

## 2024-08-22 RX ORDER — NITROGLYCERIN 0.4 MG/1
0.4 TABLET SUBLINGUAL
Status: DISCONTINUED | OUTPATIENT
Start: 2024-08-22 | End: 2024-08-22 | Stop reason: HOSPADM

## 2024-08-22 RX ORDER — ASPIRIN 81 MG/1
81 TABLET ORAL DAILY
Status: DISCONTINUED | OUTPATIENT
Start: 2024-08-23 | End: 2024-08-27 | Stop reason: HOSPADM

## 2024-08-22 RX ORDER — CYCLOBENZAPRINE HCL 10 MG
5 TABLET ORAL 3 TIMES DAILY PRN
Status: DISCONTINUED | OUTPATIENT
Start: 2024-08-23 | End: 2024-08-27 | Stop reason: HOSPADM

## 2024-08-22 RX ORDER — PANTOPRAZOLE SODIUM 40 MG/10ML
40 INJECTION, POWDER, LYOPHILIZED, FOR SOLUTION INTRAVENOUS ONCE
Status: COMPLETED | OUTPATIENT
Start: 2024-08-22 | End: 2024-08-22

## 2024-08-22 RX ORDER — MAGNESIUM SULFATE HEPTAHYDRATE 500 MG/ML
INJECTION, SOLUTION INTRAMUSCULAR; INTRAVENOUS AS NEEDED
Status: DISCONTINUED | OUTPATIENT
Start: 2024-08-22 | End: 2024-08-22 | Stop reason: SURG

## 2024-08-22 RX ORDER — ONDANSETRON 2 MG/ML
INJECTION INTRAMUSCULAR; INTRAVENOUS AS NEEDED
Status: DISCONTINUED | OUTPATIENT
Start: 2024-08-22 | End: 2024-08-22 | Stop reason: SURG

## 2024-08-22 RX ORDER — FENTANYL/ROPIVACAINE/NS/PF 2-625MCG/1
15 PLASTIC BAG, INJECTION (ML) EPIDURAL
Status: COMPLETED | OUTPATIENT
Start: 2024-08-22 | End: 2024-08-22

## 2024-08-22 RX ORDER — NALOXONE HCL 0.4 MG/ML
0.4 VIAL (ML) INJECTION
Status: DISCONTINUED | OUTPATIENT
Start: 2024-08-22 | End: 2024-08-27 | Stop reason: HOSPADM

## 2024-08-22 RX ORDER — ACETAMINOPHEN 160 MG/5ML
650 SOLUTION ORAL EVERY 4 HOURS PRN
Status: DISCONTINUED | OUTPATIENT
Start: 2024-08-23 | End: 2024-08-27 | Stop reason: HOSPADM

## 2024-08-22 RX ORDER — SODIUM CHLORIDE 9 MG/ML
INJECTION, SOLUTION INTRAVENOUS CONTINUOUS PRN
Status: DISCONTINUED | OUTPATIENT
Start: 2024-08-22 | End: 2024-08-22 | Stop reason: SURG

## 2024-08-22 RX ORDER — MAGNESIUM SULFATE 1 G/100ML
1 INJECTION INTRAVENOUS EVERY 8 HOURS
Status: COMPLETED | OUTPATIENT
Start: 2024-08-22 | End: 2024-08-23

## 2024-08-22 RX ORDER — NITROGLYCERIN 20 MG/100ML
INJECTION INTRAVENOUS CONTINUOUS PRN
Status: DISCONTINUED | OUTPATIENT
Start: 2024-08-22 | End: 2024-08-22 | Stop reason: SURG

## 2024-08-22 RX ORDER — MORPHINE SULFATE 2 MG/ML
2 INJECTION, SOLUTION INTRAMUSCULAR; INTRAVENOUS
Status: DISPENSED | OUTPATIENT
Start: 2024-08-22 | End: 2024-08-25

## 2024-08-22 RX ORDER — NITROGLYCERIN 20 MG/100ML
5-50 INJECTION INTRAVENOUS CONTINUOUS PRN
Status: DISCONTINUED | OUTPATIENT
Start: 2024-08-22 | End: 2024-08-23

## 2024-08-22 RX ORDER — NICOTINE POLACRILEX 4 MG
15 LOZENGE BUCCAL
Status: DISCONTINUED | OUTPATIENT
Start: 2024-08-22 | End: 2024-08-24

## 2024-08-22 RX ORDER — GLYCOPYRROLATE 0.2 MG/ML
INJECTION INTRAMUSCULAR; INTRAVENOUS AS NEEDED
Status: DISCONTINUED | OUTPATIENT
Start: 2024-08-22 | End: 2024-08-22 | Stop reason: SURG

## 2024-08-22 RX ORDER — NOREPINEPHRINE BITARTRATE 0.03 MG/ML
INJECTION, SOLUTION INTRAVENOUS CONTINUOUS PRN
Status: DISCONTINUED | OUTPATIENT
Start: 2024-08-22 | End: 2024-08-22 | Stop reason: SURG

## 2024-08-22 RX ORDER — MIDAZOLAM HYDROCHLORIDE 1 MG/ML
INJECTION INTRAMUSCULAR; INTRAVENOUS AS NEEDED
Status: DISCONTINUED | OUTPATIENT
Start: 2024-08-22 | End: 2024-08-22 | Stop reason: SURG

## 2024-08-22 RX ADMIN — SENNOSIDES AND DOCUSATE SODIUM 2 TABLET: 50; 8.6 TABLET ORAL at 13:30

## 2024-08-22 RX ADMIN — VECURONIUM BROMIDE 2 MG: 1 INJECTION, POWDER, LYOPHILIZED, FOR SOLUTION INTRAVENOUS at 09:20

## 2024-08-22 RX ADMIN — CHLORHEXIDINE GLUCONATE, 0.12% ORAL RINSE 15 ML: 1.2 SOLUTION DENTAL at 20:15

## 2024-08-22 RX ADMIN — CEFAZOLIN 2 G: 2 INJECTION, POWDER, LYOPHILIZED, FOR SOLUTION INTRAVENOUS at 07:35

## 2024-08-22 RX ADMIN — VECURONIUM BROMIDE 10 MG: 1 INJECTION, POWDER, LYOPHILIZED, FOR SOLUTION INTRAVENOUS at 07:04

## 2024-08-22 RX ADMIN — MUPIROCIN 1 APPLICATION: 20 OINTMENT TOPICAL at 20:15

## 2024-08-22 RX ADMIN — AMINOCAPROIC ACID 10 G: 250 INJECTION, SOLUTION INTRAVENOUS at 09:36

## 2024-08-22 RX ADMIN — 0.12% CHLORHEXIDINE GLUCONATE 15 ML: 1.2 RINSE ORAL at 05:48

## 2024-08-22 RX ADMIN — INSULIN HUMAN 3 UNITS/HR: 1 INJECTION, SOLUTION INTRAVENOUS at 07:45

## 2024-08-22 RX ADMIN — INSULIN HUMAN 2.4 UNITS/HR: 1 INJECTION, SOLUTION INTRAVENOUS at 13:51

## 2024-08-22 RX ADMIN — EPHEDRINE SULFATE 10 MG: 5 INJECTION INTRAVENOUS at 08:02

## 2024-08-22 RX ADMIN — SODIUM CHLORIDE 30 ML/HR: 9 INJECTION, SOLUTION INTRAVENOUS at 11:31

## 2024-08-22 RX ADMIN — POLYETHYLENE GLYCOL 3350 17 G: 17 POWDER, FOR SOLUTION ORAL at 13:30

## 2024-08-22 RX ADMIN — FENTANYL CITRATE 250 MCG: 50 INJECTION, SOLUTION INTRAMUSCULAR; INTRAVENOUS at 07:04

## 2024-08-22 RX ADMIN — MAGNESIUM SULFATE HEPTAHYDRATE 2 G: 500 INJECTION, SOLUTION INTRAMUSCULAR; INTRAVENOUS at 09:15

## 2024-08-22 RX ADMIN — POLYETHYLENE GLYCOL 3350 17 G: 17 POWDER, FOR SOLUTION ORAL at 20:15

## 2024-08-22 RX ADMIN — METOPROLOL TARTRATE 2 MG: 1 INJECTION, SOLUTION INTRAVENOUS at 08:18

## 2024-08-22 RX ADMIN — MORPHINE SULFATE 2 MG: 2 INJECTION, SOLUTION INTRAMUSCULAR; INTRAVENOUS at 18:04

## 2024-08-22 RX ADMIN — DEXMEDETOMIDINE HYDROCHLORIDE 0.3 MCG/KG/HR: 4 INJECTION, SOLUTION INTRAVENOUS at 12:35

## 2024-08-22 RX ADMIN — Medication 25 MG: at 06:56

## 2024-08-22 RX ADMIN — DEXMEDETOMIDINE HYDROCHLORIDE 0.5 MCG/KG/HR: 100 INJECTION, SOLUTION INTRAVENOUS at 06:56

## 2024-08-22 RX ADMIN — ONDANSETRON 4 MG: 2 INJECTION INTRAMUSCULAR; INTRAVENOUS at 09:55

## 2024-08-22 RX ADMIN — MORPHINE SULFATE 2 MG: 2 INJECTION, SOLUTION INTRAMUSCULAR; INTRAVENOUS at 20:15

## 2024-08-22 RX ADMIN — MIDAZOLAM 5 MG: 1 INJECTION INTRAMUSCULAR; INTRAVENOUS at 07:04

## 2024-08-22 RX ADMIN — Medication 12.5 MG: at 05:48

## 2024-08-22 RX ADMIN — NICARDIPINE HYDROCHLORIDE 1 MG: 25 INJECTION INTRAVENOUS at 07:59

## 2024-08-22 RX ADMIN — NICARDIPINE HYDROCHLORIDE 3 MG/HR: 25 INJECTION INTRAVENOUS at 09:27

## 2024-08-22 RX ADMIN — HEPARIN SODIUM 27000 UNITS: 1000 INJECTION INTRAVENOUS; SUBCUTANEOUS at 08:14

## 2024-08-22 RX ADMIN — NITROGLYCERIN 15 MCG/MIN: 20 INJECTION INTRAVENOUS at 10:40

## 2024-08-22 RX ADMIN — ASPIRIN 325 MG ORAL TABLET 325 MG: 325 PILL ORAL at 16:46

## 2024-08-22 RX ADMIN — MUPIROCIN 1 APPLICATION: 20 OINTMENT TOPICAL at 05:48

## 2024-08-22 RX ADMIN — SENNOSIDES AND DOCUSATE SODIUM 2 TABLET: 50; 8.6 TABLET ORAL at 20:15

## 2024-08-22 RX ADMIN — MORPHINE SULFATE 2 MG: 2 INJECTION, SOLUTION INTRAMUSCULAR; INTRAVENOUS at 15:43

## 2024-08-22 RX ADMIN — ALBUMIN (HUMAN) 12.5 G: 12.5 INJECTION, SOLUTION INTRAVENOUS at 13:26

## 2024-08-22 RX ADMIN — POTASSIUM PHOSPHATE, MONOBASIC AND POTASSIUM PHOSPHATE, DIBASIC 15 MMOL: 224; 236 INJECTION, SOLUTION, CONCENTRATE INTRAVENOUS at 17:18

## 2024-08-22 RX ADMIN — AMINOCAPROIC ACID 10 G: 250 INJECTION, SOLUTION INTRAVENOUS at 08:05

## 2024-08-22 RX ADMIN — ALPRAZOLAM 0.25 MG: 0.25 TABLET ORAL at 05:48

## 2024-08-22 RX ADMIN — ACETAMINOPHEN 1000 MG: 10 INJECTION INTRAVENOUS at 09:55

## 2024-08-22 RX ADMIN — NICARDIPINE HYDROCHLORIDE 2.5 MG/HR: 25 INJECTION, SOLUTION INTRAVENOUS at 19:14

## 2024-08-22 RX ADMIN — SODIUM CHLORIDE: 9 INJECTION, SOLUTION INTRAVENOUS at 08:53

## 2024-08-22 RX ADMIN — HYDROCODONE BITARTRATE AND ACETAMINOPHEN 1 TABLET: 5; 325 TABLET ORAL at 17:18

## 2024-08-22 RX ADMIN — SODIUM CHLORIDE: 9 INJECTION, SOLUTION INTRAVENOUS at 06:50

## 2024-08-22 RX ADMIN — HYDROCODONE BITARTRATE AND ACETAMINOPHEN 1 TABLET: 5; 325 TABLET ORAL at 21:17

## 2024-08-22 RX ADMIN — POTASSIUM PHOSPHATE, MONOBASIC AND POTASSIUM PHOSPHATE, DIBASIC 15 MMOL: 224; 236 INJECTION, SOLUTION, CONCENTRATE INTRAVENOUS at 18:03

## 2024-08-22 RX ADMIN — MAGNESIUM SULFATE IN DEXTROSE 1 G: 10 INJECTION, SOLUTION INTRAVENOUS at 16:46

## 2024-08-22 RX ADMIN — CALCIUM GLUCONATE 2000 MG: 20 INJECTION, SOLUTION INTRAVENOUS at 18:42

## 2024-08-22 RX ADMIN — Medication 0.03 MCG/KG/MIN: at 08:27

## 2024-08-22 RX ADMIN — ALBUMIN (HUMAN) 12.5 G: 12.5 INJECTION, SOLUTION INTRAVENOUS at 12:09

## 2024-08-22 RX ADMIN — PROTAMINE SULFATE 300 MG: 10 INJECTION, SOLUTION INTRAVENOUS at 09:30

## 2024-08-22 RX ADMIN — VECURONIUM BROMIDE 4 MG: 1 INJECTION, POWDER, LYOPHILIZED, FOR SOLUTION INTRAVENOUS at 08:29

## 2024-08-22 RX ADMIN — SODIUM CHLORIDE 2 G: 900 INJECTION INTRAVENOUS at 16:47

## 2024-08-22 RX ADMIN — FENTANYL CITRATE 500 MCG: 50 INJECTION, SOLUTION INTRAMUSCULAR; INTRAVENOUS at 07:56

## 2024-08-22 RX ADMIN — PANTOPRAZOLE SODIUM 40 MG: 40 INJECTION, POWDER, FOR SOLUTION INTRAVENOUS at 16:47

## 2024-08-22 RX ADMIN — HYDROCODONE BITARTRATE AND ACETAMINOPHEN 1 TABLET: 5; 325 TABLET ORAL at 13:31

## 2024-08-22 RX ADMIN — INSULIN HUMAN 3 UNITS/HR: 1 INJECTION, SOLUTION INTRAVENOUS at 07:50

## 2024-08-22 RX ADMIN — ACETAMINOPHEN 1000 MG: 10 INJECTION INTRAVENOUS at 16:47

## 2024-08-22 RX ADMIN — VECURONIUM BROMIDE 6 MG: 1 INJECTION, POWDER, LYOPHILIZED, FOR SOLUTION INTRAVENOUS at 07:59

## 2024-08-22 RX ADMIN — CEFAZOLIN 2 G: 2 INJECTION, POWDER, LYOPHILIZED, FOR SOLUTION INTRAVENOUS at 09:28

## 2024-08-22 RX ADMIN — FENTANYL CITRATE 250 MCG: 50 INJECTION, SOLUTION INTRAMUSCULAR; INTRAVENOUS at 08:17

## 2024-08-22 RX ADMIN — VECURONIUM BROMIDE 3 MG: 1 INJECTION, POWDER, LYOPHILIZED, FOR SOLUTION INTRAVENOUS at 09:03

## 2024-08-22 RX ADMIN — Medication 25 MG: at 07:04

## 2024-08-22 RX ADMIN — GLYCOPYRROLATE 0.4 MCG: 0.2 INJECTION INTRAMUSCULAR; INTRAVENOUS at 08:02

## 2024-08-22 RX ADMIN — MIDAZOLAM 5 MG: 1 INJECTION INTRAMUSCULAR; INTRAVENOUS at 06:56

## 2024-08-22 NOTE — ANESTHESIA PROCEDURE NOTES
Arterial Line      Patient reassessed immediately prior to procedure    Start time: 8/22/2024 6:54 AM  Stop Time:8/22/2024 6:59 AM       Line placed for hemodynamic monitoring and ABGs/Labs/ISTAT.  Performed By   Anesthesiologist: Jethro Swan MD   Preanesthetic Checklist  Completed: patient identified, IV checked, site marked, risks and benefits discussed, surgical consent, monitors and equipment checked, pre-op evaluation and timeout performed  Arterial Line Prep    Sterile Tech: cap, gloves, sterile barriers, gown and mask  Prep: ChloraPrep  Patient monitoring: EKG, continuous pulse oximetry and blood pressure monitoring  Arterial Line Procedure   Laterality:left  Location:  radial artery  Catheter size: 20 G   Guidance: palpation technique  Number of attempts: 1  Successful placement: yes Images: still images not obtained  Post Assessment   Dressing Type: wrist guard applied, secured with tape and occlusive dressing applied.   Complications no  Circ/Move/Sens Assessment: normal and unchanged.   Patient Tolerance: patient tolerated the procedure well with no apparent complications  Additional Notes  Sterile seldinger technique, tolerated well

## 2024-08-22 NOTE — ANESTHESIA PROCEDURE NOTES
Central Line      Start time: 8/22/2024 7:15 AM  Stop Time:8/22/2024 7:30 AM  Staff  Anesthesiologist: Jethro Swan MD  Preanesthetic Checklist  Completed: patient identified, IV checked, site marked, risks and benefits discussed, surgical consent, monitors and equipment checked, pre-op evaluation and timeout performed  Central Line Prep  Sterile Tech:gloves, cap, gown, mask and sterile barriers  Prep: chloraprep  Patient monitoring: blood pressure monitoring, continuous pulse oximetry and EKG  Central Line Procedure  Laterality:right  Location:internal jugular  Catheter Type:Cordis  Catheter Size:9 Fr  Guidance:ultrasound guided, landmark technique and palpation technique  PROCEDURE NOTE/ULTRASOUND INTERPRETATION.  Using ultrasound guidance the potential vascular sites for insertion of the catheter were visualized to determine the patency of the vessel to be used for vascular access.  After selecting the appropriate site for insertion, the needle was visualized under ultrasound being inserted into the internal jugular vein, followed by ultrasound confirmation of wire and catheter placement. There were no abnormalities seen on ultrasound; an image was taken; and the patient tolerated the procedure with no complications. Images: still images not obtained  Assessment  Post procedure:biopatch applied, line sutured and occlusive dressing applied  Assessement:blood return through all ports, free fluid flow, chest x-ray ordered and Humberto Test  Complications:no  Patient Tolerance:patient tolerated the procedure well with no apparent complications  Additional Notes  Sterile prep, drape, gown and gloves.  Negative humberto negative carotid, no difficulties.  Tolerated well.

## 2024-08-22 NOTE — ANESTHESIA PROCEDURE NOTES
Central Line      Patient reassessed immediately prior to procedure    Staff  Anesthesiologist: Jethro Swan MD  Preanesthetic Checklist  Completed: patient identified, IV checked, site marked, risks and benefits discussed, surgical consent, monitors and equipment checked, pre-op evaluation and timeout performed  Central Line Prep  Sterile Tech:gloves, cap, gown, mask and sterile barriers  Prep: chloraprep  Patient monitoring: blood pressure monitoring, continuous pulse oximetry and EKG  Central Line Procedure  Laterality:right  Location:internal jugular  Catheter Type:[place]-Alan  Catheter Size:9 Fr  Guidance:landmark technique and palpation technique  Assessment  Post procedure:biopatch applied, line sutured and occlusive dressing applied  Assessement:blood return through all ports, free fluid flow, chest x-ray ordered and Humberto Test  Complications:no  Patient Tolerance:patient tolerated the procedure well with no apparent complications  Additional Notes  Floated 1 attempt no wedge no resistance

## 2024-08-22 NOTE — CONSULTS
CARDIOLOGY CONSULT      Requesting Provider    Carlos Alex MD      PATIENT IDENTIFICATION    Name: Sami Quiroga  Age: 59 y.o. Sex: male : 1965  MRN: [id number]    REASON FOR CONSULTATION:  Valvular heart disease    CHIEF COMPLAINT:  Valvular heart disease  Ascending aortic aneurysm    HISTORY OF PRESENT ILLNESS:   This is a 58-year-old male with no established history of ischemic heart disease.  He does have a history of an ascending aortic aneurysm as well as aortic insufficiency, hyperlipidemia, and hypertension.  He presents to the hospital for elective ascending aortic aneurysm repair and aortic valve repair.  He is seen immediately postop on the ventilator.    The patient underwent an ascending aortic aneurysm repair with aortic valve repair today.    IMPRESSIONS  Aortic insufficiency with ascending aortic aneurysm status post ascending aortic aneurysm repair and aortic valve repair this admission  Hypertension  Hyperlipidemia    RECOMMENDATIONS:  Continue with routine postoperative care.  Wean ventilator as per protocol  Increase activity as tolerated.    Medical History    Past Medical History:   Diagnosis Date    Aortic insufficiency due to bicuspid aortic valve     Ascending aortic aneurysm     Bicuspid aortic valve     Heart murmur     Hyperlipidemia     Hypertension         Surgical History    Past Surgical History:   Procedure Laterality Date    APPENDECTOMY      CARDIAC CATHETERIZATION Right 2024    Procedure: Left Heart Cath, RADIAL;  Surgeon: Brittanie Lopez MD;  Location: [facility] CATH INVASIVE LOCATION;  Service: Cardiovascular;  Laterality: Right;  cath in 2024        Family History    Family History   Problem Relation Age of Onset    Hypertension Mother     Hypertension Father        Social History    Social History     Tobacco Use    Smoking status: Former     Types: Cigarettes    Smokeless tobacco: Never   Substance Use Topics    Alcohol use: Yes     Comment:  "Rare         Allergies    No Known Allergies    REVIEW OF SYSTEMS:  Review of systems could not be obtained due to   patient intubated.    OBJECTIVE     VITAL SIGNS:  Visit Vitals  /83   Pulse 80   Temp 97.7 °F (36.5 °C)   Resp 16   Ht 180.3 cm (70.98\")   Wt 88.5 kg (195 lb 1.7 oz)   SpO2 98%   BMI 27.22 kg/m²     Oxygen Therapy  SpO2: 98 %  Pulse Oximetry Type: Continuous  Device (Oxygen Therapy): ventilator  Oxygen Concentration (%): 40  Flowsheet Rows      Flowsheet Row First Filed Value   Admission Height 180.3 cm (70.98\") Documented at 08/22/2024 0540   Admission Weight 88.5 kg (195 lb 1.7 oz) Documented at 08/22/2024 0540          Intake & Output (last 3 days)         08/19 0701  08/20 0700 08/20 0701  08/21 0700 08/21 0701  08/22 0700 08/22 0701  08/23 0700    P.O.    0    I.V. (mL/kg)    1500 (16.9)    Blood    226    IV Piggyback    550    Total Intake(mL/kg)    2276 (25.7)    Urine (mL/kg/hr)    1675 (2.1)    Blood    250    Chest Tube    80    Total Output    2005    Net    +271                  Lines, Drains & Airways       Active LDAs       Name Placement date Placement time Site Days    Pulmonary Artery Catheter - Triple Lumen 08/22/24 Right Internal jugular 08/22/24  0848  created via procedure documentation  -- less than 1    CVC Double Lumen 08/22/24 Right Internal jugular 08/22/24  0715  created via procedure documentation  Internal jugular  less than 1    Peripheral IV 08/22/24 0542 Posterior;Right Hand 08/22/24  0542  Hand  less than 1    Urethral Catheter Temperature probe 16 Fr. 08/22/24  0710  -- less than 1    Y Chest Tube 1 and 2 1 Mediastinal 28 Fr. 2 Mediastinal 28 Fr. 08/22/24  0923  -- less than 1    ETT  08/22/24  0707  created via procedure documentation  -- less than 1    Arterial Line 08/22/24 Left Radial 08/22/24  0654  created via procedure documentation  Radial  less than 1    Pacer Wires 08/22/24  --  Atrial and Ventricular  less than 1                  /83   Pulse " "80   Temp 97.7 °F (36.5 °C)   Resp 16   Ht 180.3 cm (70.98\")   Wt 88.5 kg (195 lb 1.7 oz)   SpO2 98%   BMI 27.22 kg/m²     INTAKE/OUTPUT:    Intake/Output this shift:  I/O this shift:  In: 2276 [I.V.:1500; Blood:226; IV Piggyback:550]  Out: 2005 [Urine:1675; Blood:250; Chest Tube:80]  Intake/Output last 3 shifts:  No intake/output data recorded.      PHYSICAL EXAM:    General: Somnolent but arousable.  Cooperative, no distress, appears stated age  Head:  Normocephalic, atraumatic, mucous membranes moist  Eyes:  Conjunctivae/corneas clear, EOM's intact     Neck:  Supple,  no adenopathy;      Lungs: Coarse and mechanical  Chest wall: No tenderness  Heart::  Regular rate and rhythm, S1 and S2 normal, 1/6 systolic ejection murmur.  Rub or gallop  Abdomen: Soft, nontender, nondistended, bowel sounds active  Extremities: No cyanosis, clubbing, or edema  Pulses: 2+ and symmetric all extremities  Skin:  No rashes or lesions  Neuro/psych: Somnolent but arousable.  On ventilator.    Scheduled Meds:      acetaminophen, 1,000 mg, Intravenous, Q8H  [START ON 8/23/2024] aspirin, 81 mg, Oral, Daily  aspirin, 325 mg, Oral, Once  [START ON 8/23/2024] atorvastatin, 40 mg, Oral, Nightly  ceFAZolin, 2 g, Intravenous, Q8H  chlorhexidine, 15 mL, Mouth/Throat, Q12H  [START ON 8/23/2024] enoxaparin, 40 mg, Subcutaneous, Daily  magnesium sulfate, 1 g, Intravenous, Q8H  mupirocin, , Each Nare, BID  [START ON 8/23/2024] pantoprazole, 40 mg, Oral, Q AM  pantoprazole, 40 mg, Intravenous, Once  polyethylene glycol, 17 g, Oral, BID  potassium phosphate, 15 mmol, Intravenous, Q1H  senna-docusate sodium, 2 tablet, Oral, BID        Continuous Infusions:    dexmedetomidine, 0.2-1.5 mcg/kg/hr, Last Rate: Stopped (08/22/24 1500)  insulin, 0-100 Units/hr, Last Rate: 1.6 Units/hr (08/22/24 4645)  niCARdipine, 5-15 mg/hr, Last Rate: Stopped (08/22/24 1328)  nitroglycerin, 5-50 mcg/min  norepinephrine, 0.02-0.06 mcg/kg/min  sodium chloride, 30 mL/hr, " Last Rate: 30 mL/hr (08/22/24 1131)        PRN Meds:      [START ON 8/23/2024] acetaminophen **OR** [START ON 8/23/2024] acetaminophen **OR** [START ON 8/23/2024] acetaminophen    albumin human    Calcium Replacement - Follow Nurse / BPA Driven Protocol    [START ON 8/23/2024] cyclobenzaprine    dextrose    dextrose    glucagon (human recombinant)    HYDROcodone-acetaminophen    Magnesium Cardiology Dose Replacement - Follow Nurse / BPA Driven Protocol    meperidine    Morphine **AND** naloxone    niCARdipine    nitroglycerin    nitroglycerin    norepinephrine    ondansetron    Phosphorus Replacement - Follow Nurse / BPA Driven Protocol    Potassium Replacement - Follow Nurse / BPA Driven Protocol    vasopressin     Data Review:     X-rays, labs reviewed personally by provider.    CBC    Results from last 7 days   Lab Units 08/22/24  1525 08/22/24  1443 08/22/24  1339 08/22/24  1238 08/22/24  1143 08/22/24  1137 08/22/24  1112 08/22/24  0947 08/22/24  0942 08/22/24  0905 08/22/24  0856 08/22/24  0740 08/21/24  0744   WBC 10*3/mm3  --   --   --   --   --   --  14.14*  --  12.20*  --  12.26*  --  6.62   HEMOGLOBIN g/dL  --   --   --   --   --   --  12.1*  --  11.1*  --  11.1*  --  14.7   HEMOGLOBIN, POC g/dL 11.9* 11.5* 12.4 12.2 11.7* 12.3  --    < >  --    < >  --    < >  --    PLATELETS 10*3/mm3  --   --   --   --   --   --  127*  --  107*  --  194  --  184    < > = values in this interval not displayed.     Cr Clearance Estimated Creatinine Clearance: 117.2 mL/min (by C-G formula based on SCr of 0.86 mg/dL).  Coag   Results from last 7 days   Lab Units 08/22/24  1112 08/22/24  0942 08/21/24  0744   INR  1.11* 1.34* 0.98   APTT seconds 22.9* 21.1* 25.0     HbA1C   Lab Results   Component Value Date    HGBA1C 6.36 (H) 08/21/2024    HGBA1C 5.7 (H) 10/18/2022    HGBA1C 5.8 (H) 10/05/2020     Blood Glucose   Glucose   Date/Time Value Ref Range Status   08/22/2024 1525 141 (H) 74 - 100 mg/dL Final   08/22/2024 1525 141  (H) 74 - 100 mg/dL Final     Comment:     Serial Number: [id number]:  [id number]   08/22/2024 1443 149 (H) 74 - 100 mg/dL Final   08/22/2024 1443 149 (H) 74 - 100 mg/dL Final     Comment:     Serial Number: [id number]:  [id number]   08/22/2024 1339 178 (H) 74 - 100 mg/dL Final   08/22/2024 1339 178 (H) 74 - 100 mg/dL Final     Comment:     Serial Number: [id number]:  [id number]   08/22/2024 1238 205 (H) 74 - 100 mg/dL Final   08/22/2024 1238 205 (H) 74 - 100 mg/dL Final     Comment:     Serial Number: [id number]:  [id number]     Infection     CMP   Results from last 7 days   Lab Units 08/22/24  1443 08/22/24  1339 08/22/24  1238 08/22/24  1143 08/22/24  1137 08/22/24  1112 08/21/24  0744   SODIUM mmol/L  --   --   --   --   --  139 141   POTASSIUM mmol/L  --   --   --   --   --  4.4 4.1   CHLORIDE mmol/L  --   --   --   --   --  107 102   CO2 mmol/L  --   --   --   --   --  21.4* 26.6   BUN mg/dL  --   --   --   --   --  11 15   CREATININE mg/dL 0.86 0.86 0.84 0.76 0.91 0.96 0.98   GLUCOSE mg/dL  --   --   --   --   --  111* 132*   ALBUMIN g/dL  --   --   --   --   --  4.5 4.8   BILIRUBIN mg/dL  --   --   --   --   --   --  0.9   ALK PHOS U/L  --   --   --   --   --   --  83   AST (SGOT) U/L  --   --   --   --   --   --  32   ALT (SGPT) U/L  --   --   --   --   --   --  42*     ABG    Results from last 7 days   Lab Units 08/22/24  1525 08/22/24  1443 08/22/24  1339 08/22/24  1238 08/22/24  1143 08/22/24  1137 08/22/24  0947 08/22/24  0905   PH, ARTERIAL pH units 7.352 7.393 7.433 7.425  --  7.398 7.250* 7.280*   PCO2, ARTERIAL mm Hg 41.3 37.0 31.2* 32.0*  --  36.9 54.6* 54.1*   PO2 ART mm Hg 145.6* 131.2* 134.0* 158.9*  --  133.9* 383.0* 380.0*   O2 SATURATION ART % 99.1* 99.0* 99.2* 99.5*  --  99.1* 100.0* 100.0*   BASE EXCESS ART mmol/L -2.6* -2.0* -2.5* -2.7* -3.1* -1.8* <0.0* <0.0*     UA    Results from last 7 days   Lab Units 08/21/24  0738   NITRITE UA  Negative   WBC UA /HPF 0-2   BACTERIA UA /HPF None Seen  "  SQUAM EPITHEL UA /HPF 0-2     JOSE ANTONIO  No results found for: \"POCMETH\", \"POCAMPHET\", \"POCBARBITUR\", \"POCBENZO\", \"POCCOCAINE\", \"POCOPIATES\", \"POCOXYCODO\", \"POCPHENCYC\", \"POCPROPOXY\", \"POCTHC\", \"POCTRICYC\"  Lysis Labs   Results from last 7 days   Lab Units 08/22/24  1525 08/22/24  1443 08/22/24  1339 08/22/24  1238 08/22/24  1143 08/22/24  1137 08/22/24  1112 08/22/24  0947 08/22/24  0942 08/22/24  0905 08/22/24  0856 08/22/24  0740 08/21/24  0744   INR   --   --   --   --   --   --  1.11*  --  1.34*  --   --   --  0.98   APTT seconds  --   --   --   --   --   --  22.9*  --  21.1*  --   --   --  25.0   FIBRINOGEN mg/dL  --   --   --   --   --   --   --   --  211  --  214  --   --    HEMOGLOBIN g/dL  --   --   --   --   --   --  12.1*  --  11.1*  --  11.1*  --  14.7   HEMOGLOBIN, POC g/dL 11.9* 11.5* 12.4 12.2 11.7* 12.3  --    < >  --    < >  --    < >  --    PLATELETS 10*3/mm3  --   --   --   --   --   --  127*  --  107*  --  194  --  184   CREATININE mg/dL  --  0.86 0.86 0.84 0.76 0.91 0.96  --   --   --   --   --  0.98    < > = values in this interval not displayed.     Radiology(recent) XR Chest 1 View    Result Date: 8/22/2024  Impression: 1.Lines and tubes as noted. 2.Atelectatic change right lower chest. There is some prominence of markings left infrahilar area that might reflect some atelectasis in this area. Electronically Signed: Rodrigo Bhatia MD  8/22/2024 11:42 AM EDT  Workstation ID: [id number]    XR Chest PA & Lateral    Result Date: 8/21/2024  Impression: No acute process. Electronically Signed: Wil Rodriguez MD  8/21/2024 4:30 PM EDT  Workstation ID: [id number]             ECG/EMG Results (most recent)       Procedure Component Value Units Date/Time    ECG 12 Lead Other; s/p cardiac surgery [[id number]] Collected: 08/22/24 1312     Updated: 08/22/24 1313     QT Interval 429 ms      QTC Interval 444 ms     Narrative:      HEART RATE=64  bpm  RR Hprwstxz=588  ms  OR Nxnwtqdx=685  ms  P Horizontal Axis=45  deg  P " Front Axis=57  deg  QRSD Interval=84  ms  QT Tvsxkmjm=781  ms  PReC=094  ms  QRS Axis=46  deg  T Wave Axis=21  deg  - NORMAL ECG -  Sinus rhythm  Date and Time of Study:2024-08-22 13:12:04            Imaging:  Imaging Results (Last 72 Hours)       Procedure Component Value Units Date/Time    XR Chest 1 View [[id number]] Collected: 08/22/24 1140     Updated: 08/22/24 1144    Narrative:      XR CHEST 1 VW    Date of Exam: 8/22/2024 11:39 AM EDT    Indication: Post-Op Check Line & Tube Placement    Comparison: August 21, 2024    Findings:  ET tube tip is at level of the clavicles. There is a [place]-Alan catheter with tip in the pulmonary outflow tract. There is a nasogastric tube noted with its tip at least within the stomach. There is left atrial appendage clip. There is some atelectasis or   scarring in the right lower chest. There is slight prominence of markings in the left perihilar region which could reflect shadows from pulmonary vessels accentuated secondary to technique and depth of inspiration. Some atelectasis in this area not   excluded. There is no definite pneumothorax.      Impression:      Impression:  1.Lines and tubes as noted.  2.Atelectatic change right lower chest. There is some prominence of markings left infrahilar area that might reflect some atelectasis in this area.      Electronically Signed: Rodrigo Bhatia MD    8/22/2024 11:42 AM EDT    Workstation ID: [id number]              Yandel Alcazar DO  08/22/24  16:13 EDT

## 2024-08-22 NOTE — ANESTHESIA POSTPROCEDURE EVALUATION
Patient: Sami Quiroga    Procedure Summary       Date: 08/22/24 Room / Location: [facility] CVOR 01 / [facility] CVOR    Anesthesia Start: 0650 Anesthesia Stop: 1101    Procedure: ASCENDING AORTIC ARCH replaced with 30mm gelweave graft SN [id number], left atrial appendage exclusion with 35mm atriclip,  with intraoperative CHACE (Chest) Diagnosis:       Nonrheumatic aortic valve insufficiency      Aneurysm of ascending aorta without rupture      (Nonrheumatic aortic valve insufficiency [I35.1])      (Aneurysm of ascending aorta without rupture [I71.21])    Surgeons: Carlos Alex MD Provider: Jethro Swan MD    Anesthesia Type: general, Sheri, CVL, PAC ASA Status: 4            Anesthesia Type: general, Sheri, CVL, PAC    Vitals  Vitals Value Taken Time   BP     Temp 96.8 °F (36 °C) 08/22/24 1244   Pulse 68 08/22/24 1244   Resp 14 08/22/24 1110   SpO2 98 % 08/22/24 1244   Vitals shown include unfiled device data.        Post Anesthesia Care and Evaluation    Patient location during evaluation: ICU  Patient participation: complete - patient cannot participate  Level of consciousness: obtunded/minimal responses  Pain scale: See nurse's notes for pain score.  Pain management: adequate    Airway patency: patent  Anesthetic complications: No anesthetic complications  PONV Status: none  Cardiovascular status: acceptable  Respiratory status: acceptable, ventilator and ETT  Hydration status: acceptable    Comments: Patient seen and examined postoperatively; vital signs stable; SpO2 greater than or equal to 90%; cardiopulmonary status stable; nausea/vomiting adequately controlled; pain adequately controlled; no apparent anesthesia complications; patient discharged from anesthesia care when discharge criteria were met

## 2024-08-22 NOTE — ANESTHESIA PROCEDURE NOTES
Airway  Urgency: elective    Date/Time: 8/22/2024 7:07 AM  Airway not difficult    General Information and Staff    Patient location during procedure: OR  Anesthesiologist: Jethro Swan MD    Indications and Patient Condition  Indications for airway management: airway protection    Preoxygenated: yes  MILS not maintained throughout  Mask difficulty assessment: 1 - vent by mask    Final Airway Details  Final airway type: endotracheal airway      Successful airway: ETT  Cuffed: yes   Successful intubation technique: direct laryngoscopy  Facilitating devices/methods: anterior pressure/BURP  Endotracheal tube insertion site: oral  Blade: Wagner  Blade size: 4  ETT size (mm): 7.5  Cormack-Lehane Classification: grade III - view of epiglottis only  Placement verified by: capnometry and palpation of cuff   Measured from: lips  ETT/EBT  to lips (cm): 21  Number of attempts at approach: 1  Assessment: lips, teeth, and gum same as pre-op and atraumatic intubation    Additional Comments  ASA monitors applied; preoxygenated with 100% FiO2 via anesthesia face mask; induction of general anesthesia; bag-mask ventilation; patient's position optimized; laryngoscopy; thyroid cartilage pressure applied to enhance view of glottis; cuffed ETT lubricated and correctly placed into the trachea; cuff inflated to seal with minimally occlusive airway cuff pressure; ETT connected to anesthesia circuit; atraumatic/dentition in preoperative condition; ETT well secured in place; correct placement in the trachea confirmed by bilateral chest rise, tube condensation, and return of EtCO2 > 30 mmHg x3

## 2024-08-22 NOTE — ANESTHESIA PROCEDURE NOTES
Intra-Op Anesthesia CHACE    Procedure Performed: Intra-Op Anesthesia CHACE       Start Time:        End Time:      Preanesthesia Checklist:  Patient identified, IV assessed, risks and benefits discussed, monitors and equipment assessed, procedure being performed at surgeon's request and anesthesia consent obtained.    General Procedure Information  Diagnostic Indications for Echo:  assessment of surgical repair and hemodynamic monitoring  Location performed:  OR  Intubated  Bite block placed  Heart visualized  Probe Insertion:  Easy  Probe Type:  Biplane and multiplane  Modalities:  Color flow mapping, pulse wave Doppler and continuous wave Doppler    Echocardiographic and Doppler Measurements    Ventricles    Right Ventricle:  Cavity size normal.  Hypertrophy not present.  Thrombus not present.  Global function normal.  Ejection Fraction 60%.    Left Ventricle:  Cavity size normal.  Thrombus not present.  Global Function normal.  Ejection Fraction 60%.      Ventricular Regional Function:  1- Basal Anteroseptal:  normal  2- Basal Anterior:  normal  3- Basal Anterolateral:  normal  4- Basal Inferolateral:  normal  5- Basal Inferior:  normal  6- Basal Inferoseptal:  normal  7- Mid Anteroseptal:  normal  8- Mid Anterior:  normal  9- Mid Anterolateral:  normal  10- Mid Inferolateral:  normal  11- Mid Inferior:  normal  12- Mid Inferoseptal:  normal  13- Apical Anterior:  normal  14- Apical Lateral:  normal  15- Apical Inferior:  normal  16- Apical Septal:  normal  17- [place]:  normal      Valves    Aortic Valve:  Annulus normal.  Stenosis mild.  Regurgitation absent.  Leaflets bicuspid.  Leaflet motions restricted.      Mitral Valve:  Annulus normal.  Stenosis not present.  Regurgitation absent.  Leaflets normal.  Leaflet motions normal.      Tricuspid Valve:  Annulus normal.  Stenosis not present.  Regurgitation absent.  Leaflets normal.  Leaflet motions normal.    Pulmonic Valve:  Annulus normal.  Stenosis not present.   Regurgitation absent.        Aorta    Ascending Aorta:  Size normal.  Dissection not present.  Plaque thickness less than 3 mm.  Mobile plaque not present.    Aortic Arch:  Size normal.  Dissection not present.  Plaque thickness less than 3 mm.  Mobile plaque not present.    Descending Aorta:  Size normal.  Dissection not present.  Plaque thickness less than 3 mm.  Mobile plaque not present.        Atria    Right Atrium:  Size normal.    Left Atrium:  Size dilated.  Spontaneous echo contrast not present.  Thrombus not present.  Tumor not present.  Device not present.    Left atrial appendage normal.      Septa    Atrial Septum:  Intra-atrial septal morphology contains patent foramen ovale.  Patent foramen ovale shunts left to right.      Ventricular Septum:  Intra-ventricular septum morphology normal.          Other Findings  Pericardium:  normal  Pleural Effusion:  none  Pulmonary Arteries:  normal      Anesthesia Information  Performed Personally  Anesthesiologist:  Jethro Swan MD      Echocardiogram Comments:       CHACE placed without resistance, lubricated, bite guard       Pre cpb CHACE   LV no WMA EF 60  RV nl SF   MV wnl   TV wnl   RA dilated   AV bicuspid mild eccentric AI (toward AML) . Dilated aortic root 3.9 CM STJ 4cm asc aorta 4.4 cm taper to nl in distal arch       Post cpb AV repair, asc aorta replace   LV RV no change   MV TV no change   AV bicuspid repair, no AI

## 2024-08-23 ENCOUNTER — APPOINTMENT (OUTPATIENT)
Dept: GENERAL RADIOLOGY | Facility: HOSPITAL | Age: 59
DRG: 220 | End: 2024-08-23
Payer: COMMERCIAL

## 2024-08-23 PROBLEM — I71.21 ANEURYSM OF ASCENDING AORTA: Status: ACTIVE | Noted: 2024-08-23

## 2024-08-23 LAB
ALBUMIN SERPL-MCNC: 4.4 G/DL (ref 3.5–5.2)
ANION GAP SERPL CALCULATED.3IONS-SCNC: 10.9 MMOL/L (ref 5–15)
BASOPHILS # BLD AUTO: 0.02 10*3/MM3 (ref 0–0.2)
BASOPHILS NFR BLD AUTO: 0.1 % (ref 0–1.5)
BH BB BLOOD EXPIRATION DATE: NORMAL
BH BB BLOOD TYPE BARCODE: 6200
BH BB DISPENSE STATUS: NORMAL
BH BB PRODUCT CODE: NORMAL
BH BB UNIT NUMBER: NORMAL
BUN SERPL-MCNC: 14 MG/DL (ref 6–20)
BUN/CREAT SERPL: 15.4 (ref 7–25)
CA-I SERPL ISE-MCNC: 1.12 MMOL/L (ref 1.15–1.3)
CALCIUM SPEC-SCNC: 8.5 MG/DL (ref 8.6–10.5)
CHLORIDE SERPL-SCNC: 104 MMOL/L (ref 98–107)
CO2 SERPL-SCNC: 21.1 MMOL/L (ref 22–29)
CREAT SERPL-MCNC: 0.91 MG/DL (ref 0.76–1.27)
DEPRECATED RDW RBC AUTO: 42 FL (ref 37–54)
EGFRCR SERPLBLD CKD-EPI 2021: 97.7 ML/MIN/1.73
EOSINOPHIL # BLD AUTO: 0 10*3/MM3 (ref 0–0.4)
EOSINOPHIL NFR BLD AUTO: 0 % (ref 0.3–6.2)
ERYTHROCYTE [DISTWIDTH] IN BLOOD BY AUTOMATED COUNT: 12.9 % (ref 12.3–15.4)
GLUCOSE BLDC GLUCOMTR-MCNC: 149 MG/DL (ref 70–105)
GLUCOSE BLDC GLUCOMTR-MCNC: 153 MG/DL (ref 70–105)
GLUCOSE BLDC GLUCOMTR-MCNC: 158 MG/DL (ref 70–105)
GLUCOSE BLDC GLUCOMTR-MCNC: 159 MG/DL (ref 70–105)
GLUCOSE BLDC GLUCOMTR-MCNC: 165 MG/DL (ref 70–105)
GLUCOSE BLDC GLUCOMTR-MCNC: 167 MG/DL (ref 70–105)
GLUCOSE BLDC GLUCOMTR-MCNC: 177 MG/DL (ref 70–105)
GLUCOSE BLDC GLUCOMTR-MCNC: 177 MG/DL (ref 70–105)
GLUCOSE BLDC GLUCOMTR-MCNC: 178 MG/DL (ref 70–105)
GLUCOSE BLDC GLUCOMTR-MCNC: 179 MG/DL (ref 70–105)
GLUCOSE BLDC GLUCOMTR-MCNC: 180 MG/DL (ref 70–105)
GLUCOSE BLDC GLUCOMTR-MCNC: 181 MG/DL (ref 70–105)
GLUCOSE BLDC GLUCOMTR-MCNC: 188 MG/DL (ref 70–105)
GLUCOSE BLDC GLUCOMTR-MCNC: 196 MG/DL (ref 70–105)
GLUCOSE SERPL-MCNC: 162 MG/DL (ref 65–99)
HCT VFR BLD AUTO: 35.6 % (ref 37.5–51)
HGB BLD-MCNC: 11.4 G/DL (ref 13–17.7)
IMM GRANULOCYTES # BLD AUTO: 0.09 10*3/MM3 (ref 0–0.05)
IMM GRANULOCYTES NFR BLD AUTO: 0.6 % (ref 0–0.5)
INR PPP: 1.05 (ref 0.93–1.1)
LAB AP CASE REPORT: NORMAL
LYMPHOCYTES # BLD AUTO: 0.76 10*3/MM3 (ref 0.7–3.1)
LYMPHOCYTES NFR BLD AUTO: 5.3 % (ref 19.6–45.3)
MAGNESIUM SERPL-MCNC: 2.9 MG/DL (ref 1.6–2.6)
MCH RBC QN AUTO: 28.4 PG (ref 26.6–33)
MCHC RBC AUTO-ENTMCNC: 32 G/DL (ref 31.5–35.7)
MCV RBC AUTO: 88.6 FL (ref 79–97)
MONOCYTES # BLD AUTO: 1.13 10*3/MM3 (ref 0.1–0.9)
MONOCYTES NFR BLD AUTO: 7.8 % (ref 5–12)
NEUTROPHILS NFR BLD AUTO: 12.4 10*3/MM3 (ref 1.7–7)
NEUTROPHILS NFR BLD AUTO: 86.2 % (ref 42.7–76)
NRBC BLD AUTO-RTO: 0 /100 WBC (ref 0–0.2)
PATH REPORT.FINAL DX SPEC: NORMAL
PATH REPORT.GROSS SPEC: NORMAL
PHOSPHATE SERPL-MCNC: 4.9 MG/DL (ref 2.5–4.5)
PLATELET # BLD AUTO: 143 10*3/MM3 (ref 140–450)
PMV BLD AUTO: 10 FL (ref 6–12)
POTASSIUM SERPL-SCNC: 4 MMOL/L (ref 3.5–5.2)
PROTHROMBIN TIME: 11.4 SECONDS (ref 9.6–11.7)
RBC # BLD AUTO: 4.02 10*6/MM3 (ref 4.14–5.8)
SODIUM SERPL-SCNC: 136 MMOL/L (ref 136–145)
TROPONIN T SERPL HS-MCNC: 215 NG/L
UNIT  ABO: NORMAL
UNIT  RH: NORMAL
WBC NRBC COR # BLD AUTO: 14.4 10*3/MM3 (ref 3.4–10.8)

## 2024-08-23 PROCEDURE — 25010000002 CALCIUM GLUCONATE 2-0.675 GM/100ML-% SOLUTION: Performed by: NURSE PRACTITIONER

## 2024-08-23 PROCEDURE — 85610 PROTHROMBIN TIME: CPT | Performed by: NURSE PRACTITIONER

## 2024-08-23 PROCEDURE — 25810000003 SODIUM CHLORIDE 0.9 % SOLUTION 250 ML FLEX CONT: Performed by: NURSE PRACTITIONER

## 2024-08-23 PROCEDURE — 25010000002 ENOXAPARIN PER 10 MG: Performed by: NURSE PRACTITIONER

## 2024-08-23 PROCEDURE — 25010000002 ACETAMINOPHEN 10 MG/ML SOLUTION: Performed by: NURSE PRACTITIONER

## 2024-08-23 PROCEDURE — 82948 REAGENT STRIP/BLOOD GLUCOSE: CPT

## 2024-08-23 PROCEDURE — 97166 OT EVAL MOD COMPLEX 45 MIN: CPT

## 2024-08-23 PROCEDURE — 93005 ELECTROCARDIOGRAM TRACING: CPT | Performed by: NURSE PRACTITIONER

## 2024-08-23 PROCEDURE — 25010000002 MAGNESIUM SULFATE IN D5W 1G/100ML (PREMIX) 1-5 GM/100ML-% SOLUTION: Performed by: NURSE PRACTITIONER

## 2024-08-23 PROCEDURE — 84484 ASSAY OF TROPONIN QUANT: CPT | Performed by: INTERNAL MEDICINE

## 2024-08-23 PROCEDURE — 99232 SBSQ HOSP IP/OBS MODERATE 35: CPT | Performed by: INTERNAL MEDICINE

## 2024-08-23 PROCEDURE — 83735 ASSAY OF MAGNESIUM: CPT | Performed by: NURSE PRACTITIONER

## 2024-08-23 PROCEDURE — 80069 RENAL FUNCTION PANEL: CPT | Performed by: NURSE PRACTITIONER

## 2024-08-23 PROCEDURE — 25010000002 NICARDIPINE 2.5 MG/ML SOLUTION 10 ML VIAL: Performed by: NURSE PRACTITIONER

## 2024-08-23 PROCEDURE — 97163 PT EVAL HIGH COMPLEX 45 MIN: CPT

## 2024-08-23 PROCEDURE — 82330 ASSAY OF CALCIUM: CPT | Performed by: NURSE PRACTITIONER

## 2024-08-23 PROCEDURE — 25010000002 CEFAZOLIN PER 500 MG: Performed by: NURSE PRACTITIONER

## 2024-08-23 PROCEDURE — 25010000002 BUMETANIDE PER 0.5 MG: Performed by: NURSE PRACTITIONER

## 2024-08-23 PROCEDURE — 93010 ELECTROCARDIOGRAM REPORT: CPT | Performed by: INTERNAL MEDICINE

## 2024-08-23 PROCEDURE — 25010000002 MORPHINE PER 10 MG: Performed by: NURSE PRACTITIONER

## 2024-08-23 PROCEDURE — 85025 COMPLETE CBC W/AUTO DIFF WBC: CPT | Performed by: NURSE PRACTITIONER

## 2024-08-23 PROCEDURE — 71045 X-RAY EXAM CHEST 1 VIEW: CPT

## 2024-08-23 RX ORDER — BUMETANIDE 0.25 MG/ML
1 INJECTION INTRAMUSCULAR; INTRAVENOUS ONCE
Status: COMPLETED | OUTPATIENT
Start: 2024-08-23 | End: 2024-08-23

## 2024-08-23 RX ORDER — CALCIUM GLUCONATE 20 MG/ML
2000 INJECTION, SOLUTION INTRAVENOUS ONCE
Status: COMPLETED | OUTPATIENT
Start: 2024-08-23 | End: 2024-08-23

## 2024-08-23 RX ORDER — POTASSIUM CHLORIDE 1500 MG/1
20 TABLET, EXTENDED RELEASE ORAL ONCE
Status: COMPLETED | OUTPATIENT
Start: 2024-08-23 | End: 2024-08-23

## 2024-08-23 RX ORDER — SIMETHICONE 80 MG
160 TABLET,CHEWABLE ORAL 4 TIMES DAILY PRN
Status: DISCONTINUED | OUTPATIENT
Start: 2024-08-23 | End: 2024-08-27 | Stop reason: HOSPADM

## 2024-08-23 RX ORDER — COLCHICINE 0.6 MG/1
0.6 TABLET ORAL EVERY 12 HOURS SCHEDULED
Status: DISCONTINUED | OUTPATIENT
Start: 2024-08-23 | End: 2024-08-27

## 2024-08-23 RX ORDER — OXYCODONE HYDROCHLORIDE 5 MG/1
5 TABLET ORAL EVERY 4 HOURS PRN
Status: COMPLETED | OUTPATIENT
Start: 2024-08-23 | End: 2024-08-24

## 2024-08-23 RX ORDER — TAMSULOSIN HYDROCHLORIDE 0.4 MG/1
0.4 CAPSULE ORAL DAILY
Status: DISCONTINUED | OUTPATIENT
Start: 2024-08-23 | End: 2024-08-27 | Stop reason: HOSPADM

## 2024-08-23 RX ADMIN — CHLORHEXIDINE GLUCONATE, 0.12% ORAL RINSE 15 ML: 1.2 SOLUTION DENTAL at 08:02

## 2024-08-23 RX ADMIN — CHLORHEXIDINE GLUCONATE, 0.12% ORAL RINSE 15 ML: 1.2 SOLUTION DENTAL at 20:43

## 2024-08-23 RX ADMIN — HYDROCODONE BITARTRATE AND ACETAMINOPHEN 1 TABLET: 5; 325 TABLET ORAL at 02:02

## 2024-08-23 RX ADMIN — POLYETHYLENE GLYCOL 3350 17 G: 17 POWDER, FOR SOLUTION ORAL at 08:02

## 2024-08-23 RX ADMIN — MORPHINE SULFATE 2 MG: 2 INJECTION, SOLUTION INTRAMUSCULAR; INTRAVENOUS at 00:43

## 2024-08-23 RX ADMIN — COLCHICINE 0.6 MG: 0.6 TABLET, FILM COATED ORAL at 20:43

## 2024-08-23 RX ADMIN — CALCIUM GLUCONATE 2000 MG: 20 INJECTION, SOLUTION INTRAVENOUS at 09:22

## 2024-08-23 RX ADMIN — TAMSULOSIN HYDROCHLORIDE 0.4 MG: 0.4 CAPSULE ORAL at 09:23

## 2024-08-23 RX ADMIN — ASPIRIN 81 MG: 81 TABLET, COATED ORAL at 08:02

## 2024-08-23 RX ADMIN — COLCHICINE 0.6 MG: 0.6 TABLET, FILM COATED ORAL at 09:23

## 2024-08-23 RX ADMIN — MORPHINE SULFATE 2 MG: 2 INJECTION, SOLUTION INTRAMUSCULAR; INTRAVENOUS at 20:45

## 2024-08-23 RX ADMIN — MUPIROCIN 1 APPLICATION: 20 OINTMENT TOPICAL at 08:02

## 2024-08-23 RX ADMIN — POLYETHYLENE GLYCOL 3350 17 G: 17 POWDER, FOR SOLUTION ORAL at 20:43

## 2024-08-23 RX ADMIN — ATORVASTATIN CALCIUM 40 MG: 40 TABLET, FILM COATED ORAL at 20:43

## 2024-08-23 RX ADMIN — MORPHINE SULFATE 2 MG: 2 INJECTION, SOLUTION INTRAMUSCULAR; INTRAVENOUS at 08:01

## 2024-08-23 RX ADMIN — MORPHINE SULFATE 2 MG: 2 INJECTION, SOLUTION INTRAMUSCULAR; INTRAVENOUS at 11:11

## 2024-08-23 RX ADMIN — MORPHINE SULFATE 2 MG: 2 INJECTION, SOLUTION INTRAMUSCULAR; INTRAVENOUS at 05:31

## 2024-08-23 RX ADMIN — BUMETANIDE 1 MG: 0.25 INJECTION INTRAMUSCULAR; INTRAVENOUS at 09:23

## 2024-08-23 RX ADMIN — PANTOPRAZOLE SODIUM 40 MG: 40 TABLET, DELAYED RELEASE ORAL at 05:32

## 2024-08-23 RX ADMIN — SODIUM CHLORIDE 2 G: 900 INJECTION INTRAVENOUS at 01:14

## 2024-08-23 RX ADMIN — OXYCODONE HYDROCHLORIDE 5 MG: 5 TABLET ORAL at 22:59

## 2024-08-23 RX ADMIN — SODIUM CHLORIDE 2 G: 900 INJECTION INTRAVENOUS at 08:59

## 2024-08-23 RX ADMIN — Medication 12.5 MG: at 09:23

## 2024-08-23 RX ADMIN — MAGNESIUM SULFATE IN DEXTROSE 1 G: 10 INJECTION, SOLUTION INTRAVENOUS at 08:59

## 2024-08-23 RX ADMIN — OXYCODONE HYDROCHLORIDE 5 MG: 5 TABLET ORAL at 18:36

## 2024-08-23 RX ADMIN — ACETAMINOPHEN 1000 MG: 10 INJECTION INTRAVENOUS at 00:37

## 2024-08-23 RX ADMIN — ENOXAPARIN SODIUM 40 MG: 100 INJECTION SUBCUTANEOUS at 15:49

## 2024-08-23 RX ADMIN — MORPHINE SULFATE 2 MG: 2 INJECTION, SOLUTION INTRAMUSCULAR; INTRAVENOUS at 22:59

## 2024-08-23 RX ADMIN — NICARDIPINE HYDROCHLORIDE 5 MG/HR: 25 INJECTION, SOLUTION INTRAVENOUS at 00:15

## 2024-08-23 RX ADMIN — MUPIROCIN 1 APPLICATION: 20 OINTMENT TOPICAL at 20:43

## 2024-08-23 RX ADMIN — POTASSIUM CHLORIDE 20 MEQ: 1500 TABLET, EXTENDED RELEASE ORAL at 09:23

## 2024-08-23 RX ADMIN — SENNOSIDES AND DOCUSATE SODIUM 2 TABLET: 50; 8.6 TABLET ORAL at 08:01

## 2024-08-23 RX ADMIN — MORPHINE SULFATE 2 MG: 2 INJECTION, SOLUTION INTRAMUSCULAR; INTRAVENOUS at 16:03

## 2024-08-23 RX ADMIN — SODIUM CHLORIDE 2 G: 900 INJECTION INTRAVENOUS at 17:09

## 2024-08-23 RX ADMIN — OXYCODONE HYDROCHLORIDE 5 MG: 5 TABLET ORAL at 13:33

## 2024-08-23 RX ADMIN — SENNOSIDES AND DOCUSATE SODIUM 2 TABLET: 50; 8.6 TABLET ORAL at 20:43

## 2024-08-23 RX ADMIN — Medication 12.5 MG: at 20:43

## 2024-08-23 RX ADMIN — MAGNESIUM SULFATE IN DEXTROSE 1 G: 10 INJECTION, SOLUTION INTRAVENOUS at 01:53

## 2024-08-23 NOTE — PROGRESS NOTES
S/P POD# 1 ascending aortic aneurysm repair/ aortic valve repair/ JUSTIN clipping--Isai  EF 60-65% (cath)    Subjective:  no c/o's, concerned about krause coming out d/t frequent urination    Extubated ~ 1535 yest  ST elevation c/w pericarditis  Drips:  insulin, off Cardene now  CI 2.5, CVP 15,   Wt is up 10 kgs from preop  CXR:  atel, gastric bubble  UF -2L      Intake/Output Summary (Last 24 hours) at 8/23/2024 0841  Last data filed at 8/23/2024 0600  Gross per 24 hour   Intake 5862 ml   Output 5110 ml   Net 752 ml     Temp:  [96.8 °F (36 °C)-100.2 °F (37.9 °C)] 100.2 °F (37.9 °C)  Heart Rate:  [65-80] 76  Resp:  [14-32] 25  BP: (100-110)/(52-58) 110/58  FiO2 (%):  [40 %-70 %] 40 %  /8    Results from last 7 days   Lab Units 08/23/24  0306 08/22/24  1707 08/22/24  1658 08/22/24  1137 08/22/24  1112 08/22/24  0947 08/22/24  0942   WBC 10*3/mm3 14.40*  --  14.62*  --  14.14*  --  12.20*   HEMOGLOBIN g/dL 11.4*  --  11.7*  --  12.1*  --  11.1*   HEMOGLOBIN, POC g/dL  --  11.0*  --    < >  --    < >  --    HEMATOCRIT % 35.6*  --  35.6*  --  35.7*  --  34.7*   HEMATOCRIT POC %  --  32*  --    < >  --    < >  --    PLATELETS 10*3/mm3 143  --  150  --  127*  --  107*   INR  1.05  --   --   --  1.11*  --  1.34*    < > = values in this interval not displayed.     Results from last 7 days   Lab Units 08/23/24  0306   CREATININE mg/dL 0.91   POTASSIUM mmol/L 4.0   SODIUM mmol/L 136   MAGNESIUM mg/dL 2.9*   PHOSPHORUS mg/dL 4.9*     ica 1.12    Physical Exam:  Neuro intact, nad, up in recliner, no family this morning  Tele:  SR 80s  Diminished bases, 97% 2L  dsg c/d/i, no drainage  Benign abd, no BM, + belching  No edema    Assessment/Plan:  Active Problems:    Nonrheumatic aortic valve insufficiency    Thoracic aortic aneurysm without rupture    - Bicuspid aortopathy/ ascending aortic aneurysm, 5 cm/ mild to mod aortic insufficiency, EF 60-65% (cath)--s/p ascending aortic aneurysm repair/ aortic valve repair/ JUSTIN  clipping (Isai), no op note available at this time  - HTN  - HLD--statin  - Postop leukocytosis, reactive--pulmonary toileting  - Postop ABLA, expected--watch closely  - Postop ST elevation c/w pericarditis--add colchicine    POD# 1.  Doing well.  DC mian/rosaura.  EKG with ST elevation c/w pericarditis, add colchicine.  On asa/statin, add low dose bb.  Diurese.  Restart home Flomax.  Re-eval chest tubes later today and krause.    Routine care--as above  De-escal  D/w pt/nsg, Dr. Alex  Anticipate home at discharge    Yoana Vargas, APRN  8/23/2024  08:41 EDT

## 2024-08-23 NOTE — CONSULTS
"Diabetes Education    Patient Name:  Sami Quiroga  YOB: 1965  MRN: [id number]  Admit Date:  8/22/2024        Patient seen per MD order.  Patient's A1C is 6.3%.  Discussed dx criteria for prediabetes and diabetes. Patient verbalized understanding. He states he was told a few years ago that he had prediabetes and when he was watching what he ate he was able to get the numbers better controlled.  He states he has \"fallen off the wagon\".  Discussed the one plate method.  Encouraged pt to stick around 3 carb servings per meal.  He states he mostly drinks water at home. Discussed portion sizes.  Discussed hyperglycemia and the importance of lowering blood sugars.  Encouraged pt to increase physical activity.  Discussed with patient that most insurance will not pay for a glucometer with a dx of prediabetes, but if he wanted to pay out of pocket he could get a meter to start checking blood sugars at home. Patient given handouts on one plate method, prediabetes, A1C, and [facility].  Told patient about the prediabetes class offered at [facility] for $50.  Patient has no further questions at this time.       Electronically signed by:  Radha Lombardi RN  08/23/24 14:15 EDT  "

## 2024-08-23 NOTE — CASE MANAGEMENT/SOCIAL WORK
Discharge Planning Assessment   Alejandro     Patient Name: Sami Quiroga  MRN: [id number]  Today's Date: 8/23/2024    Admit Date: 8/22/2024    Plan: DC Plan: Pending clinical course and PT/OT evals. From home with family. Aortic Arch Repair 8/22/2024   Discharge Needs Assessment       Row Name 08/23/24 1204       Living Environment    People in Home spouse    Name(s) of People in Home Jacquelin Kimber    Current Living Arrangements home    Potentially Unsafe Housing Conditions none    In the past 12 months has the electric, gas, oil, or water company threatened to shut off services in your home? No    Primary Care Provided by self    Provides Primary Care For no one    Family Caregiver if Needed spouse    Family Caregiver Names Jacquelin Quiroga    Quality of Family Relationships helpful;involved;supportive    Able to Return to Prior Arrangements yes       Resource/Environmental Concerns    Resource/Environmental Concerns none    Transportation Concerns none       Transportation Needs    In the past 12 months, has lack of transportation kept you from medical appointments or from getting medications? no    In the past 12 months, has lack of transportation kept you from meetings, work, or from getting things needed for daily living? No       Food Insecurity    Within the past 12 months, you worried that your food would run out before you got the money to buy more. Never true    Within the past 12 months, the food you bought just didn't last and you didn't have money to get more. Never true       Transition Planning    Patient/Family Anticipates Transition to home with family    Patient/Family Anticipated Services at Transition none    Transportation Anticipated car, drives self;family or friend will provide       Discharge Needs Assessment    Readmission Within the Last 30 Days no previous admission in last 30 days    Equipment Currently Used at Home none    Concerns to be Addressed discharge planning    Anticipated  Changes Related to Illness none    Equipment Needed After Discharge other (see comments)  CM will continue to monitor for needs Post op    Provided Post Acute Provider List? N/A    Provided Post Acute Provider Quality & Resource List? N/A                   Discharge Plan       Row Name 08/23/24 4147       Plan    Plan DC Plan: Pending clinical course and PT/OT evals. From home with family. Aortic Arch Repair 8/22/2024    Patient/Family in Agreement with Plan yes    Provided Post Acute Provider List? N/A    Provided Post Acute Provider Quality & Resource List? N/A    Plan Comments CM spoke with patient at bedside to discuss admission assessment and discharge planning. Patient confirms PCP and pharmacy. Patient confirms he is agreeable to enrolling in meds to bed program. CM enrolled patient and updated pharmacy in [employer]. Patient denies any difficulty affording medications or food at this time. Patient denies any additional needs for services or DME at this time. Patient reports IADL's and drives. Patient spouse will provide transportation when ready for DC.CM spoke with patient’s nurse and [facility] NP Yoana Harmon to obtain clinical updates. NP anticipates patient will be able to return home with spouse when ready. Awaiting PT/OT recommendations.CM will continue to follow for any further needs and adjust discharge plan accordingly. DC Barriers: POD 1 OHS, Cardiac montioring,Central Line, Arterial Line, Pacer Wires, Chest Tubes x2, Insulin gtt, IV Bumex/Electrolytes, and monitor labs.                 Expected Discharge Date and Time       Expected Discharge Date Expected Discharge Time    Aug 27, 2024            Demographic Summary       Row Name 08/23/24 1207       General Information    Admission Type inpatient    Arrived From home;operating room    Referral Source admission list    Reason for Consult discharge planning    Preferred Language English       Contact Information    Permission Granted to Share  Info With                    Functional Status       Row Name 08/23/24 1203       Functional Status    Usual Activity Tolerance good    Current Activity Tolerance fair    Functional Status Comments Aortic Arch Repair 8/22/2024       Physical Activity    On average, how many days per week do you engage in moderate to strenuous exercise (like a brisk walk)? 4 days    On average, how many minutes do you engage in exercise at this level? 20 min    Number of minutes of exercise per week 80       Functional Status, IADL    Medications independent    Meal Preparation independent    Housekeeping independent    Laundry independent    Shopping independent       Mental Status    General Appearance WDL WDL       Mental Status Summary    Recent Changes in Mental Status/Cognitive Functioning no changes       Employment/    Employment Status employed full-time    Employment/ Comments [employer]    Current or Previous  Service none                 Met with patient in room   Maintain distance greater than six feet and spent less than fifteen minutes in the room.      Yumiko Pollard RN      Office Phone: [phone]  Office Cell:     [phone]

## 2024-08-23 NOTE — THERAPY EVALUATION
Patient Name: Sami Quiroga  : 1965    MRN: [id number]                              Today's Date: 2024       Admit Date: 2024    Visit Dx:     ICD-10-CM ICD-9-CM   1. Nonrheumatic aortic valve insufficiency  I35.1 424.1   2. Aneurysm of ascending aorta without rupture  I71.21 441.2     Patient Active Problem List   Diagnosis    Heart murmur    Hypertension    Hyperlipidemia    Nonrheumatic aortic valve insufficiency    Thoracic aortic aneurysm without rupture    Thoracic aortic ectasia    Nocturia    Aortic valve disorder    Aortic root dilation    Anxiety    Allergic rhinitis    Nonrheumatic aortic valve insufficiency    Aneurysm of ascending aorta     Past Medical History:   Diagnosis Date    Aortic insufficiency due to bicuspid aortic valve     Ascending aortic aneurysm     Bicuspid aortic valve     Heart murmur     Hyperlipidemia     Hypertension      Past Surgical History:   Procedure Laterality Date    APPENDECTOMY      CARDIAC CATHETERIZATION Right 2024    Procedure: Left Heart Cath, RADIAL;  Surgeon: Brittanie Lopez MD;  Location: [facility] CATH INVASIVE LOC[facility];  Service: Cardiovascular;  Laterality: Right;  cath in 2024      General Information       Row Name 24 1509          Physical Therapy Time and Intention    Document Type evaluation  -CL     Mode of Treatment physical therapy  -CL       Row Name 24 1509          General Information    Patient Profile Reviewed yes  -CL     Prior Level of Function independent:;driving;ADL's;community mobility;work  works as an optomotrist  -CL     Existing Precautions/Restrictions sternal  -CL     Barriers to Rehab none identified  -CL       Row Name 24 1509          Living Environment    People in Home spouse  -CL     Name(s) of People in Home Wife: Jacquelin  -CL       Row Name 24 1509          Home Main Entrance    Number of Stairs, Main Entrance four  -CL     Stair Railings, Main Entrance railings safe  and in good condition  -CL       Row Name 08/23/24 1509          Stairs Within Home, Primary    Number of Stairs, Within Home, Primary none  -CL       Row Name 08/23/24 1509          Cognition    Orientation Status (Cognition) oriented x 4  -CL       Row Name 08/23/24 1509          Safety Issues, Functional Mobility    Impairments Affecting Function (Mobility) pain  -CL               User Key  (r) = Recorded By, (t) = Taken By, (c) = Cosigned By      Initials Name Provider Type    Bharti Rosales, PT Physical Therapist                   Mobility       Row Name 08/23/24 1512          Bed Mobility    Comment, (Bed Mobility) Pt up in recliner at start and end of session  -CL       Row Name 08/23/24 1512          Sit-Stand Transfer    Sit-Stand Custer (Transfers) contact guard  -CL       Row Name 08/23/24 1512          Gait/Stairs (Locomotion)    Custer Level (Gait) minimum assist (75% patient effort)  -CL     Assistive Device (Gait) walker, front-wheeled  -CL     Patient was able to Ambulate yes  -CL     Distance in Feet (Gait) 50  -CL               User Key  (r) = Recorded By, (t) = Taken By, (c) = Cosigned By      Initials Name Provider Type    Bharti Rosales, PT Physical Therapist                   Obj/Interventions       Row Name 08/23/24 1513          Range of Motion Comprehensive    General Range of Motion bilateral lower extremity ROM WNL  -CL       Row Name 08/23/24 1513          Strength Comprehensive (MMT)    General Manual Muscle Testing (MMT) Assessment no strength deficits identified  -CL       Row Name 08/23/24 1513          Balance    Balance Assessment sitting static balance;sitting dynamic balance;standing static balance;standing dynamic balance  -CL     Static Sitting Balance contact guard  -CL     Dynamic Sitting Balance contact guard  -CL     Position, Sitting Balance supported;unsupported;sitting in chair  -CL     Static Standing Balance contact guard  -CL     Dynamic  Standing Balance contact guard  -CL     Position/Device Used, Standing Balance walker, rolling;supported  -CL       Row Name 08/23/24 1513          Sensory Assessment (Somatosensory)    Sensory Assessment (Somatosensory) sensation intact  -CL               User Key  (r) = Recorded By, (t) = Taken By, (c) = Cosigned By      Initials Name Provider Type    CL Bharti Aviles, PT Physical Therapist                   Goals/Plan       Row Name 08/23/24 1518          Bed Mobility Goal 1 (PT)    Activity/Assistive Device (Bed Mobility Goal 1, PT) bed mobility activities, all  -CL     Mason Level/Cues Needed (Bed Mobility Goal 1, PT) modified independence  -CL     Time Frame (Bed Mobility Goal 1, PT) long term goal (LTG);2 weeks  -CL       Row Name 08/23/24 1518          Transfer Goal 1 (PT)    Activity/Assistive Device (Transfer Goal 1, PT) sit-to-stand/stand-to-sit;bed-to-chair/chair-to-bed  -CL     Mason Level/Cues Needed (Transfer Goal 1, PT) modified independence  -CL     Time Frame (Transfer Goal 1, PT) long term goal (LTG);2 weeks  -CL       Row Name 08/23/24 1518          Gait Training Goal 1 (PT)    Activity/Assistive Device (Gait Training Goal 1, PT) assistive device use;gait (walking locomotion)  -CL     Mason Level (Gait Training Goal 1, PT) independent;modified independence  -CL     Distance (Gait Training Goal 1, PT) 300'  -CL     Time Frame (Gait Training Goal 1, PT) long term goal (LTG);2 weeks  -CL       Row Name 08/23/24 1518          Stairs Goal 1 (PT)    Activity/Assistive Device (Stairs Goal 1, PT) ascending stairs;descending stairs  -CL     Mason Level/Cues Needed (Stairs Goal 1, PT) modified independence  -CL     Number of Stairs (Stairs Goal 1, PT) 4  -CL     Time Frame (Stairs Goal 1, PT) long term goal (LTG);2 weeks  -CL       Row Name 08/23/24 1518          Therapy Assessment/Plan (PT)    Planned Therapy Interventions (PT) balance training;bed mobility training;gait  training;patient/family education;postural re-education;stair training;strengthening;transfer training  -CL               User Key  (r) = Recorded By, (t) = Taken By, (c) = Cosigned By      Initials Name Provider Type    Bharti Rosales, PT Physical Therapist                   Clinical Impression       Row Name 08/23/24 1513          Pain    Pretreatment Pain Rating 5/10  -CL     Posttreatment Pain Rating 5/10  -CL     Pain Location incisional  -CL       Row Name 08/23/24 1513          Plan of Care Review    Plan of Care Reviewed With patient  -CL     Outcome Evaluation Sami Quiroga is a 59 y.o. male with PMH of HTN, HLD and bicuspid aortopathy who underwent surgical repair of aneurysm of ascending aorta and aortic valve repair by Dr. Alcazar on 8/22/24 and is seen by PT on POD #1.  At baseline, he lives with wife in a H with 4 HANK and  is independent with all mobility, ADLs, and IADLs without AD. He is an active  and works as an Optometrist.  At time of PT evaluation, he is A&O x 4 with moderate chest pain.   Pt was educated in sternal precautions and verbalized understanding.  Pt transfers with min-CGA and is able to ambulate 50' with RW, CGA and slow gait. PT cued to increase step length.  Pt tolerated session well and if progresses as anticipated, should be safe to return home with wife and no follow up PT.  -CL       Row Name 08/23/24 1513          Therapy Assessment/Plan (PT)    Rehab Potential (PT) good, to achieve stated therapy goals  -CL     Criteria for Skilled Interventions Met (PT) yes;meets criteria  -CL     Therapy Frequency (PT) 5 times/wk  -CL     Predicted Duration of Therapy Intervention (PT) Until discharge  -CL       Row Name 08/23/24 1513          Vital Signs    Pre Systolic BP Rehab 100  -CL     Pre Treatment Diastolic BP 64  -CL     Post Systolic BP Rehab 91  -CL     Post Treatment Diastolic BP 64  -CL     Pretreatment Heart Rate (beats/min) 93  -CL     Pretreatment Resp  Rate (breaths/min) 33  -CL     Posttreatment Resp Rate (breaths/min) 32  -CL     Pre SpO2 (%) 96  -CL     O2 Delivery Pre Treatment supplemental O2  2L  -CL     Post SpO2 (%) 97  -CL     O2 Delivery Post Treatment supplemental O2  -CL     Pre Patient Position Sitting  -CL     Intra Patient Position Standing  -CL     Post Patient Position Sitting  -CL       Row Name 08/23/24 1513          Positioning and Restraints    Pre-Treatment Position sitting in chair/recliner  -CL     Post Treatment Position chair  -CL     In Chair notified nsg;reclined;call light within reach;encouraged to call for assist;exit alarm on;with family/caregiver  -CL               User Key  (r) = Recorded By, (t) = Taken By, (c) = Cosigned By      Initials Name Provider Type    Bharti Rosales PT Physical Therapist                   Outcome Measures       Row Name 08/23/24 1519 08/23/24 0800       How much help from another person do you currently need...    Turning from your back to your side while in flat bed without using bedrails? 3  -CL 3  -MS    Moving from lying on back to sitting on the side of a flat bed without bedrails? 3  -CL 3  -MS    Moving to and from a bed to a chair (including a wheelchair)? 3  -CL 3  -MS    Standing up from a chair using your arms (e.g., wheelchair, bedside chair)? 3  -CL 3  -MS    Climbing 3-5 steps with a railing? 2  -CL 3  -MS    To walk in hospital room? 3  -CL 3  -MS    AM-PAC 6 Clicks Score (PT) 17  -CL 18  -MS    Highest Level of Mobility Goal 5 --> Static standing  -CL 6 --> Walk 10 steps or more  -MS              User Key  (r) = Recorded By, (t) = Taken By, (c) = Cosigned By      Initials Name Provider Type    Bharti Rosales, PT Physical Therapist    Jodi Ndiaye, RN Registered Nurse                                 Physical Therapy Education       Title: PT OT SLP Therapies (Done)       Topic: Physical Therapy (Done)       Point: Mobility training (Done)       Learning Progress  Summary             Patient Acceptance, E,TB, VU by CL at 8/23/2024 1520                         Point: Body mechanics (Done)       Learning Progress Summary             Patient Acceptance, E,TB, VU by CL at 8/23/2024 1520                         Point: Precautions (Done)       Learning Progress Summary             Patient Acceptance, E,TB, VU by CL at 8/23/2024 1520                                         User Key       Initials Effective Dates Name Provider Type Discipline     03/02/22 -  Bharti Aviles, PT Physical Therapist PT                  PT Recommendation and Plan  Planned Therapy Interventions (PT): balance training, bed mobility training, gait training, patient/family education, postural re-education, stair training, strengthening, transfer training  Plan of Care Reviewed With: patient  Outcome Evaluation: Sami Quiroga is a 59 y.o. male with PMH of HTN, HLD and bicuspid aortopathy who underwent surgical repair of aneurysm of ascending aorta and aortic valve repair by Dr. Alcazar on 8/22/24 and is seen by PT on POD #1.  At baseline, he lives with wife in a H with 4 HANK and  is independent with all mobility, ADLs, and IADLs without AD. He is an active  and works as an Optometrist.  At time of PT evaluation, he is A&O x 4 with moderate chest pain.   Pt was educated in sternal precautions and verbalized understanding.  Pt transfers with min-CGA and is able to ambulate 50' with RW, CGA and slow gait. PT cued to increase step length.  Pt tolerated session well and if progresses as anticipated, should be safe to return home with wife and no follow up PT.     Time Calculation:   PT Evaluation Complexity  History, PT Evaluation Complexity: 3 or more personal factors and/or comorbidities  Examination of Body Systems (PT Eval Complexity): total of 4 or more elements  Clinical Presentation (PT Evaluation Complexity): unstable  Clinical Decision Making (PT Evaluation Complexity): high  complexity  Overall Complexity (PT Evaluation Complexity): high complexity     PT Charges       Row Name 08/23/24 1521             Time Calculation    Start Time 1307  -CL      Stop Time 1326  -CL      Time Calculation (min) 19 min  -CL      PT Received On 08/23/24  -CL      PT - Next Appointment 08/25/24  -CL      PT Goal Re-Cert Due Date 09/06/24  -CL         Time Calculation- PT    Total Timed Code Minutes- PT 0 minute(s)  -CL                User Key  (r) = Recorded By, (t) = Taken By, (c) = Cosigned By      Initials Name Provider Type    CL Bharti Aviles, PT Physical Therapist                  Therapy Charges for Today       Code Description Service Date Service Provider Modifiers Qty    54223227064 HC PT EVAL HIGH COMPLEXITY 4 8/23/2024 Bharti Aviles, PT GP 1            PT G-Codes  AM-PAC 6 Clicks Score (PT): 17  PT Discharge Summary  Anticipated Discharge Disposition (PT): home with assist    Bharti Aviles, PT  8/23/2024

## 2024-08-23 NOTE — PLAN OF CARE
Goal Outcome Evaluation:      Pt had a very successful night. Did very well after dangling with no syncopal episodes. Pain has stayed around a 5 when he is due for medicine. Q8 the pt had 510mLs of urine out and 240mLs out from the chest tubes. Pt has been drinking water well with no coughing after drinks so we advanced his diet to a clear liquid diet as tolerated.

## 2024-08-23 NOTE — THERAPY EVALUATION
Patient Name: Sami Quiroga  : 1965    MRN: [id number]                              Today's Date: 2024       Admit Date: 2024    Visit Dx:     ICD-10-CM ICD-9-CM   1. Nonrheumatic aortic valve insufficiency  I35.1 424.1   2. Aneurysm of ascending aorta without rupture  I71.21 441.2     Patient Active Problem List   Diagnosis    Heart murmur    Hypertension    Hyperlipidemia    Nonrheumatic aortic valve insufficiency    Thoracic aortic aneurysm without rupture    Thoracic aortic ectasia    Nocturia    Aortic valve disorder    Aortic root dilation    Anxiety    Allergic rhinitis    Nonrheumatic aortic valve insufficiency    Aneurysm of ascending aorta     Past Medical History:   Diagnosis Date    Aortic insufficiency due to bicuspid aortic valve     Ascending aortic aneurysm     Bicuspid aortic valve     Heart murmur     Hyperlipidemia     Hypertension      Past Surgical History:   Procedure Laterality Date    APPENDECTOMY      CARDIAC CATHETERIZATION Right 2024    Procedure: Left Heart Cath, RADIAL;  Surgeon: Brittanie Lopez MD;  Location: [facility] CATH INVASIVE LOCATION;  Service: Cardiovascular;  Laterality: Right;  cath in 2024      General Information       Row Name 24 1632          OT Time and Intention    Document Type evaluation  -SR     Mode of Treatment occupational therapy  -SR       Row Name 24 1632          Occupational Profile    Reason for Services/Referral (Occupational Profile) Sami Quiroga is a 59 y.o. male with PMH of HTN, HLD and bicuspid aortopathy who underwent surgical repair of aneurysm of ascending aorta and aortic valve repair by Dr. Alcazar on 24 and is seen by PT on POD #1.  At baseline, he lives with wife in a [facility] with 4 HANK and  is independent with all mobility, ADLs, and IADLs without AD. He is an active  and works as an Optometrist.  -SR               User Key  (r) = Recorded By, (t) = Taken By, (c) = Cosigned By       Initials Name Provider Type    SR Melanie Silva OT Occupational Therapist                     Mobility/ADL's       Row Name 08/23/24 1633          Sit-Stand Transfer    Sit-Stand [place] (Transfers) contact guard  -SR       Row Name 08/23/24 1633          Functional Mobility    Functional Mobility- Ind. Level minimum assist (75% patient effort)  -SR     Functional Mobility- Device walker, front-wheeled  -SR               User Key  (r) = Recorded By, (t) = Taken By, (c) = Cosigned By      Initials Name Provider Type    SR Melanie Silva, OT Occupational Therapist                   Obj/Interventions       Row Name 08/23/24 1633          Range of Motion Comprehensive    Comment, General Range of Motion Within restricted limits  -SR       Row Name 08/23/24 1633          Strength Comprehensive (MMT)    Comment, General Manual Muscle Testing (MMT) Assessment Unable to MMT due to sternal precautions.  -SR               User Key  (r) = Recorded By, (t) = Taken By, (c) = Cosigned By      Initials Name Provider Type    SR Melanie Silva, OT Occupational Therapist                   Goals/Plan       Row Name 08/23/24 1635          Bathing Goal 1 (OT)    Activity/Device (Bathing Goal 1, OT) bathing skills, all  -SR     [place] Level/Cues Needed (Bathing Goal 1, OT) modified independence  -SR     Time Frame (Bathing Goal 1, OT) 2 weeks  -SR       Row Name 08/23/24 1635          Toileting Goal 1 (OT)    Activity/Device (Toileting Goal 1, OT) toileting skills, all  -SR     [place] Level/Cues Needed (Toileting Goal 1, OT) modified independence  -SR     Time Frame (Toileting Goal 1, OT) 2 weeks  -SR       [facility] Name 08/23/24 1635          Therapy Assessment/Plan (OT)    Planned Therapy Interventions (OT) activity tolerance training;BADL retraining;IADL retraining;functional balance retraining;neuromuscular control/coordination retraining;ROM/therapeutic exercise;transfer/mobility  retraining;strengthening exercise;occupation/activity based interventions  -SR               User Key  (r) = Recorded By, (t) = Taken By, (c) = Cosigned By      Initials Name Provider Type    SR Melanie Silva OT Occupational Therapist                   Clinical Impression       Row Name 08/23/24 1633          Pain Assessment    Pretreatment Pain Rating 5/10  -SR     Posttreatment Pain Rating 5/10  -SR       Row Name 08/23/24 1633          Plan of Care Review    Outcome Evaluation Sami Quiroga is a 59 y.o. male with PMH of HTN, HLD and bicuspid aortopathy who underwent surgical repair of aneurysm of ascending aorta and aortic valve repair by Dr. Alcazar on 8/22/24 and is seen by PT on POD #1.  At baseline, he lives with wife in a [facility] with 4 HANK and  is independent with all mobility, ADLs, and IADLs without AD. 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  Anticipate he will progress well with ADLs as multiple lines and tubes are removed.  Recommend return home with assist of wife.  -SR       Row Name 08/23/24 1633          Therapy Assessment/Plan (OT)    Rehab Potential (OT) good, to achieve stated therapy goals  -SR     Criteria for Skilled Therapeutic Interventions Met (OT) yes  -SR     Therapy Frequency (OT) 5 times/wk  -SR     Predicted Duration of Therapy Intervention (OT) Until discharge  -SR       Row Name 08/23/24 1633          Therapy Plan Review/Discharge Plan (OT)    Anticipated Discharge Disposition (OT) home with assist  -SR       Row Name 08/23/24 1633          Positioning and Restraints    Pre-Treatment Position sitting in chair/recliner  -SR     Post Treatment Position chair  -SR     In Chair call light within reach;encouraged to call for assist;exit alarm on  -SR               User Key  (r) = Recorded By, (t) = Taken By, (c) = Cosigned By      Initials Name Provider Type    SR Melanie Silva OT Occupational  Therapist                   Outcome Measures       Row Name 08/23/24 1519 08/23/24 0800       How much help from another person do you currently need...    Turning from your back to your side while in flat bed without using bedrails? 3  -CL 3  -MS    Moving from lying on back to sitting on the side of a flat bed without bedrails? 3  -CL 3  -MS    Moving to and from a bed to a chair (including a wheelchair)? 3  -CL 3  -MS    Standing up from a chair using your arms (e.g., wheelchair, bedside chair)? 3  -CL 3  -MS    Climbing 3-5 steps with a railing? 2  -CL 3  -MS    To walk in hospital room? 3  -CL 3  -MS    AM-PAC 6 Clicks Score (PT) 17  -CL 18  -MS    Highest Level of Mobility Goal 5 --> Static standing  -CL 6 --> Walk 10 steps or more  -MS              User Key  (r) = Recorded By, (t) = Taken By, (c) = Cosigned By      Initials Name Provider Type    CL Bharti Aviles, PT Physical Therapist    Jodi Ndiaye, RN Registered Nurse                    Occupational Therapy Education       Title: PT OT SLP Therapies (Done)       Topic: Occupational Therapy (Done)       Point: ADL training (Done)       Description:   Instruct learner(s) on proper safety adaptation and remediation techniques during self care or transfers.   Instruct in proper use of assistive devices.                  Learning Progress Summary             Patient Acceptance, E,TB, VU by SR at 8/23/2024 1636                         Point: Body mechanics (Done)       Description:   Instruct learner(s) on proper positioning and spine alignment during self-care, functional mobility activities and/or exercises.                  Learning Progress Summary             Patient Acceptance, E,TB, VU by SR at 8/23/2024 1636                                         User Key       Initials Effective Dates Name Provider Type Discipline     06/16/21 -  Melanie Silva OT Occupational Therapist OT                  OT Recommendation and Plan  Planned  Therapy Interventions (OT): activity tolerance training, BADL retraining, IADL retraining, functional balance retraining, neuromuscular control/coordination retraining, ROM/therapeutic exercise, transfer/mobility retraining, strengthening exercise, occupation/activity based interventions  Therapy Frequency (OT): 5 times/wk  Plan of Care Review  Outcome Evaluation: Sami Quiroga is a 59 y.o. male with PMH of HTN, HLD and bicuspid aortopathy who underwent surgical repair of aneurysm of ascending aorta and aortic valve repair by Dr. Alcazar on 8/22/24 and is seen by PT on POD #1.  At baseline, he lives with wife in a H with 4 HANK and  is independent with all mobility, ADLs, and IADLs without AD. 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  Anticipate he will progress well with ADLs as multiple lines and tubes are removed.  Recommend return home with assist of wife.     Time Calculation:         Time Calculation- OT       Row Name 08/23/24 1636             Time Calculation- OT    OT Start Time 1300  -SR      OT Stop Time 1320  -SR      OT Time Calculation (min) 20 min  -SR      Total Timed Code Minutes- OT 0 minute(s)  -SR      OT Received On 08/23/24  -SR      OT - Next Appointment 08/26/24  -SR      OT Goal Re-Cert Due Date 09/06/24  -SR                User Key  (r) = Recorded By, (t) = Taken By, (c) = Cosigned By      Initials Name Provider Type    SR Melanie Silva OT Occupational Therapist                  Therapy Charges for Today       Code Description Service Date Service Provider Modifiers Qty    [id number]  OT EVAL MOD COMPLEXITY 4 8/23/2024 Melanie Silva OT GO 1                 Melanie Silva OT  8/23/2024

## 2024-08-23 NOTE — PLAN OF CARE
Goal Outcome Evaluation:              Outcome Evaluation: Sami Quiroga is a 59 y.o. male with PMH of HTN, HLD and bicuspid aortopathy who underwent surgical repair of aneurysm of ascending aorta and aortic valve repair by Dr. Alcazar on 8/22/24 and is seen by PT on POD #1.  At baseline, he lives with wife in a [facility] with 4 HANK and  is independent with all mobility, ADLs, and IADLs without AD. 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  Anticipate he will progress well with ADLs as multiple lines and tubes are removed.  Recommend return home with assist of wife.      Anticipated Discharge Disposition (OT): home with assist

## 2024-08-23 NOTE — PLAN OF CARE
Goal Outcome Evaluation:  Plan of Care Reviewed With: patient           Outcome Evaluation: Sami Quiroga is a 59 y.o. male with PMH of HTN, HLD and bicuspid aortopathy who underwent surgical repair of aneurysm of ascending aorta and aortic valve repair by Dr. Alcazar on 8/22/24 and is seen by PT on POD #1.  At baseline, he lives with wife in a H with 4 HANK and  is independent with all mobility, ADLs, and IADLs without AD. He is an active  and works as an Optometrist.  At time of PT evaluation, he is A&O x 4 with moderate chest pain.   Pt was educated in sternal precautions and verbalized understanding.  Pt transfers with min-CGA and is able to ambulate 50' with RW, CGA and slow gait. PT cued to increase step length.  Pt tolerated session well and if progresses as anticipated, should be safe to return home with wife and no follow up PT.      Anticipated Discharge Disposition (PT): home with assist

## 2024-08-23 NOTE — PROGRESS NOTES
Cardiology Progress Note      PATIENT IDENTIFICATION    Name: Sami Quiroga  Age: 59 y.o. Sex: male : 1965  MRN: [id number]    Requesting Provider    Carlos Alex MD     LOS: 1 day       Reason For Followup:  Valvular heart disease  Aortic aneurysm status post repair      Subjective:    Interval History:  Seen and examined.  Chart and labs reviewed.  Patient reports some chest soreness.  He reports being tired and fatigued today.  EKG today demonstrates pericarditis.  Case discussed with CT surgery.    Review of Systems:  A complete review of systems was undertaken with the pertinent cardiovascular findings listed in history of present illness and all other systems being negative.     Assessment & Plan      Impressions:  Aortic insufficiency with ascending aortic aneurysm status post ascending aortic aneurysm repair and aortic valve repair this admission  Hypertension  Hyperlipidemia     Recommendations:  Continue with routine postoperative care.  Lines and tubes per surgery  Increase activity as tolerated  Agree with beta-blocker and diuretics        Objective:    Medication Review:   Scheduled Meds:aspirin, 81 mg, Oral, Daily  atorvastatin, 40 mg, Oral, Nightly  ceFAZolin, 2 g, Intravenous, Q8H  chlorhexidine, 15 mL, Mouth/Throat, Q12H  colchicine, 0.6 mg, Oral, Q12H  enoxaparin, 40 mg, Subcutaneous, Daily  metoprolol tartrate, 12.5 mg, Oral, Q12H  mupirocin, , Each Nare, BID  pantoprazole, 40 mg, Oral, Q AM  polyethylene glycol, 17 g, Oral, BID  senna-docusate sodium, 2 tablet, Oral, BID  tamsulosin, 0.4 mg, Oral, Daily      Continuous Infusions:insulin, 0-100 Units/hr, Last Rate: 1.9 Units/hr (24 1331)  sodium chloride, 30 mL/hr, Last Rate: 30 mL/hr (24 1131)      PRN Meds:.  acetaminophen **OR** acetaminophen **OR** acetaminophen    Calcium Replacement - Follow Nurse / BPA Driven Protocol    cyclobenzaprine    dextrose    dextrose    glucagon (human recombinant)     HYDROcodone-acetaminophen    Magnesium Cardiology Dose Replacement - Follow Nurse / BPA Driven Protocol    Morphine **AND** naloxone    nitroglycerin    ondansetron    oxyCODONE    Phosphorus Replacement - Follow Nurse / BPA Driven Protocol    Potassium Replacement - Follow Nurse / BPA Driven Protocol    simethicone      Aneurysm of ascending aorta    Nonrheumatic aortic valve insufficiency    Thoracic aortic aneurysm without rupture         Physical Exam:    General: Alert, cooperative, no distress, appears stated age  Head:  Normocephalic, atraumatic, mucous membranes moist  Eyes:  Conjunctivae/corneas clear, EOMs intact     Neck:  Supple,  no bruit  Lungs:  Clear to auscultation bilaterally, no wheezes, rhonchi or rales are noted  Chest wall: Tender at incision site  Heart::  Regular rate and rhythm, S1 and S2 normal, 1/6 systolic ejection murmur.  No rub or gallop  Abdomen: Soft, nontender, nondistended, bowel sounds active  Extremities: No cyanosis, clubbing.  Edema noted  Pulses: 2+ and symmetric all extremities  Skin:  No rashes or lesions  Neuro/psych: A&O x3. CN II through XII are grossly intact with appropriate affect    Vital Signs:  Vitals:    08/23/24 1330 08/23/24 1345 08/23/24 1400 08/23/24 1415   BP:   91/62    BP Location:       Patient Position:       Pulse: 92 89 89 89   Resp:       Temp:       TempSrc:       SpO2: 97% 96% 96% 96%   Weight:       Height:         Wt Readings from Last 1 Encounters:   08/23/24 98.4 kg (216 lb 14.9 oz)       Intake/Output Summary (Last 24 hours) at 8/23/2024 1429  Last data filed at 8/23/2024 1400  Gross per 24 hour   Intake 4130 ml   Output 2210 ml   Net 1920 ml         Results Review:     CBC    Results from last 7 days   Lab Units 08/23/24  0306 08/22/24  1707 08/22/24  1658 08/22/24  1525 08/22/24  1443 08/22/24  1339 08/22/24  1238 08/22/24  1137 08/22/24  1112 08/22/24  0947 08/22/24  0942 08/22/24  0905 08/22/24  0856 08/22/24  0740 08/21/24  0744   WBC  10*3/mm3 14.40*  --  14.62*  --   --   --   --   --  14.14*  --  12.20*  --  12.26*  --  6.62   HEMOGLOBIN g/dL 11.4*  --  11.7*  --   --   --   --   --  12.1*  --  11.1*  --  11.1*  --  14.7   HEMOGLOBIN, POC g/dL  --  11.0*  --  11.9* 11.5* 12.4 12.2   < >  --    < >  --    < >  --    < >  --    PLATELETS 10*3/mm3 143  --  150  --   --   --   --   --  127*  --  107*  --  194  --  184    < > = values in this interval not displayed.     Cr Clearance Estimated Creatinine Clearance: 105.8 mL/min (by C-G formula based on SCr of 0.91 mg/dL).  Coag   Results from last 7 days   Lab Units 08/23/24  0306 08/22/24  1112 08/22/24  0942 08/21/24  0744   INR  1.05 1.11* 1.34* 0.98   APTT seconds  --  22.9* 21.1* 25.0     HbA1C   Lab Results   Component Value Date    HGBA1C 6.36 (H) 08/21/2024    HGBA1C 5.7 (H) 10/18/2022    HGBA1C 5.8 (H) 10/05/2020     Blood Glucose   Glucose   Date/Time Value Ref Range Status   08/23/2024 1329 181 (H) 70 - 105 mg/dL Final     Comment:     Serial Number: [id number]:  [id number]   08/23/2024 1202 149 (H) 70 - 105 mg/dL Final     Comment:     Serial Number: [id number]:  [id number]   08/23/2024 1039 196 (H) 70 - 105 mg/dL Final     Comment:     Serial Number: [id number]:  [id number]   08/23/2024 0909 188 (H) 70 - 105 mg/dL Final     Comment:     Serial Number: [id number]:  [id number]   08/23/2024 0717 167 (H) 70 - 105 mg/dL Final     Comment:     Serial Number: [id number]:  [id number]   08/23/2024 0501 180 (H) 70 - 105 mg/dL Final     Comment:     Serial Number: [id number]:  [id number]   08/23/2024 0310 177 (H) 70 - 105 mg/dL Final     Comment:     Serial Number: [id number]:  [id number]   08/23/2024 0208 165 (H) 70 - 105 mg/dL Final     Comment:     Serial Number: [id number]:  [id number]     Infection     CMP   Results from last 7 days   Lab Units 08/23/24  0306 08/22/24  1443 08/22/24  1339 08/22/24  1238 08/22/24  1143 08/22/24  1137  "08/22/24  1112 08/21/24  0744   SODIUM mmol/L 136  --   --   --   --   --  139 141   POTASSIUM mmol/L 4.0  --   --   --   --   --  4.4 4.1   CHLORIDE mmol/L 104  --   --   --   --   --  107 102   CO2 mmol/L 21.1*  --   --   --   --   --  21.4* 26.6   BUN mg/dL 14  --   --   --   --   --  11 15   CREATININE mg/dL 0.91 0.86 0.86 0.84 0.76 0.91 0.96 0.98   GLUCOSE mg/dL 162*  --   --   --   --   --  111* 132*   ALBUMIN g/dL 4.4  --   --   --   --   --  4.5 4.8   BILIRUBIN mg/dL  --   --   --   --   --   --   --  0.9   ALK PHOS U/L  --   --   --   --   --   --   --  83   AST (SGOT) U/L  --   --   --   --   --   --   --  32   ALT (SGPT) U/L  --   --   --   --   --   --   --  42*     ABG    Results from last 7 days   Lab Units 08/22/24  1707 08/22/24  1525 08/22/24  1443 08/22/24  1339 08/22/24  1238 08/22/24  1143 08/22/24  1137 08/22/24  0947   PH, ARTERIAL pH units 7.334* 7.352 7.393 7.433 7.425  --  7.398 7.250*   PCO2, ARTERIAL mm Hg 42.7 41.3 37.0 31.2* 32.0*  --  36.9 54.6*   PO2 ART mm Hg 93.3 145.6* 131.2* 134.0* 158.9*  --  133.9* 383.0*   O2 SATURATION ART % 96.7 99.1* 99.0* 99.2* 99.5*  --  99.1* 100.0*   BASE EXCESS ART mmol/L -3.1* -2.6* -2.0* -2.5* -2.7* -3.1* -1.8* <0.0*     UA    Results from last 7 days   Lab Units 08/21/24  0738   NITRITE UA  Negative   WBC UA /HPF 0-2   BACTERIA UA /HPF None Seen   SQUAM EPITHEL UA /HPF 0-2     JOSE ANTONIO  No results found for: \"POCMETH\", \"POCAMPHET\", \"POCBARBITUR\", \"POCBENZO\", \"POCCOCAINE\", \"POCOPIATES\", \"POCOXYCODO\", \"POCPHENCYC\", \"POCPROPOXY\", \"POCTHC\", \"POCTRICYC\"  Lysis Labs   Results from last 7 days   Lab Units 08/23/24  0306 08/22/24  1707 08/22/24  1658 08/22/24  1525 08/22/24  1443 08/22/24  1339 08/22/24  1238 08/22/24  1143 08/22/24  1137 08/22/24  1112 08/22/24  0947 08/22/24  0942 08/22/24  0905 08/22/24  0856 08/22/24  0740 08/21/24  0744   INR  1.05  --   --   --   --   --   --   --   --  1.11*  --  1.34*  --   --   --  0.98   APTT seconds  --   --   --   --   " --   --   --   --   --  22.9*  --  21.1*  --   --   --  25.0   FIBRINOGEN mg/dL  --   --   --   --   --   --   --   --   --   --   --  211  --  214  --   --    HEMOGLOBIN g/dL 11.4*  --  11.7*  --   --   --   --   --   --  12.1*  --  11.1*  --  11.1*  --  14.7   HEMOGLOBIN, POC g/dL  --  11.0*  --  11.9* 11.5* 12.4 12.2 11.7* 12.3  --    < >  --    < >  --    < >  --    PLATELETS 10*3/mm3 143  --  150  --   --   --   --   --   --  127*  --  107*  --  194  --  184   CREATININE mg/dL 0.91  --   --   --  0.86 0.86 0.84 0.76 0.91 0.96  --   --   --   --   --  0.98    < > = values in this interval not displayed.     Radiology(recent) XR Chest 1 View    Result Date: 8/23/2024  Impression: 1. Interval extubation and removal of esophagogastric tube. 2. Stable right IJ [place]-Alan catheter. 3. Hypoinflated lungs with bibasilar airspace disease left greater than right likely atelectasis. 4. No pneumothorax. Electronically Signed: Wil Rodriguez MD  8/23/2024 7:14 AM EDT  Workstation ID: [id number]    XR Chest 1 View    Result Date: 8/22/2024  Impression: 1.Lines and tubes as noted. 2.Atelectatic change right lower chest. There is some prominence of markings left infrahilar area that might reflect some atelectasis in this area. Electronically Signed: Rodrigo Bhatia MD  8/22/2024 11:42 AM EDT  Workstation ID: [id number]       Results from last 7 days   Lab Units 08/23/24  0910   HSTROP T ng/L 215*       Imaging Results (Last 24 Hours)       Procedure Component Value Units Date/Time    XR Chest 1 View [[id number]] Collected: 08/23/24 0713     Updated: 08/23/24 0716    Narrative:      XR CHEST 1 VW    Date of Exam: 8/23/2024 5:55 AM EDT    Indication: Post-Op Heart Surgery    Comparison: 8/22/2024    Findings:  Interval extubation and removal of esophagogastric tube. Right IJ [place]-Alan catheter stable in expected position. Postsurgical changes of sternotomy and CABG. Left atrial appendage clip. Lungs are hypoinflated with bibasilar airspace  opacities left   greater than right likely atelectasis. No significant effusion. Negative for pneumothorax. Stable chest tubes overlying the midline.      Impression:      Impression:  1. Interval extubation and removal of esophagogastric tube.  2. Stable right IJ [place]-Alan catheter.  3. Hypoinflated lungs with bibasilar airspace disease left greater than right likely atelectasis.  4. No pneumothorax.        Electronically Signed: Wil Rodriguez MD    8/23/2024 7:14 AM EDT    Workstation ID: [id number]            Cardiac Studies:  Echo- Results for orders placed in visit on 08/22/24    Intra-Op Anesthesia CHACE    Narrative  Intra-Op Anesthesia CHACE    Procedure Performed: Intra-Op Anesthesia CHACE  Start Time:  End Time:    Preanesthesia Checklist:  Patient identified, IV assessed, risks and benefits discussed, monitors and equipment assessed, procedure being performed at surgeon's request and anesthesia consent obtained.    General Procedure Information  Diagnostic Indications for Echo:  assessment of surgical repair and hemodynamic monitoring  Location performed:  OR  Intubated  Bite block placed  Heart visualized  Probe Insertion:  Easy  Probe Type:  Biplane and multiplane  Modalities:  Color flow mapping, pulse wave Doppler and continuous wave Doppler    Echocardiographic and Doppler Measurements    Ventricles    Right Ventricle:  Cavity size normal.  Hypertrophy not present.  Thrombus not present.  Global function normal.  Ejection Fraction 60%.  Left Ventricle:  Cavity size normal.  Thrombus not present.  Global Function normal.  Ejection Fraction 60%.    Ventricular Regional Function:  1- Basal Anteroseptal:  normal  2- Basal Anterior:  normal  3- Basal Anterolateral:  normal  4- Basal Inferolateral:  normal  5- Basal Inferior:  normal  6- Basal Inferoseptal:  normal  7- Mid Anteroseptal:  normal  8- Mid Anterior:  normal  9- Mid Anterolateral:  normal  10- Mid Inferolateral:  normal  11- Mid Inferior:  normal  12-  Mid Inferoseptal:  normal  13- Apical Anterior:  normal  14- Apical Lateral:  normal  15- Apical Inferior:  normal  16- Apical Septal:  normal  17- [place]:  normal      Valves    Aortic Valve:  Annulus normal.  Stenosis mild.  Regurgitation absent.  Leaflets bicuspid.  Leaflet motions restricted.    Mitral Valve:  Annulus normal.  Stenosis not present.  Regurgitation absent.  Leaflets normal.  Leaflet motions normal.    Tricuspid Valve:  Annulus normal.  Stenosis not present.  Regurgitation absent.  Leaflets normal.  Leaflet motions normal.  Pulmonic Valve:  Annulus normal.  Stenosis not present.  Regurgitation absent.      Aorta    Ascending Aorta:  Size normal.  Dissection not present.  Plaque thickness less than 3 mm.  Mobile plaque not present.  Aortic Arch:  Size normal.  Dissection not present.  Plaque thickness less than 3 mm.  Mobile plaque not present.  Descending Aorta:  Size normal.  Dissection not present.  Plaque thickness less than 3 mm.  Mobile plaque not present.      Atria    Right Atrium:  Size normal.  Left Atrium:  Size dilated.  Spontaneous echo contrast not present.  Thrombus not present.  Tumor not present.  Device not present.  Left atrial appendage normal.      Septa    Atrial Septum:  Intra-atrial septal morphology contains patent foramen ovale.  Patent foramen ovale shunts left to right.    Ventricular Septum:  Intra-ventricular septum morphology normal.        Other Findings  Pericardium:  normal  Pleural Effusion:  none  Pulmonary Arteries:  normal      Anesthesia Information  Performed Personally  Anesthesiologist:  Jethro Swan MD      Echocardiogram Comments:  CHACE placed without resistance, lubricated, bite guard      Pre cpb CHACE  LV no WMA EF 60  RV nl SF  MV wnl  TV wnl  RA dilated  AV bicuspid mild eccentric AI (toward AML) . Dilated aortic root 3.9 CM STJ 4cm asc aorta 4.4 cm taper to nl in distal arch      Post cpb AV repair, asc aorta replace  LV RV no change  MV TV no  change  AV bicuspid repair, no AI    Stress Myoview-  Cath-        Yandel Alcazar,   08/23/24  14:29 EDT    Copied information has been reviewed and is current as of 08/23/24

## 2024-08-24 ENCOUNTER — APPOINTMENT (OUTPATIENT)
Dept: GENERAL RADIOLOGY | Facility: HOSPITAL | Age: 59
DRG: 220 | End: 2024-08-24
Payer: COMMERCIAL

## 2024-08-24 LAB
ANION GAP SERPL CALCULATED.3IONS-SCNC: 10.1 MMOL/L (ref 5–15)
BUN SERPL-MCNC: 17 MG/DL (ref 6–20)
BUN/CREAT SERPL: 17.7 (ref 7–25)
CALCIUM SPEC-SCNC: 8.6 MG/DL (ref 8.6–10.5)
CHLORIDE SERPL-SCNC: 98 MMOL/L (ref 98–107)
CO2 SERPL-SCNC: 23.9 MMOL/L (ref 22–29)
CREAT SERPL-MCNC: 0.96 MG/DL (ref 0.76–1.27)
DEPRECATED RDW RBC AUTO: 43.8 FL (ref 37–54)
EGFRCR SERPLBLD CKD-EPI 2021: 91.6 ML/MIN/1.73
ERYTHROCYTE [DISTWIDTH] IN BLOOD BY AUTOMATED COUNT: 13.3 % (ref 12.3–15.4)
GLUCOSE BLDC GLUCOMTR-MCNC: 136 MG/DL (ref 70–105)
GLUCOSE BLDC GLUCOMTR-MCNC: 139 MG/DL (ref 70–105)
GLUCOSE BLDC GLUCOMTR-MCNC: 139 MG/DL (ref 70–105)
GLUCOSE BLDC GLUCOMTR-MCNC: 154 MG/DL (ref 70–105)
GLUCOSE BLDC GLUCOMTR-MCNC: 159 MG/DL (ref 70–105)
GLUCOSE BLDC GLUCOMTR-MCNC: 159 MG/DL (ref 70–105)
GLUCOSE BLDC GLUCOMTR-MCNC: 161 MG/DL (ref 70–105)
GLUCOSE BLDC GLUCOMTR-MCNC: 165 MG/DL (ref 70–105)
GLUCOSE BLDC GLUCOMTR-MCNC: 171 MG/DL (ref 70–105)
GLUCOSE SERPL-MCNC: 129 MG/DL (ref 65–99)
HCT VFR BLD AUTO: 37 % (ref 37.5–51)
HGB BLD-MCNC: 11.7 G/DL (ref 13–17.7)
MCH RBC QN AUTO: 28.3 PG (ref 26.6–33)
MCHC RBC AUTO-ENTMCNC: 31.6 G/DL (ref 31.5–35.7)
MCV RBC AUTO: 89.6 FL (ref 79–97)
PLATELET # BLD AUTO: 149 10*3/MM3 (ref 140–450)
PMV BLD AUTO: 10.5 FL (ref 6–12)
POTASSIUM SERPL-SCNC: 4.3 MMOL/L (ref 3.5–5.2)
RBC # BLD AUTO: 4.13 10*6/MM3 (ref 4.14–5.8)
SODIUM SERPL-SCNC: 132 MMOL/L (ref 136–145)
WBC NRBC COR # BLD AUTO: 15.76 10*3/MM3 (ref 3.4–10.8)

## 2024-08-24 PROCEDURE — 25010000002 MORPHINE PER 10 MG: Performed by: NURSE PRACTITIONER

## 2024-08-24 PROCEDURE — 82948 REAGENT STRIP/BLOOD GLUCOSE: CPT

## 2024-08-24 PROCEDURE — 25010000002 ENOXAPARIN PER 10 MG: Performed by: NURSE PRACTITIONER

## 2024-08-24 PROCEDURE — 93010 ELECTROCARDIOGRAM REPORT: CPT | Performed by: INTERNAL MEDICINE

## 2024-08-24 PROCEDURE — 93005 ELECTROCARDIOGRAM TRACING: CPT | Performed by: NURSE PRACTITIONER

## 2024-08-24 PROCEDURE — 25010000002 CEFAZOLIN PER 500 MG: Performed by: NURSE PRACTITIONER

## 2024-08-24 PROCEDURE — 85027 COMPLETE CBC AUTOMATED: CPT | Performed by: NURSE PRACTITIONER

## 2024-08-24 PROCEDURE — 82948 REAGENT STRIP/BLOOD GLUCOSE: CPT | Performed by: THORACIC SURGERY (CARDIOTHORACIC VASCULAR SURGERY)

## 2024-08-24 PROCEDURE — C1751 CATH, INF, PER/CENT/MIDLINE: HCPCS

## 2024-08-24 PROCEDURE — 80048 BASIC METABOLIC PNL TOTAL CA: CPT | Performed by: NURSE PRACTITIONER

## 2024-08-24 PROCEDURE — 99232 SBSQ HOSP IP/OBS MODERATE 35: CPT | Performed by: INTERNAL MEDICINE

## 2024-08-24 PROCEDURE — 71045 X-RAY EXAM CHEST 1 VIEW: CPT

## 2024-08-24 PROCEDURE — 63710000001 INSULIN LISPRO (HUMAN) PER 5 UNITS: Performed by: THORACIC SURGERY (CARDIOTHORACIC VASCULAR SURGERY)

## 2024-08-24 PROCEDURE — 99024 POSTOP FOLLOW-UP VISIT: CPT | Performed by: THORACIC SURGERY (CARDIOTHORACIC VASCULAR SURGERY)

## 2024-08-24 RX ORDER — NICOTINE POLACRILEX 4 MG
15 LOZENGE BUCCAL
Status: DISCONTINUED | OUTPATIENT
Start: 2024-08-24 | End: 2024-08-27 | Stop reason: HOSPADM

## 2024-08-24 RX ORDER — FUROSEMIDE 20 MG
20 TABLET ORAL DAILY
Status: DISCONTINUED | OUTPATIENT
Start: 2024-08-24 | End: 2024-08-26

## 2024-08-24 RX ORDER — IBUPROFEN 600 MG/1
1 TABLET ORAL
Status: DISCONTINUED | OUTPATIENT
Start: 2024-08-24 | End: 2024-08-27 | Stop reason: HOSPADM

## 2024-08-24 RX ORDER — DEXTROSE MONOHYDRATE 25 G/50ML
25 INJECTION, SOLUTION INTRAVENOUS
Status: DISCONTINUED | OUTPATIENT
Start: 2024-08-24 | End: 2024-08-27 | Stop reason: HOSPADM

## 2024-08-24 RX ORDER — INSULIN LISPRO 100 [IU]/ML
2-7 INJECTION, SOLUTION INTRAVENOUS; SUBCUTANEOUS
Status: DISCONTINUED | OUTPATIENT
Start: 2024-08-24 | End: 2024-08-27 | Stop reason: HOSPADM

## 2024-08-24 RX ADMIN — PANTOPRAZOLE SODIUM 40 MG: 40 TABLET, DELAYED RELEASE ORAL at 05:44

## 2024-08-24 RX ADMIN — TAMSULOSIN HYDROCHLORIDE 0.4 MG: 0.4 CAPSULE ORAL at 08:13

## 2024-08-24 RX ADMIN — MUPIROCIN 1 APPLICATION: 20 OINTMENT TOPICAL at 20:29

## 2024-08-24 RX ADMIN — CYCLOBENZAPRINE 5 MG: 10 TABLET, FILM COATED ORAL at 01:01

## 2024-08-24 RX ADMIN — MORPHINE SULFATE 2 MG: 2 INJECTION, SOLUTION INTRAMUSCULAR; INTRAVENOUS at 01:01

## 2024-08-24 RX ADMIN — SENNOSIDES AND DOCUSATE SODIUM 2 TABLET: 50; 8.6 TABLET ORAL at 20:29

## 2024-08-24 RX ADMIN — SODIUM CHLORIDE 2 G: 900 INJECTION INTRAVENOUS at 01:02

## 2024-08-24 RX ADMIN — POLYETHYLENE GLYCOL 3350 17 G: 17 POWDER, FOR SOLUTION ORAL at 20:30

## 2024-08-24 RX ADMIN — CYCLOBENZAPRINE 5 MG: 10 TABLET, FILM COATED ORAL at 10:47

## 2024-08-24 RX ADMIN — MORPHINE SULFATE 2 MG: 2 INJECTION, SOLUTION INTRAMUSCULAR; INTRAVENOUS at 08:25

## 2024-08-24 RX ADMIN — Medication 12.5 MG: at 08:13

## 2024-08-24 RX ADMIN — ATORVASTATIN CALCIUM 40 MG: 40 TABLET, FILM COATED ORAL at 20:30

## 2024-08-24 RX ADMIN — ENOXAPARIN SODIUM 40 MG: 100 INJECTION SUBCUTANEOUS at 15:56

## 2024-08-24 RX ADMIN — COLCHICINE 0.6 MG: 0.6 TABLET, FILM COATED ORAL at 20:30

## 2024-08-24 RX ADMIN — OXYCODONE HYDROCHLORIDE 5 MG: 5 TABLET ORAL at 05:45

## 2024-08-24 RX ADMIN — ASPIRIN 81 MG: 81 TABLET, COATED ORAL at 08:13

## 2024-08-24 RX ADMIN — CHLORHEXIDINE GLUCONATE, 0.12% ORAL RINSE 15 ML: 1.2 SOLUTION DENTAL at 08:13

## 2024-08-24 RX ADMIN — CYCLOBENZAPRINE 5 MG: 10 TABLET, FILM COATED ORAL at 20:44

## 2024-08-24 RX ADMIN — HYDROCODONE BITARTRATE AND ACETAMINOPHEN 1 TABLET: 5; 325 TABLET ORAL at 16:43

## 2024-08-24 RX ADMIN — OXYCODONE HYDROCHLORIDE 5 MG: 5 TABLET ORAL at 09:27

## 2024-08-24 RX ADMIN — MUPIROCIN 1 APPLICATION: 20 OINTMENT TOPICAL at 08:13

## 2024-08-24 RX ADMIN — MORPHINE SULFATE 2 MG: 2 INJECTION, SOLUTION INTRAMUSCULAR; INTRAVENOUS at 11:05

## 2024-08-24 RX ADMIN — POLYETHYLENE GLYCOL 3350 17 G: 17 POWDER, FOR SOLUTION ORAL at 08:13

## 2024-08-24 RX ADMIN — SENNOSIDES AND DOCUSATE SODIUM 2 TABLET: 50; 8.6 TABLET ORAL at 08:13

## 2024-08-24 RX ADMIN — FUROSEMIDE 20 MG: 20 TABLET ORAL at 08:13

## 2024-08-24 RX ADMIN — HYDROCODONE BITARTRATE AND ACETAMINOPHEN 1 TABLET: 5; 325 TABLET ORAL at 12:48

## 2024-08-24 RX ADMIN — INSULIN LISPRO 2 UNITS: 100 INJECTION, SOLUTION INTRAVENOUS; SUBCUTANEOUS at 20:29

## 2024-08-24 RX ADMIN — Medication 12.5 MG: at 20:29

## 2024-08-24 RX ADMIN — COLCHICINE 0.6 MG: 0.6 TABLET, FILM COATED ORAL at 08:13

## 2024-08-24 RX ADMIN — CHLORHEXIDINE GLUCONATE, 0.12% ORAL RINSE 15 ML: 1.2 SOLUTION DENTAL at 20:29

## 2024-08-24 RX ADMIN — MORPHINE SULFATE 2 MG: 2 INJECTION, SOLUTION INTRAMUSCULAR; INTRAVENOUS at 15:56

## 2024-08-24 RX ADMIN — HYDROCODONE BITARTRATE AND ACETAMINOPHEN 1 TABLET: 5; 325 TABLET ORAL at 20:44

## 2024-08-24 NOTE — PLAN OF CARE
Problem: Adult Inpatient Plan of Care  Goal: Plan of Care Review  Outcome: Ongoing, Progressing  Flowsheets (Taken 8/24/2024 5418)  Progress: improving  Plan of Care Reviewed With:   patient   spouse   daughter  Outcome Evaluation: POD #2 RIJ Cordis, f/c, and left mediastinal CT all d/c'd. Periaortic drain to YULIET bulb sx. Pt has ambulated x 2 in the hallway. AV wires isolated. Still requiring 3L O2, encouraging IS use. Recieved Lasix 20mg PO.   Goal Outcome Evaluation:

## 2024-08-24 NOTE — PROGRESS NOTES
CC--- post ascending aortic and aortic valve surgery postop day 1    Sub  Patient clinically doing well being managed by CT surgery and denies any symptoms except for incisional discomfort      Past Medical History:   Diagnosis Date    Aortic insufficiency due to bicuspid aortic valve     Ascending aortic aneurysm     Bicuspid aortic valve     Heart murmur     Hyperlipidemia     Hypertension      Past Surgical History:   Procedure Laterality Date    APPENDECTOMY      CARDIAC CATHETERIZATION Right 6/17/2024    Procedure: Left Heart Cath, RADIAL;  Surgeon: Brittanie Lopez MD;  Location: [facility] CATH INVASIVE LOCATION;  Service: Cardiovascular;  Laterality: Right;  cath in June of 2024       Physical Exam    General:      well developed, well nourished, in no acute distress.    Head:      normocephalic and atraumatic.    Eyes:      PERRL/EOM intact, conjunctivae and sclerae clear without nystagmus.    Neck:      no  thyromegaly, trachea central with normal respiratory effort  Lungs:      Reduced  breath sounds in bases  Heart:       regular rate and rhythm, S1, S2 without murmurs, rubs, or gallops  Skin:      intact without lesions or rashes.    Psych:      alert and cooperative; normal mood and affect; normal attention span and concentration.              CBC    Results from last 7 days   Lab Units 08/24/24  0422 08/23/24  0306 08/22/24  1707 08/22/24  1658 08/22/24  1525 08/22/24  1443 08/22/24  1339 08/22/24  1137 08/22/24  1112 08/22/24  0947 08/22/24  0942 08/22/24  0905 08/22/24  0856 08/22/24  0740 08/21/24  0744   WBC 10*3/mm3 15.76* 14.40*  --  14.62*  --   --   --   --  14.14*  --  12.20*  --  12.26*  --  6.62   HEMOGLOBIN g/dL 11.7* 11.4*  --  11.7*  --   --   --   --  12.1*  --  11.1*  --  11.1*  --  14.7   HEMOGLOBIN, POC g/dL  --   --  11.0*  --  11.9* 11.5* 12.4   < >  --    < >  --    < >  --    < >  --    PLATELETS 10*3/mm3 149 143  --  150  --   --   --   --  127*  --  107*  --  194  --  184     < > = values in this interval not displayed.     BMP   Results from last 7 days   Lab Units 08/24/24  0422 08/23/24  0306 08/22/24  1443 08/22/24  1339 08/22/24  1238 08/22/24  1143 08/22/24  1137 08/22/24  1112 08/21/24  0744   SODIUM mmol/L 132* 136  --   --   --   --   --  139 141   POTASSIUM mmol/L 4.3 4.0  --   --   --   --   --  4.4 4.1   CHLORIDE mmol/L 98 104  --   --   --   --   --  107 102   CO2 mmol/L 23.9 21.1*  --   --   --   --   --  21.4* 26.6   BUN mg/dL 17 14  --   --   --   --   --  11 15   CREATININE mg/dL 0.96 0.91 0.86 0.86 0.84 0.76 0.91 0.96 0.98   GLUCOSE mg/dL 129* 162*  --   --   --   --   --  111* 132*   MAGNESIUM mg/dL  --  2.9*  --   --   --   --   --   --   --    PHOSPHORUS mg/dL  --  4.9*  --   --   --   --   --  1.4*  --      Radiology(recent) XR Chest 1 View    Result Date: 8/24/2024  Impression: Interval removal of left-sided mediastinal drain. Otherwise stable lines and tubes. Similar pulmonary vascular congestion with bibasal opacities and small bilateral pleural effusions accounting for differences in lung volume. Electronically Signed: Alvaro Dewey  8/24/2024 11:44 AM EDT  Workstation ID: [id number]    XR Chest 1 View    Result Date: 8/24/2024  Impression: 1. Removal of [place]-Alan catheter with stable right IJ vascular sheath. 2. Stable midline chest tube. No pneumothorax. 3. Cardiomegaly with increased central pulmonary vascular congestion. 4. Worsening bibasilar airspace opacities which may relate to atelectasis with small bilateral pleural effusions. Electronically Signed: Wil Rodriguez MD  8/24/2024 7:36 AM EDT  Workstation ID: [id number]    XR Chest 1 View    Result Date: 8/23/2024  Impression: 1. Interval extubation and removal of esophagogastric tube. 2. Stable right IJ [place]-Alan catheter. 3. Hypoinflated lungs with bibasilar airspace disease left greater than right likely atelectasis. 4. No pneumothorax. Electronically Signed: Wil Rodriguez MD  8/23/2024 7:14 AM EDT   Workstation ID: [id number]     Assessment and plan    Aneurysm of ascending aorta    Nonrheumatic aortic valve insufficiency    Thoracic aortic aneurysm without rupture  Hypertension hyperlipidemia    Status post surgery for ascending aorta and aortic valve doing remarkably well being managed by CT surgery and continue current management      Electronically signed by Tex Arreola MD, 08/24/24, 2:11 PM EDT.

## 2024-08-24 NOTE — PLAN OF CARE
Goal Outcome Evaluation:  Plan of Care Reviewed With: patient         Pt worked on IS throughout night, was able to rest per pt. Pt on 2L NC.

## 2024-08-25 ENCOUNTER — APPOINTMENT (OUTPATIENT)
Dept: GENERAL RADIOLOGY | Facility: HOSPITAL | Age: 59
DRG: 220 | End: 2024-08-25
Payer: COMMERCIAL

## 2024-08-25 LAB
ANION GAP SERPL CALCULATED.3IONS-SCNC: 8.6 MMOL/L (ref 5–15)
BUN SERPL-MCNC: 20 MG/DL (ref 6–20)
BUN/CREAT SERPL: 21.3 (ref 7–25)
CALCIUM SPEC-SCNC: 8.6 MG/DL (ref 8.6–10.5)
CHLORIDE SERPL-SCNC: 97 MMOL/L (ref 98–107)
CO2 SERPL-SCNC: 26.4 MMOL/L (ref 22–29)
CREAT SERPL-MCNC: 0.94 MG/DL (ref 0.76–1.27)
DEPRECATED RDW RBC AUTO: 43 FL (ref 37–54)
EGFRCR SERPLBLD CKD-EPI 2021: 94 ML/MIN/1.73
ERYTHROCYTE [DISTWIDTH] IN BLOOD BY AUTOMATED COUNT: 13.2 % (ref 12.3–15.4)
GLUCOSE BLDC GLUCOMTR-MCNC: 138 MG/DL (ref 70–105)
GLUCOSE BLDC GLUCOMTR-MCNC: 141 MG/DL (ref 70–105)
GLUCOSE BLDC GLUCOMTR-MCNC: 142 MG/DL (ref 70–105)
GLUCOSE BLDC GLUCOMTR-MCNC: 149 MG/DL (ref 70–105)
GLUCOSE BLDC GLUCOMTR-MCNC: 152 MG/DL (ref 70–105)
GLUCOSE SERPL-MCNC: 122 MG/DL (ref 65–99)
HCT VFR BLD AUTO: 35.1 % (ref 37.5–51)
HGB BLD-MCNC: 11.5 G/DL (ref 13–17.7)
MCH RBC QN AUTO: 29 PG (ref 26.6–33)
MCHC RBC AUTO-ENTMCNC: 32.8 G/DL (ref 31.5–35.7)
MCV RBC AUTO: 88.4 FL (ref 79–97)
PLATELET # BLD AUTO: 144 10*3/MM3 (ref 140–450)
PMV BLD AUTO: 10.3 FL (ref 6–12)
POTASSIUM SERPL-SCNC: 4.1 MMOL/L (ref 3.5–5.2)
RBC # BLD AUTO: 3.97 10*6/MM3 (ref 4.14–5.8)
SODIUM SERPL-SCNC: 132 MMOL/L (ref 136–145)
WBC NRBC COR # BLD AUTO: 11.25 10*3/MM3 (ref 3.4–10.8)

## 2024-08-25 PROCEDURE — 85027 COMPLETE CBC AUTOMATED: CPT | Performed by: NURSE PRACTITIONER

## 2024-08-25 PROCEDURE — 82948 REAGENT STRIP/BLOOD GLUCOSE: CPT

## 2024-08-25 PROCEDURE — 25010000002 ENOXAPARIN PER 10 MG: Performed by: NURSE PRACTITIONER

## 2024-08-25 PROCEDURE — 25010000002 KETOROLAC TROMETHAMINE PER 15 MG: Performed by: THORACIC SURGERY (CARDIOTHORACIC VASCULAR SURGERY)

## 2024-08-25 PROCEDURE — 99232 SBSQ HOSP IP/OBS MODERATE 35: CPT | Performed by: INTERNAL MEDICINE

## 2024-08-25 PROCEDURE — 71045 X-RAY EXAM CHEST 1 VIEW: CPT

## 2024-08-25 PROCEDURE — 80048 BASIC METABOLIC PNL TOTAL CA: CPT | Performed by: NURSE PRACTITIONER

## 2024-08-25 PROCEDURE — 82948 REAGENT STRIP/BLOOD GLUCOSE: CPT | Performed by: THORACIC SURGERY (CARDIOTHORACIC VASCULAR SURGERY)

## 2024-08-25 PROCEDURE — 99024 POSTOP FOLLOW-UP VISIT: CPT | Performed by: THORACIC SURGERY (CARDIOTHORACIC VASCULAR SURGERY)

## 2024-08-25 RX ORDER — GABAPENTIN 100 MG/1
100 CAPSULE ORAL 2 TIMES DAILY
Status: DISCONTINUED | OUTPATIENT
Start: 2024-08-25 | End: 2024-08-27 | Stop reason: HOSPADM

## 2024-08-25 RX ORDER — KETOROLAC TROMETHAMINE 30 MG/ML
15 INJECTION, SOLUTION INTRAMUSCULAR; INTRAVENOUS EVERY 6 HOURS
Status: COMPLETED | OUTPATIENT
Start: 2024-08-25 | End: 2024-08-25

## 2024-08-25 RX ADMIN — GABAPENTIN 100 MG: 100 CAPSULE ORAL at 20:45

## 2024-08-25 RX ADMIN — HYDROCODONE BITARTRATE AND ACETAMINOPHEN 1 TABLET: 5; 325 TABLET ORAL at 09:54

## 2024-08-25 RX ADMIN — MUPIROCIN 1 APPLICATION: 20 OINTMENT TOPICAL at 08:36

## 2024-08-25 RX ADMIN — CHLORHEXIDINE GLUCONATE, 0.12% ORAL RINSE 15 ML: 1.2 SOLUTION DENTAL at 20:46

## 2024-08-25 RX ADMIN — COLCHICINE 0.6 MG: 0.6 TABLET, FILM COATED ORAL at 20:45

## 2024-08-25 RX ADMIN — TAMSULOSIN HYDROCHLORIDE 0.4 MG: 0.4 CAPSULE ORAL at 08:35

## 2024-08-25 RX ADMIN — GABAPENTIN 100 MG: 100 CAPSULE ORAL at 08:35

## 2024-08-25 RX ADMIN — Medication 12.5 MG: at 08:35

## 2024-08-25 RX ADMIN — MUPIROCIN 1 APPLICATION: 20 OINTMENT TOPICAL at 20:45

## 2024-08-25 RX ADMIN — PANTOPRAZOLE SODIUM 40 MG: 40 TABLET, DELAYED RELEASE ORAL at 05:31

## 2024-08-25 RX ADMIN — HYDROCODONE BITARTRATE AND ACETAMINOPHEN 1 TABLET: 5; 325 TABLET ORAL at 14:01

## 2024-08-25 RX ADMIN — CYCLOBENZAPRINE 5 MG: 10 TABLET, FILM COATED ORAL at 22:35

## 2024-08-25 RX ADMIN — ATORVASTATIN CALCIUM 40 MG: 40 TABLET, FILM COATED ORAL at 20:45

## 2024-08-25 RX ADMIN — POLYETHYLENE GLYCOL 3350 17 G: 17 POWDER, FOR SOLUTION ORAL at 08:35

## 2024-08-25 RX ADMIN — KETOROLAC TROMETHAMINE 15 MG: 30 INJECTION, SOLUTION INTRAMUSCULAR at 14:01

## 2024-08-25 RX ADMIN — COLCHICINE 0.6 MG: 0.6 TABLET, FILM COATED ORAL at 08:35

## 2024-08-25 RX ADMIN — CYCLOBENZAPRINE 5 MG: 10 TABLET, FILM COATED ORAL at 09:54

## 2024-08-25 RX ADMIN — KETOROLAC TROMETHAMINE 15 MG: 30 INJECTION, SOLUTION INTRAMUSCULAR at 08:34

## 2024-08-25 RX ADMIN — ENOXAPARIN SODIUM 40 MG: 100 INJECTION SUBCUTANEOUS at 18:03

## 2024-08-25 RX ADMIN — KETOROLAC TROMETHAMINE 15 MG: 30 INJECTION, SOLUTION INTRAMUSCULAR at 20:45

## 2024-08-25 RX ADMIN — ASPIRIN 81 MG: 81 TABLET, COATED ORAL at 08:35

## 2024-08-25 RX ADMIN — HYDROCODONE BITARTRATE AND ACETAMINOPHEN 1 TABLET: 5; 325 TABLET ORAL at 18:03

## 2024-08-25 RX ADMIN — HYDROCODONE BITARTRATE AND ACETAMINOPHEN 1 TABLET: 5; 325 TABLET ORAL at 22:35

## 2024-08-25 RX ADMIN — HYDROCODONE BITARTRATE AND ACETAMINOPHEN 1 TABLET: 5; 325 TABLET ORAL at 05:56

## 2024-08-25 RX ADMIN — FUROSEMIDE 20 MG: 20 TABLET ORAL at 08:35

## 2024-08-25 RX ADMIN — SENNOSIDES AND DOCUSATE SODIUM 2 TABLET: 50; 8.6 TABLET ORAL at 08:35

## 2024-08-25 NOTE — PROGRESS NOTES
CC--- post ascending aortic and aortic valve surgery postop day 1    Sub  Patient clinically doing well being managed by CT surgery and denies any symptoms except for incisional discomfort  Chest tube to be removed today      Past Medical History:   Diagnosis Date    Aortic insufficiency due to bicuspid aortic valve     Ascending aortic aneurysm     Bicuspid aortic valve     Heart murmur     Hyperlipidemia     Hypertension      Past Surgical History:   Procedure Laterality Date    APPENDECTOMY      CARDIAC CATHETERIZATION Right 6/17/2024    Procedure: Left Heart Cath, RADIAL;  Surgeon: Brittanie Lopez MD;  Location: [facility] CATH INVASIVE LOCATION;  Service: Cardiovascular;  Laterality: Right;  cath in June of 2024       Physical Exam    General:      well developed, well nourished, in no acute distress.    Head:      normocephalic and atraumatic.    Eyes:      PERRL/EOM intact, conjunctivae and sclerae clear without nystagmus.    Neck:      no  thyromegaly, trachea central with normal respiratory effort  Lungs:      Reduced  breath sounds in bases  Heart:       regular rate and rhythm, S1, S2 without murmurs, rubs, or gallops  Skin:      intact without lesions or rashes.    Psych:      alert and cooperative; normal mood and affect; normal attention span and concentration.      Physical exam unchanged         CBC    Results from last 7 days   Lab Units 08/25/24  0432 08/24/24  0422 08/23/24  0306 08/22/24  1707 08/22/24  1658 08/22/24  1525 08/22/24  1443 08/22/24  1137 08/22/24  1112 08/22/24  0947 08/22/24  0942 08/22/24  0905 08/22/24  0856   WBC 10*3/mm3 11.25* 15.76* 14.40*  --  14.62*  --   --   --  14.14*  --  12.20*  --  12.26*   HEMOGLOBIN g/dL 11.5* 11.7* 11.4*  --  11.7*  --   --   --  12.1*  --  11.1*  --  11.1*   HEMOGLOBIN, POC g/dL  --   --   --  11.0*  --  11.9* 11.5*   < >  --    < >  --    < >  --    PLATELETS 10*3/mm3 144 149 143  --  150  --   --   --  127*  --  107*  --  194    < > =  values in this interval not displayed.     BMP   Results from last 7 days   Lab Units 08/25/24  0432 08/24/24  0422 08/23/24  0306 08/22/24  1443 08/22/24  1339 08/22/24  1238 08/22/24  1143 08/22/24  1137 08/22/24  1112 08/21/24  0744   SODIUM mmol/L 132* 132* 136  --   --   --   --   --  139 141   POTASSIUM mmol/L 4.1 4.3 4.0  --   --   --   --   --  4.4 4.1   CHLORIDE mmol/L 97* 98 104  --   --   --   --   --  107 102   CO2 mmol/L 26.4 23.9 21.1*  --   --   --   --   --  21.4* 26.6   BUN mg/dL 20 17 14  --   --   --   --   --  11 15   CREATININE mg/dL 0.94 0.96 0.91 0.86 0.86 0.84 0.76   < > 0.96 0.98   GLUCOSE mg/dL 122* 129* 162*  --   --   --   --   --  111* 132*   MAGNESIUM mg/dL  --   --  2.9*  --   --   --   --   --   --   --    PHOSPHORUS mg/dL  --   --  4.9*  --   --   --   --   --  1.4*  --     < > = values in this interval not displayed.     Radiology(recent) XR Chest 1 View    Result Date: 8/24/2024  Impression: Interval removal of left-sided mediastinal drain. Otherwise stable lines and tubes. Similar pulmonary vascular congestion with bibasal opacities and small bilateral pleural effusions accounting for differences in lung volume. Electronically Signed: Alvaro Dewey  8/24/2024 11:44 AM EDT  Workstation ID: [id number]    XR Chest 1 View    Result Date: 8/24/2024  Impression: 1. Removal of [place]-Alan catheter with stable right IJ vascular sheath. 2. Stable midline chest tube. No pneumothorax. 3. Cardiomegaly with increased central pulmonary vascular congestion. 4. Worsening bibasilar airspace opacities which may relate to atelectasis with small bilateral pleural effusions. Electronically Signed: Wil Rodriguez MD  8/24/2024 7:36 AM EDT  Workstation ID: [id number]     Assessment and plan    Aneurysm of ascending aorta    Nonrheumatic aortic valve insufficiency    Thoracic aortic aneurysm without rupture  Hypertension hyperlipidemia  Status post surgery for ascending aorta and aortic valve doing remarkably  well being managed by CT surgery and continue current management  Patient is clinically doing well  Hemodynamically stable  Continue current management      Electronically signed by Tex Arreola MD, 08/25/24, 10:21 AM EDT.

## 2024-08-25 NOTE — PROGRESS NOTES
POD #3 aortic valve repair and ascending aortic replace  Vitals are stable, sinus rhythm, EKG with changes suggesting pericarditis    Net fluid balance -125 cc  Pericardial YULIET 75 cc in 8 hours  Lungs are clear, cor is regular, incisions are clean  Remove wires and mediastinal drain later on, increase activity, pulmonary toilet, colchicine and Toradol for possible pericarditis, anticipate discharge soon

## 2024-08-26 LAB
ANION GAP SERPL CALCULATED.3IONS-SCNC: 8.6 MMOL/L (ref 5–15)
BUN SERPL-MCNC: 28 MG/DL (ref 6–20)
BUN/CREAT SERPL: 27.5 (ref 7–25)
CALCIUM SPEC-SCNC: 8.4 MG/DL (ref 8.6–10.5)
CHLORIDE SERPL-SCNC: 99 MMOL/L (ref 98–107)
CO2 SERPL-SCNC: 26.4 MMOL/L (ref 22–29)
CREAT SERPL-MCNC: 1.02 MG/DL (ref 0.76–1.27)
DEPRECATED RDW RBC AUTO: 43.1 FL (ref 37–54)
EGFRCR SERPLBLD CKD-EPI 2021: 85.2 ML/MIN/1.73
ERYTHROCYTE [DISTWIDTH] IN BLOOD BY AUTOMATED COUNT: 13.2 % (ref 12.3–15.4)
GLUCOSE BLDC GLUCOMTR-MCNC: 119 MG/DL (ref 70–105)
GLUCOSE BLDC GLUCOMTR-MCNC: 132 MG/DL (ref 70–105)
GLUCOSE BLDC GLUCOMTR-MCNC: 187 MG/DL (ref 70–105)
GLUCOSE BLDC GLUCOMTR-MCNC: 98 MG/DL (ref 70–105)
GLUCOSE SERPL-MCNC: 112 MG/DL (ref 65–99)
HCT VFR BLD AUTO: 32.1 % (ref 37.5–51)
HGB BLD-MCNC: 10.5 G/DL (ref 13–17.7)
MAGNESIUM SERPL-MCNC: 2.7 MG/DL (ref 1.6–2.6)
MCH RBC QN AUTO: 28.8 PG (ref 26.6–33)
MCHC RBC AUTO-ENTMCNC: 32.7 G/DL (ref 31.5–35.7)
MCV RBC AUTO: 88.2 FL (ref 79–97)
PLATELET # BLD AUTO: 171 10*3/MM3 (ref 140–450)
PMV BLD AUTO: 10 FL (ref 6–12)
POTASSIUM SERPL-SCNC: 3.4 MMOL/L (ref 3.5–5.2)
POTASSIUM SERPL-SCNC: 4 MMOL/L (ref 3.5–5.2)
QT INTERVAL: 347 MS
QT INTERVAL: 364 MS
QT INTERVAL: 392 MS
QTC INTERVAL: 418 MS
QTC INTERVAL: 420 MS
QTC INTERVAL: 422 MS
RBC # BLD AUTO: 3.64 10*6/MM3 (ref 4.14–5.8)
SODIUM SERPL-SCNC: 134 MMOL/L (ref 136–145)
WBC NRBC COR # BLD AUTO: 5.12 10*3/MM3 (ref 3.4–10.8)

## 2024-08-26 PROCEDURE — 93005 ELECTROCARDIOGRAM TRACING: CPT | Performed by: NURSE PRACTITIONER

## 2024-08-26 PROCEDURE — 83735 ASSAY OF MAGNESIUM: CPT | Performed by: NURSE PRACTITIONER

## 2024-08-26 PROCEDURE — 63710000001 INSULIN LISPRO (HUMAN) PER 5 UNITS: Performed by: THORACIC SURGERY (CARDIOTHORACIC VASCULAR SURGERY)

## 2024-08-26 PROCEDURE — 84132 ASSAY OF SERUM POTASSIUM: CPT | Performed by: THORACIC SURGERY (CARDIOTHORACIC VASCULAR SURGERY)

## 2024-08-26 PROCEDURE — 80048 BASIC METABOLIC PNL TOTAL CA: CPT | Performed by: NURSE PRACTITIONER

## 2024-08-26 PROCEDURE — 85027 COMPLETE CBC AUTOMATED: CPT | Performed by: NURSE PRACTITIONER

## 2024-08-26 PROCEDURE — 93010 ELECTROCARDIOGRAM REPORT: CPT | Performed by: INTERNAL MEDICINE

## 2024-08-26 PROCEDURE — 99232 SBSQ HOSP IP/OBS MODERATE 35: CPT | Performed by: INTERNAL MEDICINE

## 2024-08-26 PROCEDURE — 25010000002 ENOXAPARIN PER 10 MG: Performed by: NURSE PRACTITIONER

## 2024-08-26 PROCEDURE — 82948 REAGENT STRIP/BLOOD GLUCOSE: CPT

## 2024-08-26 PROCEDURE — 82948 REAGENT STRIP/BLOOD GLUCOSE: CPT | Performed by: THORACIC SURGERY (CARDIOTHORACIC VASCULAR SURGERY)

## 2024-08-26 RX ORDER — POTASSIUM CHLORIDE 1500 MG/1
20 TABLET, EXTENDED RELEASE ORAL DAILY
Status: DISCONTINUED | OUTPATIENT
Start: 2024-08-26 | End: 2024-08-26

## 2024-08-26 RX ORDER — POTASSIUM CHLORIDE 1500 MG/1
40 TABLET, EXTENDED RELEASE ORAL EVERY 4 HOURS
Status: COMPLETED | OUTPATIENT
Start: 2024-08-26 | End: 2024-08-26

## 2024-08-26 RX ORDER — POTASSIUM CHLORIDE 1500 MG/1
40 TABLET, EXTENDED RELEASE ORAL ONCE
Status: DISCONTINUED | OUTPATIENT
Start: 2024-08-26 | End: 2024-08-26

## 2024-08-26 RX ADMIN — ASPIRIN 81 MG: 81 TABLET, COATED ORAL at 08:34

## 2024-08-26 RX ADMIN — MUPIROCIN: 20 OINTMENT TOPICAL at 08:50

## 2024-08-26 RX ADMIN — FUROSEMIDE 20 MG: 20 TABLET ORAL at 08:34

## 2024-08-26 RX ADMIN — ENOXAPARIN SODIUM 40 MG: 100 INJECTION SUBCUTANEOUS at 16:24

## 2024-08-26 RX ADMIN — GABAPENTIN 100 MG: 100 CAPSULE ORAL at 08:34

## 2024-08-26 RX ADMIN — COLCHICINE 0.6 MG: 0.6 TABLET, FILM COATED ORAL at 08:34

## 2024-08-26 RX ADMIN — Medication 12.5 MG: at 20:17

## 2024-08-26 RX ADMIN — HYDROCODONE BITARTRATE AND ACETAMINOPHEN 1 TABLET: 5; 325 TABLET ORAL at 08:49

## 2024-08-26 RX ADMIN — SENNOSIDES AND DOCUSATE SODIUM 2 TABLET: 50; 8.6 TABLET ORAL at 08:34

## 2024-08-26 RX ADMIN — COLCHICINE 0.6 MG: 0.6 TABLET, FILM COATED ORAL at 20:16

## 2024-08-26 RX ADMIN — POTASSIUM CHLORIDE 40 MEQ: 1500 TABLET, EXTENDED RELEASE ORAL at 12:56

## 2024-08-26 RX ADMIN — CHLORHEXIDINE GLUCONATE, 0.12% ORAL RINSE 15 ML: 1.2 SOLUTION DENTAL at 20:16

## 2024-08-26 RX ADMIN — HYDROCODONE BITARTRATE AND ACETAMINOPHEN 1 TABLET: 5; 325 TABLET ORAL at 21:31

## 2024-08-26 RX ADMIN — TAMSULOSIN HYDROCHLORIDE 0.4 MG: 0.4 CAPSULE ORAL at 08:49

## 2024-08-26 RX ADMIN — POTASSIUM CHLORIDE 40 MEQ: 1500 TABLET, EXTENDED RELEASE ORAL at 08:33

## 2024-08-26 RX ADMIN — CHLORHEXIDINE GLUCONATE, 0.12% ORAL RINSE 15 ML: 1.2 SOLUTION DENTAL at 08:34

## 2024-08-26 RX ADMIN — ATORVASTATIN CALCIUM 40 MG: 40 TABLET, FILM COATED ORAL at 20:16

## 2024-08-26 RX ADMIN — PANTOPRAZOLE SODIUM 40 MG: 40 TABLET, DELAYED RELEASE ORAL at 06:07

## 2024-08-26 RX ADMIN — INSULIN LISPRO 2 UNITS: 100 INJECTION, SOLUTION INTRAVENOUS; SUBCUTANEOUS at 08:34

## 2024-08-26 RX ADMIN — HYDROCODONE BITARTRATE AND ACETAMINOPHEN 1 TABLET: 5; 325 TABLET ORAL at 14:57

## 2024-08-26 RX ADMIN — GABAPENTIN 100 MG: 100 CAPSULE ORAL at 20:16

## 2024-08-26 RX ADMIN — MUPIROCIN 1 APPLICATION: 20 OINTMENT TOPICAL at 20:16

## 2024-08-26 RX ADMIN — POLYETHYLENE GLYCOL 3350 17 G: 17 POWDER, FOR SOLUTION ORAL at 08:34

## 2024-08-26 NOTE — PAYOR COMM NOTE
"Sami Quiroga (59 y.o. Male)       Date of Birth   1965    Social Security Number       Address   [address] [place] [facility]    Home Phone   [phone]    MRN   [id number]       Rastafari   None    Marital Status                               Admission Date   8/22/24    Admission Type   Elective    Admitting Provider   Carlos Alex MD    Attending Provider   Carlos Alex MD    Department, Room/Bed   [facility] CARDIOVASCULAR CARE UNIT, 3114/1       Discharge Date       Discharge Disposition       Discharge Destination                                 Attending Provider: Carlos Alex MD    Allergies: No Known Allergies    Isolation: None   Infection: None   Code Status: CPR    Ht: 180.3 cm (70.98\")   Wt: 93 kg (205 lb)    Admission Cmt: None   Principal Problem: Aneurysm of ascending aorta [I71.21]                   Active Insurance as of 8/22/2024       Primary Coverage       Payor Plan Insurance Group Employer/Plan Group    ANTHEM BLUE CROSS ANTHEM BLUE CROSS BLUE SHIELD PPO [id number]       Payor Plan Address Payor Plan Phone Number Payor Plan Fax Number Effective Dates    [address] [phone]  1/1/2021 - None Entered    [place] [address]         Subscriber Name Subscriber Birth Date Member ID       SAMI QUIROGA 1965 [id number]                     Emergency Contacts        (Rel.) Home Phone Work Phone Mobile Phone    LENA QUIROGA \"DOTTY\" (Spouse) [phone] -- --                 Operative/Procedure Notes (last 7 days)        Carlos Alex MD at 08/22/24 0759          Operative Note    Date of Dictation: 08/24/24    Date of Procedure: 08/22/2020    Referring Physician: Carroll Lopez MD    Preoperative diagnosis:  1.  5 cm ascending aortic aneurysm with ascending to descending aorta index greater than 2  2.  Mild to moderate aortic insufficiency with an eccentric jet  3.  Bicuspid aortopathy    Postoperative " diagnosis:  Same    Procedure:   1.  Elective ascending aortic aneurysm repair with a 30 mm Gelweave Dacron interposition graft  2.  Aortic valve repair with plication of the conjoined leaflet, release of the raphae of the same leaflet and narrowing of the sinotubular junction symmetrically to 30 mm  3.  Left atrial appendage epicardial closure with a 35 mm atrial clip device    Surgeon: Carlos Alex MD     Assistants: Assistant: Jenae Balderrama PA was responsible for performing the following activities: Retraction, Suction, Irrigation, Suturing, Closing, Placing Dressing, and all aspects of the complex cardiac case  and their skilled assistance was necessary for the success of this case.    Anesthesia: General endotracheal anesthesia and CHACE    Findings:  Dysmorphic bicuspid aortic valve with cleft between the right and left conjoined leaflet and mild dilatation of the aortic root.  Tapering of the aneurysm    Estimated Blood Loss: Approximately 400 cc, most of it recovered with a Cell Saver device and cardiotomy suckers and was retransfused to the patient    STS Data:  The patient was explained the risks and benefits and alternatives of surgery and agreed to proceed.  The STS risk was calculated for aortic valve repair or replacement.  Antibiotics and beta-blockers were given within the STS required window.  Counseling was given about diet, alcohol and tobacco use as needed        Description of the procedure:     The patient was placed supine on the operative table. Anesthesia was given and lines placed. The patient was prepped and draped using the usual sterile technique. A median sternotomy was performed with a scalpel and the layers carried down to the sternum using the electrocautery. The sternum was split in the midline using a vertical oscillating saw. Hemostasis was achieved.  A  favaloro retractor was placed and anterior mediastinal was exposed the pericardium was opened and the edges were  tacked to the wound.  Of note, upon opening the sternum the patient became bradycardic requiring quick open of the pericardium and external pacing.  I explored the distal ascending aorta and there was narrowing and tapering up to 1 cm below the takeoff of the innominate artery therefore circulatory arrest was not needed to complete the distal anastomosis.  300 units of IV heparin was given to maintain the ACT over 400.  Cannulation sutures were placed in the mid aortic arch and right atrium. Cardiopulmonary bypass was started and the plaque cannulas were placed.  I placed the ascending aortic cross-clamp incorporating part of the innominate artery to allow a more distal anastomosis..  One liter of cold blood cardioplegia was given in an antegrade fashion to achieve diastolic arrest and further doses every 15 minutes thereafter also using retrograde infusion and coronary ostia cannula in the right.  The heart was retracted and expose the base of the left atrial appendage.  I measured to 35 mm and I selected atrial clip device 35 mm which was released without difficulty with nice closure of the appendage.  Following, the aorta was transected in the mid portion and the root was examined.  There was dilatation of the root to a diameter present 3.8 cm.  In my opinion it did not need to be replaced.  The aortic valve was bicuspid with mild fibrosis in the clefted conjoin leaflet.  Using tenotomy scissors I release the fibrotic raphae to allow the leaflet to accommodate better and then I used 5-0 Prolene suture with small needles to close the cleft in the leaflet.  I measured the length of both free edges of the leaflets and the both match and then I perform a water test that was perfectly continent.  I selected a 30 mm dacryon graft and marked it symmetrically in 3 corners to match symmetrically the sinotubular junction which was reduced to 30 mm.  I used a 4-0 Prolene continuous suture and a thin strip of Eliazar felt and  a complete anastomosis intussuscepting the graft inside the root.  I used a nerve hook to tensed the suture line and then tied down and placed a few repair sutures posteriorly to prevent leakage.  I resected the ascending aorta to 1 cm below the distal clamp and completed anastomosis between the graft which was trimmed to conform the normal curvatures of the aorta and to the distal aortic cuff.  I used a 4-0 Prolene continuous suture and complete anastomosis without difficulty.  After a few repair sutures I placed an antegrade cardioplegia cannula and vent in the mid ascending aortic graft, I gave the warm dose of cardioplegia and then I removed the aortic clamp with steep suction in the graft vent.  I allowed time for rewarming.  The left pleural space was suctioned and the lungs ventilated. The heart was paced till regular atrial rhythm resumed. I allowed the heart to eject  and I de-aired the left heart chambers under CHACE guidance and once hemodynamics were acceptable, then the CPB was discontinued and the venous and cardioplegia cannulas removed. The matching dose of protamine was given and the aortic cannula removed as well. AV temporary wires and pleural and mediastinal chest tubes were placed and the wound sprayed with platelet rich plasma.  The perioperative CHACE showed the without leaflet prolapse and without aortic insufficiency or whatsoever.  The preoperative echocardiogram showed mild close to moderate AI and a strong mild with low pressure in the operating room. The sternum was closed with single and double wires and soft tissue in layers of reabsorbable material. The wounds were covered with sterile dressings.    Specimen removed: Proximal thoracic aorta tissue    CPB time and Aortic clamp times: Documented in the perfusion record       Complications:  none           Disposition: Cardiovascular recovery room           Condition: Critical but stable.    Carlos Alex M.D.      Electronically signed  by Carlos Alex MD at 24 1744          Physician Progress Notes (last 48 hours)        Suzie Felix APRN at 24 1034          [facility] CARDIOLOGY  [facility]        LOS:  LOS: 4 days   Patient Name: Sami Quiroga  Age/Sex: 59 y.o. male  : 1965  MRN: [id number]    Day of Service: 24   Length of Stay: 4  Encounter Provider: STEPHAN Merida  Place of Service: [facility] CARDIOLOGY  Patient Care Team:  Renard Mcintosh MD as PCP - General    Subjective:     Chief Complaint:  F/U VHD    Subjective:   Patient denies SOA.  He has ambulated in the hallway without difficulty.    Current Medications:   Scheduled Meds:aspirin, 81 mg, Oral, Daily  atorvastatin, 40 mg, Oral, Nightly  chlorhexidine, 15 mL, Mouth/Throat, Q12H  colchicine, 0.6 mg, Oral, Q12H  enoxaparin, 40 mg, Subcutaneous, Daily  gabapentin, 100 mg, Oral, BID  insulin lispro, 2-7 Units, Subcutaneous, 4x Daily AC & at Bedtime  metoprolol tartrate, 12.5 mg, Oral, Q12H  mupirocin, , Each Nare, BID  pantoprazole, 40 mg, Oral, Q AM  potassium chloride ER, 40 mEq, Oral, Q4H  senna-docusate sodium, 2 tablet, Oral, BID  tamsulosin, 0.4 mg, Oral, Daily      Continuous Infusions:     Allergies:  No Known Allergies    ROS    Objective:     Temp:  [98.1 °F (36.7 °C)-98.7 °F (37.1 °C)] 98.7 °F (37.1 °C)  Heart Rate:  [86-97] 94  Resp:  [16] 16  BP: ()/(53-65) 105/61     Intake/Output Summary (Last 24 hours) at 2024 1034  Last data filed at 2024 0830  Gross per 24 hour   Intake 480 ml   Output 30 ml   Net 450 ml     Body mass index is 28.6 kg/m².      24  0600 24  0539 24  0600   Weight: 94.3 kg (207 lb 12.8 oz) 93.8 kg (206 lb 14.4 oz) 93 kg (205 lb)         Physical Exam:  Neuro:  CV:  Resp:  GI:  Ext:  Tele: AAOx3, no gross deficits  S1S2 RRR, no murmur  Nonlabored, CTA  BS+, abd soft  Pedal pulses palp, no edema  SR                                                    Lab Review:   Results from last 7 days   Lab Units 08/26/24  0607 08/25/24  0432 08/22/24  1112 08/21/24  0744   SODIUM mmol/L 134* 132*   < > 141   POTASSIUM mmol/L 3.4* 4.1   < > 4.1   CHLORIDE mmol/L 99 97*   < > 102   CO2 mmol/L 26.4 26.4   < > 26.6   BUN mg/dL 28* 20   < > 15   CREATININE mg/dL 1.02 0.94   < > 0.98   GLUCOSE mg/dL 112* 122*   < > 132*   CALCIUM mg/dL 8.4* 8.6   < > 9.5   AST (SGOT) U/L  --   --   --  32   ALT (SGPT) U/L  --   --   --  42*    < > = values in this interval not displayed.     Results from last 7 days   Lab Units 08/23/24  0910   HSTROP T ng/L 215*     Results from last 7 days   Lab Units 08/26/24  0607 08/25/24  0432   WBC 10*3/mm3 5.12 11.25*   HEMOGLOBIN g/dL 10.5* 11.5*   HEMATOCRIT % 32.1* 35.1*   PLATELETS 10*3/mm3 171 144     Results from last 7 days   Lab Units 08/23/24  0306 08/22/24  1112 08/22/24  0942   INR  1.05 1.11* 1.34*   APTT seconds  --  22.9* 21.1*     Results from last 7 days   Lab Units 08/26/24  0607 08/23/24  0306   MAGNESIUM mg/dL 2.7* 2.9*     Results from last 7 days   Lab Units 08/21/24  0744   CHOLESTEROL mg/dL 130   TRIGLYCERIDES mg/dL 154*   HDL CHOL mg/dL 36*               Recent Radiology:  Imaging Results (Most Recent)       Procedure Component Value Units Date/Time    XR Chest 1 View [[id number]] Collected: 08/25/24 1705     Updated: 08/25/24 1708    Narrative:      XR CHEST 1 VW    Date of Exam: 8/25/2024 3:58 PM EDT    Indication: s/p right mediastinal ct removal    Comparison: 8/24/2024 1056 hours    Findings:  The mediastinal chest tube has been removed. There is improved aeration throughout the lungs bilaterally. There is residual atelectasis in the lung bases. Small bilateral pleural effusions are seen. The cardiac silhouette and mediastinum are stable.   Postoperative changes are noted. The right IJ venous sheath has been removed.      Impression:      Impression:  1.Interval removal of the  mediastinal chest tube and right IJ venous sheath.  2.Improved aeration throughout the lungs bilaterally.  3.Residual atelectasis is noted in the lung bases. Small bilateral pleural effusions are noted.      Electronically Signed: Osmel Heredia MD    8/25/2024 5:06 PM EDT    Workstation ID: [id number]    XR Chest 1 View [[id number]] Collected: 08/24/24 1139     Updated: 08/24/24 1146    Narrative:      XR CHEST 1 VW    Date of Exam: 8/24/2024 11:08 AM EDT    Indication: s/p ct removal    Comparison: Chest radiograph 8/24/2024    Findings:    Stable appearing right IJ sheath. Left-sided mediastinal drain has been removed. Right-sided mediastinal drain appears in stable position.    Mediastinum: Cardiomediastinal silhouette appears unchanged including postoperative changes and accentuation secondary to low lung volumes.    Lungs: Low lung volumes with hypoventilatory change. Similar pulmonary vascular congestion and bibasal opacities accounting for differences in lung volume.    Pleura: Small bilateral pleural effusions. No visible pneumothorax.    Bones and soft tissues: No acute osseous abnormality.        Impression:      Impression:  Interval removal of left-sided mediastinal drain. Otherwise stable lines and tubes.    Similar pulmonary vascular congestion with bibasal opacities and small bilateral pleural effusions accounting for differences in lung volume.      Electronically Signed: Alvaro Dewey    8/24/2024 11:44 AM EDT    Workstation ID: [id number]    XR Chest 1 View [[id number]] Collected: 08/24/24 0735     Updated: 08/24/24 0738    Narrative:      XR CHEST 1 VW    Date of Exam: 8/24/2024 5:20 AM EDT    Indication: Post-Op Heart Surgery    Comparison: 8/23/2024    Findings:  [place]-Alan catheter is been removed. Stable right IJ vascular sheath. Postsurgical changes of sternotomy and CABG again noted. Left atrial appendage clip. Stable cardiomegaly. Increased central pulmonary vascular congestion. Negative for  pneumothorax.   Worsening bibasilar airspace opacities with small bilateral pleural effusions. Negative for pneumothorax. Stable midline chest tube.      Impression:      Impression:  1. Removal of [place]-Alan catheter with stable right IJ vascular sheath.  2. Stable midline chest tube. No pneumothorax.  3. Cardiomegaly with increased central pulmonary vascular congestion.  4. Worsening bibasilar airspace opacities which may relate to atelectasis with small bilateral pleural effusions.        Electronically Signed: Wil Rodriguez MD    8/24/2024 7:36 AM EDT    Workstation ID: [id number]    XR Chest 1 View [[id number]] Collected: 08/23/24 0713     Updated: 08/23/24 0716    Narrative:      XR CHEST 1 VW    Date of Exam: 8/23/2024 5:55 AM EDT    Indication: Post-Op Heart Surgery    Comparison: 8/22/2024    Findings:  Interval extubation and removal of esophagogastric tube. Right IJ [place]-Alan catheter stable in expected position. Postsurgical changes of sternotomy and CABG. Left atrial appendage clip. Lungs are hypoinflated with bibasilar airspace opacities left   greater than right likely atelectasis. No significant effusion. Negative for pneumothorax. Stable chest tubes overlying the midline.      Impression:      Impression:  1. Interval extubation and removal of esophagogastric tube.  2. Stable right IJ [place]-Alan catheter.  3. Hypoinflated lungs with bibasilar airspace disease left greater than right likely atelectasis.  4. No pneumothorax.        Electronically Signed: Wil Rodriguez MD    8/23/2024 7:14 AM EDT    Workstation ID: [id number]    XR Chest 1 View [[id number]] Collected: 08/22/24 1140     Updated: 08/22/24 1144    Narrative:      XR CHEST 1 VW    Date of Exam: 8/22/2024 11:39 AM EDT    Indication: Post-Op Check Line & Tube Placement    Comparison: August 21, 2024    Findings:  ET tube tip is at level of the clavicles. There is a [place]-Alan catheter with tip in the pulmonary outflow tract. There is a nasogastric tube  noted with its tip at least within the stomach. There is left atrial appendage clip. There is some atelectasis or   scarring in the right lower chest. There is slight prominence of markings in the left perihilar region which could reflect shadows from pulmonary vessels accentuated secondary to technique and depth of inspiration. Some atelectasis in this area not   excluded. There is no definite pneumothorax.      Impression:      Impression:  1.Lines and tubes as noted.  2.Atelectatic change right lower chest. There is some prominence of markings left infrahilar area that might reflect some atelectasis in this area.      Electronically Signed: Rodrigo Bhatia MD    8/22/2024 11:42 AM EDT    Workstation ID: [id number]            ECHOCARDIOGRAM:    Results for orders placed in visit on 08/22/24    Intra-Op Anesthesia CHACE    Narrative  Intra-Op Anesthesia CHACE    Procedure Performed: Intra-Op Anesthesia CHACE  Start Time:  End Time:    Preanesthesia Checklist:  Patient identified, IV assessed, risks and benefits discussed, monitors and equipment assessed, procedure being performed at surgeon's request and anesthesia consent obtained.    General Procedure Information  Diagnostic Indications for Echo:  assessment of surgical repair and hemodynamic monitoring  Location performed:  OR  Intubated  Bite block placed  Heart visualized  Probe Insertion:  Easy  Probe Type:  Biplane and multiplane  Modalities:  Color flow mapping, pulse wave Doppler and continuous wave Doppler    Echocardiographic and Doppler Measurements    Ventricles    Right Ventricle:  Cavity size normal.  Hypertrophy not present.  Thrombus not present.  Global function normal.  Ejection Fraction 60%.  Left Ventricle:  Cavity size normal.  Thrombus not present.  Global Function normal.  Ejection Fraction 60%.    Ventricular Regional Function:  1- Basal Anteroseptal:  normal  2- Basal Anterior:  normal  3- Basal Anterolateral:  normal  4- Basal Inferolateral:   normal  5- Basal Inferior:  normal  6- Basal Inferoseptal:  normal  7- Mid Anteroseptal:  normal  8- Mid Anterior:  normal  9- Mid Anterolateral:  normal  10- Mid Inferolateral:  normal  11- Mid Inferior:  normal  12- Mid Inferoseptal:  normal  13- Apical Anterior:  normal  14- Apical Lateral:  normal  15- Apical Inferior:  normal  16- Apical Septal:  normal  17- [place]:  normal      Valves    Aortic Valve:  Annulus normal.  Stenosis mild.  Regurgitation absent.  Leaflets bicuspid.  Leaflet motions restricted.    Mitral Valve:  Annulus normal.  Stenosis not present.  Regurgitation absent.  Leaflets normal.  Leaflet motions normal.    Tricuspid Valve:  Annulus normal.  Stenosis not present.  Regurgitation absent.  Leaflets normal.  Leaflet motions normal.  Pulmonic Valve:  Annulus normal.  Stenosis not present.  Regurgitation absent.      Aorta    Ascending Aorta:  Size normal.  Dissection not present.  Plaque thickness less than 3 mm.  Mobile plaque not present.  Aortic Arch:  Size normal.  Dissection not present.  Plaque thickness less than 3 mm.  Mobile plaque not present.  Descending Aorta:  Size normal.  Dissection not present.  Plaque thickness less than 3 mm.  Mobile plaque not present.      Atria    Right Atrium:  Size normal.  Left Atrium:  Size dilated.  Spontaneous echo contrast not present.  Thrombus not present.  Tumor not present.  Device not present.  Left atrial appendage normal.      Septa    Atrial Septum:  Intra-atrial septal morphology contains patent foramen ovale.  Patent foramen ovale shunts left to right.    Ventricular Septum:  Intra-ventricular septum morphology normal.        Other Findings  Pericardium:  normal  Pleural Effusion:  none  Pulmonary Arteries:  normal      Anesthesia Information  Performed Personally  Anesthesiologist:  Jethro Swan MD      Echocardiogram Comments:  CHACE placed without resistance, lubricated, bite guard      Pre cpb CHACE  LV no WMA EF 60  RV nl SF  MV wnl  TV  wnl  RA dilated  AV bicuspid mild eccentric AI (toward AML) . Dilated aortic root 3.9 CM STJ 4cm asc aorta 4.4 cm taper to nl in distal arch      Post cpb AV repair, asc aorta replace  LV RV no change  MV TV no change  AV bicuspid repair, no AI        I reviewed the patient's new clinical results.    EKG:      Assessment:       Aneurysm of ascending aorta    Nonrheumatic aortic valve insufficiency    Thoracic aortic aneurysm without rupture    1) Ascending Aortic Aneurysm with Bicuspid AV s/p aneurysm repair and AV repair with JUSTIN closure 8/22  - preop cath 6/2024 showed normal coronaries  - periop CHACE showed EF = 60%    2) HTN    3) HLD     4) hypokalemia  - K 3.4, Mg 2.7  - repleted    5) postop pericarditis  - on colchicine and toradol    Plan:   Tele shows SR.  Repeat EKG to follow-up on post-op pericarditis.  Hopefully home soon.  F/U with Dr. Lopez in 2 weeks on discharge.          Electronically signed by STEPHAN Merida, 08/26/24, 10:45 AM EDT.     Electronically signed by Suzie Felix APRN at 08/26/24 1045       Alex Bailon PA-C at 08/26/24 0953           LOS: 4 days   Patient Care Team:  Renard Mcintosh MD as PCP - General    Chief Complaint:   Post-op follow-up, s/p ascending aneurysm repair, AV repair    Subjective  Sitting up in chair. Pain controlled on current regimen. No new complaints. Discussed possibly going home today or tomorrow.     Vital Signs  Temp:  [98.1 °F (36.7 °C)-98.7 °F (37.1 °C)] 98.7 °F (37.1 °C)  Heart Rate:  [86-97] 94  Resp:  [16] 16  BP: ()/(42-65) 105/61      08/24/24  0600 08/25/24  0539 08/26/24  0600   Weight: 94.3 kg (207 lb 12.8 oz) 93.8 kg (206 lb 14.4 oz) 93 kg (205 lb)     Body mass index is 28.6 kg/m².    Intake/Output Summary (Last 24 hours) at 8/26/2024 0953  Last data filed at 8/26/2024 0830  Gross per 24 hour   Intake 480 ml   Output 30 ml   Net 450 ml     I/O this shift:  In: 240 [P.O.:240]  Out: -       Objective:  Vital  "signs: (most recent): Blood pressure 105/61, pulse 94, temperature 98.7 °F (37.1 °C), temperature source Oral, resp. rate 16, height 180.3 cm (70.98\"), weight 93 kg (205 lb), SpO2 95%.                Physical Exam:   General Appearance: awake and alert, no acute distress   Lungs: clear to auscultation, respirations regular, and respirations unlabored   Heart: regular rhythm & normal rate and normal S1, S2   Abdomen: soft or nontender, + bowel sounds, +BM   Skin: sternal incision clean, dry, intact   Neuro: alert and oriented, no focal deficits.     Results Review:      WBC WBC   Date Value Ref Range Status   08/26/2024 5.12 3.40 - 10.80 10*3/mm3 Final   08/25/2024 11.25 (H) 3.40 - 10.80 10*3/mm3 Final   08/24/2024 15.76 (H) 3.40 - 10.80 10*3/mm3 Final      HGB Hemoglobin   Date Value Ref Range Status   08/26/2024 10.5 (L) 13.0 - 17.7 g/dL Final   08/25/2024 11.5 (L) 13.0 - 17.7 g/dL Final   08/24/2024 11.7 (L) 13.0 - 17.7 g/dL Final      HCT Hematocrit   Date Value Ref Range Status   08/26/2024 32.1 (L) 37.5 - 51.0 % Final   08/25/2024 35.1 (L) 37.5 - 51.0 % Final   08/24/2024 37.0 (L) 37.5 - 51.0 % Final      Platelets Platelets   Date Value Ref Range Status   08/26/2024 171 140 - 450 10*3/mm3 Final   08/25/2024 144 140 - 450 10*3/mm3 Final   08/24/2024 149 140 - 450 10*3/mm3 Final        PT/INR:  No results found for: \"PROTIME\"/No results found for: \"INR\"    Sodium Sodium   Date Value Ref Range Status   08/26/2024 134 (L) 136 - 145 mmol/L Final   08/25/2024 132 (L) 136 - 145 mmol/L Final   08/24/2024 132 (L) 136 - 145 mmol/L Final      Potassium Potassium   Date Value Ref Range Status   08/26/2024 3.4 (L) 3.5 - 5.2 mmol/L Final   08/25/2024 4.1 3.5 - 5.2 mmol/L Final   08/24/2024 4.3 3.5 - 5.2 mmol/L Final      Chloride Chloride   Date Value Ref Range Status   08/26/2024 99 98 - 107 mmol/L Final   08/25/2024 97 (L) 98 - 107 mmol/L Final   08/24/2024 98 98 - 107 mmol/L Final      Bicarbonate CO2   Date Value Ref " Range Status   08/26/2024 26.4 22.0 - 29.0 mmol/L Final   08/25/2024 26.4 22.0 - 29.0 mmol/L Final   08/24/2024 23.9 22.0 - 29.0 mmol/L Final      BUN BUN   Date Value Ref Range Status   08/26/2024 28 (H) 6 - 20 mg/dL Final   08/25/2024 20 6 - 20 mg/dL Final   08/24/2024 17 6 - 20 mg/dL Final      Creatinine Creatinine   Date Value Ref Range Status   08/26/2024 1.02 0.76 - 1.27 mg/dL Final   08/25/2024 0.94 0.76 - 1.27 mg/dL Final   08/24/2024 0.96 0.76 - 1.27 mg/dL Final      Calcium Calcium   Date Value Ref Range Status   08/26/2024 8.4 (L) 8.6 - 10.5 mg/dL Final   08/25/2024 8.6 8.6 - 10.5 mg/dL Final   08/24/2024 8.6 8.6 - 10.5 mg/dL Final      Magnesium Magnesium   Date Value Ref Range Status   08/26/2024 2.7 (H) 1.6 - 2.6 mg/dL Final        aspirin, 81 mg, Oral, Daily  atorvastatin, 40 mg, Oral, Nightly  chlorhexidine, 15 mL, Mouth/Throat, Q12H  colchicine, 0.6 mg, Oral, Q12H  enoxaparin, 40 mg, Subcutaneous, Daily  furosemide, 20 mg, Oral, Daily  gabapentin, 100 mg, Oral, BID  insulin lispro, 2-7 Units, Subcutaneous, 4x Daily AC & at Bedtime  metoprolol tartrate, 12.5 mg, Oral, Q12H  mupirocin, , Each Nare, BID  pantoprazole, 40 mg, Oral, Q AM  polyethylene glycol, 17 g, Oral, BID  potassium chloride ER, 40 mEq, Oral, Q4H  senna-docusate sodium, 2 tablet, Oral, BID  tamsulosin, 0.4 mg, Oral, Daily             Aneurysm of ascending aorta    Nonrheumatic aortic valve insufficiency    Thoracic aortic aneurysm without rupture      Assessment & Plan    - Bicuspid aortopathy/ ascending aortic aneurysm, 5 cm/ mild to mod aortic insufficiency, EF 60-65% (cath)--s/p ascending aortic aneurysm repair/ aortic valve repair/ JUSTIN clipping (Pagni),   - HTN  - HLD--statin  - Postop leukocytosis, reactive--resolved  - Postop ABLA, expected--watch closely, stable  - Postop ST elevation c/w pericarditis-- colchicine  - BPH-- home flomax     POD# 4.      Doing well. Continue routine care.  BP borderline, hold diuretics.  Replete  K.  Colchicine and toradol for pericarditis.   Mobilize and encourage pulm toilet.  Possibly home later today or tomorrow.       Alex Bailon PA-C  08/26/24  09:53 EDT      Electronically signed by Alex Bailon PA-C at 08/26/24 1003       Tex Arreola MD at 08/25/24 1020          CC--- post ascending aortic and aortic valve surgery postop day 1    Sub  Patient clinically doing well being managed by CT surgery and denies any symptoms except for incisional discomfort  Chest tube to be removed today      Past Medical History:   Diagnosis Date    Aortic insufficiency due to bicuspid aortic valve     Ascending aortic aneurysm     Bicuspid aortic valve     Heart murmur     Hyperlipidemia     Hypertension      Past Surgical History:   Procedure Laterality Date    APPENDECTOMY      CARDIAC CATHETERIZATION Right 6/17/2024    Procedure: Left Heart Cath, RADIAL;  Surgeon: Brittanie Lopez MD;  Location: [facility] CATH INVASIVE LOCATION;  Service: Cardiovascular;  Laterality: Right;  cath in June of 2024       Physical Exam    General:      well developed, well nourished, in no acute distress.    Head:      normocephalic and atraumatic.    Eyes:      PERRL/EOM intact, conjunctivae and sclerae clear without nystagmus.    Neck:      no  thyromegaly, trachea central with normal respiratory effort  Lungs:      Reduced  breath sounds in bases  Heart:       regular rate and rhythm, S1, S2 without murmurs, rubs, or gallops  Skin:      intact without lesions or rashes.    Psych:      alert and cooperative; normal mood and affect; normal attention span and concentration.      Physical exam unchanged         CBC    Results from last 7 days   Lab Units 08/25/24  0432 08/24/24  0422 08/23/24  0306 08/22/24  1707 08/22/24  1658 08/22/24  1525 08/22/24  1443 08/22/24  1137 08/22/24  1112 08/22/24  0947 08/22/24  0942 08/22/24  0905 08/22/24  0856   WBC 10*3/mm3 11.25* 15.76* 14.40*  --  14.62*  --   --   --  14.14*  --  12.20*   --  12.26*   HEMOGLOBIN g/dL 11.5* 11.7* 11.4*  --  11.7*  --   --   --  12.1*  --  11.1*  --  11.1*   HEMOGLOBIN, POC g/dL  --   --   --  11.0*  --  11.9* 11.5*   < >  --    < >  --    < >  --    PLATELETS 10*3/mm3 144 149 143  --  150  --   --   --  127*  --  107*  --  194    < > = values in this interval not displayed.     BMP   Results from last 7 days   Lab Units 08/25/24  0432 08/24/24  0422 08/23/24  0306 08/22/24  1443 08/22/24  1339 08/22/24  1238 08/22/24  1143 08/22/24  1137 08/22/24  1112 08/21/24  0744   SODIUM mmol/L 132* 132* 136  --   --   --   --   --  139 141   POTASSIUM mmol/L 4.1 4.3 4.0  --   --   --   --   --  4.4 4.1   CHLORIDE mmol/L 97* 98 104  --   --   --   --   --  107 102   CO2 mmol/L 26.4 23.9 21.1*  --   --   --   --   --  21.4* 26.6   BUN mg/dL 20 17 14  --   --   --   --   --  11 15   CREATININE mg/dL 0.94 0.96 0.91 0.86 0.86 0.84 0.76   < > 0.96 0.98   GLUCOSE mg/dL 122* 129* 162*  --   --   --   --   --  111* 132*   MAGNESIUM mg/dL  --   --  2.9*  --   --   --   --   --   --   --    PHOSPHORUS mg/dL  --   --  4.9*  --   --   --   --   --  1.4*  --     < > = values in this interval not displayed.     Radiology(recent) XR Chest 1 View    Result Date: 8/24/2024  Impression: Interval removal of left-sided mediastinal drain. Otherwise stable lines and tubes. Similar pulmonary vascular congestion with bibasal opacities and small bilateral pleural effusions accounting for differences in lung volume. Electronically Signed: Alvaro Dewey  8/24/2024 11:44 AM EDT  Workstation ID: [id number]    XR Chest 1 View    Result Date: 8/24/2024  Impression: 1. Removal of [place]-Alan catheter with stable right IJ vascular sheath. 2. Stable midline chest tube. No pneumothorax. 3. Cardiomegaly with increased central pulmonary vascular congestion. 4. Worsening bibasilar airspace opacities which may relate to atelectasis with small bilateral pleural effusions. Electronically Signed: Wil Rodriguez MD  8/24/2024  7:36 AM EDT  Workstation ID: [id number]     Assessment and plan    Aneurysm of ascending aorta    Nonrheumatic aortic valve insufficiency    Thoracic aortic aneurysm without rupture  Hypertension hyperlipidemia  Status post surgery for ascending aorta and aortic valve doing remarkably well being managed by CT surgery and continue current management  Patient is clinically doing well  Hemodynamically stable  Continue current management      Electronically signed by Tex PADRON. MD Maxwell, 08/25/24, 10:21 AM EDT.           Electronically signed by Tex Arreola MD at 08/25/24 1021       Carlos Alex MD at 08/25/24 0814          POD #3 aortic valve repair and ascending aortic replace  Vitals are stable, sinus rhythm, EKG with changes suggesting pericarditis    Net fluid balance -125 cc  Pericardial YULIET 75 cc in 8 hours  Lungs are clear, cor is regular, incisions are clean  Remove wires and mediastinal drain later on, increase activity, pulmonary toilet, colchicine and Toradol for possible pericarditis, anticipate discharge soon      Electronically signed by Carlos Alex MD at 08/25/24 0816       Tex Arreola MD at 08/24/24 1409          CC--- post ascending aortic and aortic valve surgery postop day 1    Sub  Patient clinically doing well being managed by CT surgery and denies any symptoms except for incisional discomfort      Past Medical History:   Diagnosis Date    Aortic insufficiency due to bicuspid aortic valve     Ascending aortic aneurysm     Bicuspid aortic valve     Heart murmur     Hyperlipidemia     Hypertension      Past Surgical History:   Procedure Laterality Date    APPENDECTOMY      CARDIAC CATHETERIZATION Right 6/17/2024    Procedure: Left Heart Cath, RADIAL;  Surgeon: Brittanie Lopez MD;  Location: [facility] CATH INVASIVE LOCATION;  Service: Cardiovascular;  Laterality: Right;  cath in June of 2024       Physical Exam    General:      well developed, well nourished, in no  acute distress.    Head:      normocephalic and atraumatic.    Eyes:      PERRL/EOM intact, conjunctivae and sclerae clear without nystagmus.    Neck:      no  thyromegaly, trachea central with normal respiratory effort  Lungs:      Reduced  breath sounds in bases  Heart:       regular rate and rhythm, S1, S2 without murmurs, rubs, or gallops  Skin:      intact without lesions or rashes.    Psych:      alert and cooperative; normal mood and affect; normal attention span and concentration.              CBC    Results from last 7 days   Lab Units 08/24/24  0422 08/23/24  0306 08/22/24  1707 08/22/24  1658 08/22/24  1525 08/22/24  1443 08/22/24  1339 08/22/24  1137 08/22/24  1112 08/22/24  0947 08/22/24  0942 08/22/24  0905 08/22/24  0856 08/22/24  0740 08/21/24  0744   WBC 10*3/mm3 15.76* 14.40*  --  14.62*  --   --   --   --  14.14*  --  12.20*  --  12.26*  --  6.62   HEMOGLOBIN g/dL 11.7* 11.4*  --  11.7*  --   --   --   --  12.1*  --  11.1*  --  11.1*  --  14.7   HEMOGLOBIN, POC g/dL  --   --  11.0*  --  11.9* 11.5* 12.4   < >  --    < >  --    < >  --    < >  --    PLATELETS 10*3/mm3 149 143  --  150  --   --   --   --  127*  --  107*  --  194  --  184    < > = values in this interval not displayed.     BMP   Results from last 7 days   Lab Units 08/24/24  0422 08/23/24  0306 08/22/24  1443 08/22/24  1339 08/22/24  1238 08/22/24  1143 08/22/24  1137 08/22/24  1112 08/21/24  0744   SODIUM mmol/L 132* 136  --   --   --   --   --  139 141   POTASSIUM mmol/L 4.3 4.0  --   --   --   --   --  4.4 4.1   CHLORIDE mmol/L 98 104  --   --   --   --   --  107 102   CO2 mmol/L 23.9 21.1*  --   --   --   --   --  21.4* 26.6   BUN mg/dL 17 14  --   --   --   --   --  11 15   CREATININE mg/dL 0.96 0.91 0.86 0.86 0.84 0.76 0.91 0.96 0.98   GLUCOSE mg/dL 129* 162*  --   --   --   --   --  111* 132*   MAGNESIUM mg/dL  --  2.9*  --   --   --   --   --   --   --    PHOSPHORUS mg/dL  --  4.9*  --   --   --   --   --  1.4*  --       Radiology(recent) XR Chest 1 View    Result Date: 8/24/2024  Impression: Interval removal of left-sided mediastinal drain. Otherwise stable lines and tubes. Similar pulmonary vascular congestion with bibasal opacities and small bilateral pleural effusions accounting for differences in lung volume. Electronically Signed: Alvaro Ferraroavril  8/24/2024 11:44 AM EDT  Workstation ID: [id number]    XR Chest 1 View    Result Date: 8/24/2024  Impression: 1. Removal of [place]-Alan catheter with stable right IJ vascular sheath. 2. Stable midline chest tube. No pneumothorax. 3. Cardiomegaly with increased central pulmonary vascular congestion. 4. Worsening bibasilar airspace opacities which may relate to atelectasis with small bilateral pleural effusions. Electronically Signed: Wil Rodriguez MD  8/24/2024 7:36 AM EDT  Workstation ID: [id number]    XR Chest 1 View    Result Date: 8/23/2024  Impression: 1. Interval extubation and removal of esophagogastric tube. 2. Stable right IJ [place]-Alan catheter. 3. Hypoinflated lungs with bibasilar airspace disease left greater than right likely atelectasis. 4. No pneumothorax. Electronically Signed: Wil Rodriguez MD  8/23/2024 7:14 AM EDT  Workstation ID: [id number]     Assessment and plan    Aneurysm of ascending aorta    Nonrheumatic aortic valve insufficiency    Thoracic aortic aneurysm without rupture  Hypertension hyperlipidemia    Status post surgery for ascending aorta and aortic valve doing remarkably well being managed by CT surgery and continue current management      Electronically signed by Tex Arreola MD, 08/24/24, 2:11 PM EDT.         Electronically signed by Tex Arreola MD at 08/24/24 1411       Consult Notes (last 72 hours)  Notes from 08/23/24 1130 through 08/26/24 1130   No notes of this type exist for this encounter.

## 2024-08-26 NOTE — CASE MANAGEMENT/SOCIAL WORK
Continued Stay Note   Alejandro     Patient Name: Sami Quiroga  MRN: [id number]  Today's Date: 8/26/2024    Admit Date: 8/22/2024    Plan: DC Plan: Anticipate routine home with family assist. Aortic Arch Repair 8/22/2024.   Discharge Plan       Row Name 08/26/24 1440       Plan    Plan DC Plan: Anticipate routine home with family assist. Aortic Arch Repair 8/22/2024.    Patient/Family in Agreement with Plan yes    Provided Post Acute Provider List? N/A    Provided Post Acute Provider Quality & Resource List? N/A    N/A Quality & Resource List Comment CM spoke with patient’s nurse and [facility] PA Alex Bailon to obtain clinical updates. PT/OT recommending home with family assist. PA anticipates DC home possible tomorrow 8/27/2024.CM will continue to follow for any further needs and adjust discharge plan accordingly. DC Barriers: POD 4 OHS, Cardiac monitoring, and monitor labs.               Expected Discharge Date and Time       Expected Discharge Date Expected Discharge Time    Aug 27, 2024           Phone communication or documentation only- no physical contact with patient or family.      Yumiko Pollard RN    Office Phone: [phone]  Office Cell:     [phone]

## 2024-08-26 NOTE — PROGRESS NOTES
" LOS: 4 days   Patient Care Team:  Renard Mcintosh MD as PCP - General    Chief Complaint:   Post-op follow-up, s/p ascending aneurysm repair, AV repair    Subjective  Sitting up in chair. Pain controlled on current regimen. No new complaints. Discussed possibly going home today or tomorrow.     Vital Signs  Temp:  [98.1 °F (36.7 °C)-98.7 °F (37.1 °C)] 98.7 °F (37.1 °C)  Heart Rate:  [86-97] 94  Resp:  [16] 16  BP: ()/(42-65) 105/61      08/24/24  0600 08/25/24  0539 08/26/24  0600   Weight: 94.3 kg (207 lb 12.8 oz) 93.8 kg (206 lb 14.4 oz) 93 kg (205 lb)     Body mass index is 28.6 kg/m².    Intake/Output Summary (Last 24 hours) at 8/26/2024 0953  Last data filed at 8/26/2024 0830  Gross per 24 hour   Intake 480 ml   Output 30 ml   Net 450 ml     I/O this shift:  In: 240 [P.O.:240]  Out: -       Objective:  Vital signs: (most recent): Blood pressure 105/61, pulse 94, temperature 98.7 °F (37.1 °C), temperature source Oral, resp. rate 16, height 180.3 cm (70.98\"), weight 93 kg (205 lb), SpO2 95%.                Physical Exam:   General Appearance: awake and alert, no acute distress   Lungs: clear to auscultation, respirations regular, and respirations unlabored   Heart: regular rhythm & normal rate and normal S1, S2   Abdomen: soft or nontender, + bowel sounds, +BM   Skin: sternal incision clean, dry, intact   Neuro: alert and oriented, no focal deficits.     Results Review:      WBC WBC   Date Value Ref Range Status   08/26/2024 5.12 3.40 - 10.80 10*3/mm3 Final   08/25/2024 11.25 (H) 3.40 - 10.80 10*3/mm3 Final   08/24/2024 15.76 (H) 3.40 - 10.80 10*3/mm3 Final      HGB Hemoglobin   Date Value Ref Range Status   08/26/2024 10.5 (L) 13.0 - 17.7 g/dL Final   08/25/2024 11.5 (L) 13.0 - 17.7 g/dL Final   08/24/2024 11.7 (L) 13.0 - 17.7 g/dL Final      HCT Hematocrit   Date Value Ref Range Status   08/26/2024 32.1 (L) 37.5 - 51.0 % Final   08/25/2024 35.1 (L) 37.5 - 51.0 % Final   08/24/2024 37.0 (L) 37.5 - " "51.0 % Final      Platelets Platelets   Date Value Ref Range Status   08/26/2024 171 140 - 450 10*3/mm3 Final   08/25/2024 144 140 - 450 10*3/mm3 Final   08/24/2024 149 140 - 450 10*3/mm3 Final        PT/INR:  No results found for: \"PROTIME\"/No results found for: \"INR\"    Sodium Sodium   Date Value Ref Range Status   08/26/2024 134 (L) 136 - 145 mmol/L Final   08/25/2024 132 (L) 136 - 145 mmol/L Final   08/24/2024 132 (L) 136 - 145 mmol/L Final      Potassium Potassium   Date Value Ref Range Status   08/26/2024 3.4 (L) 3.5 - 5.2 mmol/L Final   08/25/2024 4.1 3.5 - 5.2 mmol/L Final   08/24/2024 4.3 3.5 - 5.2 mmol/L Final      Chloride Chloride   Date Value Ref Range Status   08/26/2024 99 98 - 107 mmol/L Final   08/25/2024 97 (L) 98 - 107 mmol/L Final   08/24/2024 98 98 - 107 mmol/L Final      Bicarbonate CO2   Date Value Ref Range Status   08/26/2024 26.4 22.0 - 29.0 mmol/L Final   08/25/2024 26.4 22.0 - 29.0 mmol/L Final   08/24/2024 23.9 22.0 - 29.0 mmol/L Final      BUN BUN   Date Value Ref Range Status   08/26/2024 28 (H) 6 - 20 mg/dL Final   08/25/2024 20 6 - 20 mg/dL Final   08/24/2024 17 6 - 20 mg/dL Final      Creatinine Creatinine   Date Value Ref Range Status   08/26/2024 1.02 0.76 - 1.27 mg/dL Final   08/25/2024 0.94 0.76 - 1.27 mg/dL Final   08/24/2024 0.96 0.76 - 1.27 mg/dL Final      Calcium Calcium   Date Value Ref Range Status   08/26/2024 8.4 (L) 8.6 - 10.5 mg/dL Final   08/25/2024 8.6 8.6 - 10.5 mg/dL Final   08/24/2024 8.6 8.6 - 10.5 mg/dL Final      Magnesium Magnesium   Date Value Ref Range Status   08/26/2024 2.7 (H) 1.6 - 2.6 mg/dL Final        aspirin, 81 mg, Oral, Daily  atorvastatin, 40 mg, Oral, Nightly  chlorhexidine, 15 mL, Mouth/Throat, Q12H  colchicine, 0.6 mg, Oral, Q12H  enoxaparin, 40 mg, Subcutaneous, Daily  furosemide, 20 mg, Oral, Daily  gabapentin, 100 mg, Oral, BID  insulin lispro, 2-7 Units, Subcutaneous, 4x Daily AC & at Bedtime  metoprolol tartrate, 12.5 mg, Oral, " Q12H  mupirocin, , Each Nare, BID  pantoprazole, 40 mg, Oral, Q AM  polyethylene glycol, 17 g, Oral, BID  potassium chloride ER, 40 mEq, Oral, Q4H  senna-docusate sodium, 2 tablet, Oral, BID  tamsulosin, 0.4 mg, Oral, Daily             Aneurysm of ascending aorta    Nonrheumatic aortic valve insufficiency    Thoracic aortic aneurysm without rupture      Assessment & Plan    - Bicuspid aortopathy/ ascending aortic aneurysm, 5 cm/ mild to mod aortic insufficiency, EF 60-65% (cath)--s/p ascending aortic aneurysm repair/ aortic valve repair/ JUSTIN clipping (Pagni),   - HTN  - HLD--statin  - Postop leukocytosis, reactive--resolved  - Postop ABLA, expected--watch closely, stable  - Postop ST elevation c/w pericarditis-- colchicine  - BPH-- home flomax     POD# 4.      Doing well. Continue routine care.  BP borderline, hold diuretics.  Replete K.  Colchicine and toradol for pericarditis.   Mobilize and encourage pulm toilet.  Possibly home later today or tomorrow.       Alex Bailon PA-C  08/26/24  09:53 EDT

## 2024-08-26 NOTE — PROGRESS NOTES
[facility] CARDIOLOGY  [facility]        LOS:  LOS: 4 days   Patient Name: Sami Quiroga  Age/Sex: 59 y.o. male  : 1965  MRN: [id number]    Day of Service: 24   Length of Stay: 4  Encounter Provider: STEPHAN Merida  Place of Service: [facility] CARDIOLOGY  Patient Care Team:  Renard Mcintosh MD as PCP - General      Cardiology assessment and plan    Ascending aortic aneurysm with a bicuspid aortic valve status post unresolve repair and aortic valve repair with left atrial appendage closure  Normal coronaries  Normal LV systolic function  Hypertension  Hyperlipidemia  Postop pericarditis  Patient is currently in sinus rhythm and hemodynamically stable  Likely discharge home today  Denies any new cardiac symptoms  Postop day 4 cardiac valve repair and ascending aortic replacement surgery  Tmax is 97.8 pulse is 94 respirations are 16 blood pressure is 85/52 105/60 sats are 94%  Sodium is 134 potassium is 3.4 creatinine is 1.0 magnesium is 2.7 hemoglobin is 10.5  Twelve-lead EKG shows normal sinus rhythm with ST-T changes that are consistent with pericarditis  Current medications include colchicine 0.6 mg p.o. twice daily patient is on potassium replacement therapy and  Patient is on aspirin 81 mg p.o. once a day Lipitor 40 mg p.o. once a day metoprolol 12.5 mg p.o. twice daily patient is currently on Lovenox for DVT prophylaxis  Stable from cardiac standpoint  Follow-up in office in 2 weeks after discharge              Subjective:     Chief Complaint:  F/U VHD    Subjective:   Patient denies SOA.  He has ambulated in the hallway without difficulty.    Current Medications:   Scheduled Meds:aspirin, 81 mg, Oral, Daily  atorvastatin, 40 mg, Oral, Nightly  chlorhexidine, 15 mL, Mouth/Throat, Q12H  colchicine, 0.6 mg, Oral, Q12H  enoxaparin, 40 mg, Subcutaneous, Daily  gabapentin, 100 mg, Oral, BID  insulin lispro, 2-7  Units, Subcutaneous, 4x Daily AC & at Bedtime  metoprolol tartrate, 12.5 mg, Oral, Q12H  mupirocin, , Each Nare, BID  pantoprazole, 40 mg, Oral, Q AM  potassium chloride ER, 40 mEq, Oral, Q4H  senna-docusate sodium, 2 tablet, Oral, BID  tamsulosin, 0.4 mg, Oral, Daily      Continuous Infusions:     Allergies:  No Known Allergies    Review of Systems   Constitutional: Negative for chills, decreased appetite and malaise/fatigue.   HENT:  Negative for congestion and nosebleeds.    Eyes:  Negative for blurred vision and double vision.   Cardiovascular:  Negative for chest pain, dyspnea on exertion, irregular heartbeat, leg swelling, near-syncope, orthopnea and palpitations.   Respiratory:  Negative for cough and shortness of breath.    Hematologic/Lymphatic: Negative for adenopathy. Does not bruise/bleed easily.   Skin:  Negative for color change and rash.   Musculoskeletal:  Negative for back pain and joint pain.   Gastrointestinal:  Negative for bloating, abdominal pain, hematemesis and hematochezia.   Genitourinary:  Negative for flank pain and hematuria.   Neurological:  Negative for dizziness and focal weakness.   Psychiatric/Behavioral:  Negative for altered mental status. The patient does not have insomnia.        Objective:     Temp:  [98.1 °F (36.7 °C)-98.7 °F (37.1 °C)] 98.7 °F (37.1 °C)  Heart Rate:  [86-97] 94  Resp:  [16] 16  BP: ()/(53-65) 105/61     Intake/Output Summary (Last 24 hours) at 8/26/2024 1034  Last data filed at 8/26/2024 0830  Gross per 24 hour   Intake 480 ml   Output 30 ml   Net 450 ml     Body mass index is 28.6 kg/m².      08/24/24  0600 08/25/24  0539 08/26/24  0600   Weight: 94.3 kg (207 lb 12.8 oz) 93.8 kg (206 lb 14.4 oz) 93 kg (205 lb)         Physical Exam:  Neuro:  CV:  Resp:  GI:  Ext:  Tele: AAOx3, no gross deficits  S1S2 RRR, no murmur  Nonlabored, CTA  BS+, abd soft  Pedal pulses palp, no edema  SR                                                   Lab Review:   Results  from last 7 days   Lab Units 08/26/24  0607 08/25/24  0432 08/22/24  1112 08/21/24  0744   SODIUM mmol/L 134* 132*   < > 141   POTASSIUM mmol/L 3.4* 4.1   < > 4.1   CHLORIDE mmol/L 99 97*   < > 102   CO2 mmol/L 26.4 26.4   < > 26.6   BUN mg/dL 28* 20   < > 15   CREATININE mg/dL 1.02 0.94   < > 0.98   GLUCOSE mg/dL 112* 122*   < > 132*   CALCIUM mg/dL 8.4* 8.6   < > 9.5   AST (SGOT) U/L  --   --   --  32   ALT (SGPT) U/L  --   --   --  42*    < > = values in this interval not displayed.     Results from last 7 days   Lab Units 08/23/24  0910   HSTROP T ng/L 215*     Results from last 7 days   Lab Units 08/26/24  0607 08/25/24  0432   WBC 10*3/mm3 5.12 11.25*   HEMOGLOBIN g/dL 10.5* 11.5*   HEMATOCRIT % 32.1* 35.1*   PLATELETS 10*3/mm3 171 144     Results from last 7 days   Lab Units 08/23/24  0306 08/22/24  1112 08/22/24  0942   INR  1.05 1.11* 1.34*   APTT seconds  --  22.9* 21.1*     Results from last 7 days   Lab Units 08/26/24  0607 08/23/24  0306   MAGNESIUM mg/dL 2.7* 2.9*     Results from last 7 days   Lab Units 08/21/24  0744   CHOLESTEROL mg/dL 130   TRIGLYCERIDES mg/dL 154*   HDL CHOL mg/dL 36*               Recent Radiology:  Imaging Results (Most Recent)       Procedure Component Value Units Date/Time    XR Chest 1 View [[id number]] Collected: 08/25/24 1705     Updated: 08/25/24 1708    Narrative:      XR CHEST 1 VW    Date of Exam: 8/25/2024 3:58 PM EDT    Indication: s/p right mediastinal ct removal    Comparison: 8/24/2024 1056 hours    Findings:  The mediastinal chest tube has been removed. There is improved aeration throughout the lungs bilaterally. There is residual atelectasis in the lung bases. Small bilateral pleural effusions are seen. The cardiac silhouette and mediastinum are stable.   Postoperative changes are noted. The right IJ venous sheath has been removed.      Impression:      Impression:  1.Interval removal of the mediastinal chest tube and right IJ venous sheath.  2.Improved aeration  throughout the lungs bilaterally.  3.Residual atelectasis is noted in the lung bases. Small bilateral pleural effusions are noted.      Electronically Signed: Osmel Heredia MD    8/25/2024 5:06 PM EDT    Workstation ID: [id number]    XR Chest 1 View [[id number]] Collected: 08/24/24 1139     Updated: 08/24/24 1146    Narrative:      XR CHEST 1 VW    Date of Exam: 8/24/2024 11:08 AM EDT    Indication: s/p ct removal    Comparison: Chest radiograph 8/24/2024    Findings:    Stable appearing right IJ sheath. Left-sided mediastinal drain has been removed. Right-sided mediastinal drain appears in stable position.    Mediastinum: Cardiomediastinal silhouette appears unchanged including postoperative changes and accentuation secondary to low lung volumes.    Lungs: Low lung volumes with hypoventilatory change. Similar pulmonary vascular congestion and bibasal opacities accounting for differences in lung volume.    Pleura: Small bilateral pleural effusions. No visible pneumothorax.    Bones and soft tissues: No acute osseous abnormality.        Impression:      Impression:  Interval removal of left-sided mediastinal drain. Otherwise stable lines and tubes.    Similar pulmonary vascular congestion with bibasal opacities and small bilateral pleural effusions accounting for differences in lung volume.      Electronically Signed: Alvaro Dewey    8/24/2024 11:44 AM EDT    Workstation ID: [id number]    XR Chest 1 View [[id number]] Collected: 08/24/24 0735     Updated: 08/24/24 0738    Narrative:      XR CHEST 1 VW    Date of Exam: 8/24/2024 5:20 AM EDT    Indication: Post-Op Heart Surgery    Comparison: 8/23/2024    Findings:  [place]-Alan catheter is been removed. Stable right IJ vascular sheath. Postsurgical changes of sternotomy and CABG again noted. Left atrial appendage clip. Stable cardiomegaly. Increased central pulmonary vascular congestion. Negative for pneumothorax.   Worsening bibasilar airspace opacities with small  bilateral pleural effusions. Negative for pneumothorax. Stable midline chest tube.      Impression:      Impression:  1. Removal of [place]-Alan catheter with stable right IJ vascular sheath.  2. Stable midline chest tube. No pneumothorax.  3. Cardiomegaly with increased central pulmonary vascular congestion.  4. Worsening bibasilar airspace opacities which may relate to atelectasis with small bilateral pleural effusions.        Electronically Signed: Wil Rodriguez MD    8/24/2024 7:36 AM EDT    Workstation ID: [id number]    XR Chest 1 View [[id number]] Collected: 08/23/24 0713     Updated: 08/23/24 0716    Narrative:      XR CHEST 1 VW    Date of Exam: 8/23/2024 5:55 AM EDT    Indication: Post-Op Heart Surgery    Comparison: 8/22/2024    Findings:  Interval extubation and removal of esophagogastric tube. Right IJ [place] catheter stable in expected position. Postsurgical changes of sternotomy and CABG. Left atrial appendage clip. Lungs are hypoinflated with bibasilar airspace opacities left   greater than right likely atelectasis. No significant effusion. Negative for pneumothorax. Stable chest tubes overlying the midline.      Impression:      Impression:  1. Interval extubation and removal of esophagogastric tube.  2. Stable right IJ [place] catheter.  3. Hypoinflated lungs with bibasilar airspace disease left greater than right likely atelectasis.  4. No pneumothorax.        Electronically Signed: Wil Rodriguez MD    8/23/2024 7:14 AM EDT    Workstation ID: [id number]    XR Chest 1 View [[id number]] Collected: 08/22/24 1140     Updated: 08/22/24 1144    Narrative:      XR CHEST 1 VW    Date of Exam: 8/22/2024 11:39 AM EDT    Indication: Post-Op Check Line & Tube Placement    Comparison: August 21, 2024    Findings:  ET tube tip is at level of the clavicles. There is a [place] catheter with tip in the pulmonary outflow tract. There is a nasogastric tube noted with its tip at least within the stomach. There is left  atrial appendage clip. There is some atelectasis or   scarring in the right lower chest. There is slight prominence of markings in the left perihilar region which could reflect shadows from pulmonary vessels accentuated secondary to technique and depth of inspiration. Some atelectasis in this area not   excluded. There is no definite pneumothorax.      Impression:      Impression:  1.Lines and tubes as noted.  2.Atelectatic change right lower chest. There is some prominence of markings left infrahilar area that might reflect some atelectasis in this area.      Electronically Signed: Rodrigo Bhatia MD    8/22/2024 11:42 AM EDT    Workstation ID: [id number]            ECHOCARDIOGRAM:    Results for orders placed in visit on 08/22/24    Intra-Op Anesthesia CHACE    Narrative  Intra-Op Anesthesia CHACE    Procedure Performed: Intra-Op Anesthesia CHACE  Start Time:  End Time:    Preanesthesia Checklist:  Patient identified, IV assessed, risks and benefits discussed, monitors and equipment assessed, procedure being performed at surgeon's request and anesthesia consent obtained.    General Procedure Information  Diagnostic Indications for Echo:  assessment of surgical repair and hemodynamic monitoring  Location performed:  OR  Intubated  Bite block placed  Heart visualized  Probe Insertion:  Easy  Probe Type:  Biplane and multiplane  Modalities:  Color flow mapping, pulse wave Doppler and continuous wave Doppler    Echocardiographic and Doppler Measurements    Ventricles    Right Ventricle:  Cavity size normal.  Hypertrophy not present.  Thrombus not present.  Global function normal.  Ejection Fraction 60%.  Left Ventricle:  Cavity size normal.  Thrombus not present.  Global Function normal.  Ejection Fraction 60%.    Ventricular Regional Function:  1- Basal Anteroseptal:  normal  2- Basal Anterior:  normal  3- Basal Anterolateral:  normal  4- Basal Inferolateral:  normal  5- Basal Inferior:  normal  6- Basal Inferoseptal:   normal  7- Mid Anteroseptal:  normal  8- Mid Anterior:  normal  9- Mid Anterolateral:  normal  10- Mid Inferolateral:  normal  11- Mid Inferior:  normal  12- Mid Inferoseptal:  normal  13- Apical Anterior:  normal  14- Apical Lateral:  normal  15- Apical Inferior:  normal  16- Apical Septal:  normal  17- [place]:  normal      Valves    Aortic Valve:  Annulus normal.  Stenosis mild.  Regurgitation absent.  Leaflets bicuspid.  Leaflet motions restricted.    Mitral Valve:  Annulus normal.  Stenosis not present.  Regurgitation absent.  Leaflets normal.  Leaflet motions normal.    Tricuspid Valve:  Annulus normal.  Stenosis not present.  Regurgitation absent.  Leaflets normal.  Leaflet motions normal.  Pulmonic Valve:  Annulus normal.  Stenosis not present.  Regurgitation absent.      Aorta    Ascending Aorta:  Size normal.  Dissection not present.  Plaque thickness less than 3 mm.  Mobile plaque not present.  Aortic Arch:  Size normal.  Dissection not present.  Plaque thickness less than 3 mm.  Mobile plaque not present.  Descending Aorta:  Size normal.  Dissection not present.  Plaque thickness less than 3 mm.  Mobile plaque not present.      Atria    Right Atrium:  Size normal.  Left Atrium:  Size dilated.  Spontaneous echo contrast not present.  Thrombus not present.  Tumor not present.  Device not present.  Left atrial appendage normal.      Septa    Atrial Septum:  Intra-atrial septal morphology contains patent foramen ovale.  Patent foramen ovale shunts left to right.    Ventricular Septum:  Intra-ventricular septum morphology normal.        Other Findings  Pericardium:  normal  Pleural Effusion:  none  Pulmonary Arteries:  normal      Anesthesia Information  Performed Personally  Anesthesiologist:  Jethro Swan MD      Echocardiogram Comments:  CHACE placed without resistance, lubricated, bite guard      Pre cpb CHACE  LV no WMA EF 60  RV nl SF  MV wnl  TV wnl  RA dilated  AV bicuspid mild eccentric AI (toward AML)  . Dilated aortic root 3.9 CM STJ 4cm asc aorta 4.4 cm taper to nl in distal arch      Post cpb AV repair, asc aorta replace  LV RV no change  MV TV no change  AV bicuspid repair, no AI        I reviewed the patient's new clinical results.    EKG:      Assessment:       Aneurysm of ascending aorta    Nonrheumatic aortic valve insufficiency    Thoracic aortic aneurysm without rupture    1) Ascending Aortic Aneurysm with Bicuspid AV s/p aneurysm repair and AV repair with JUSTIN closure 8/22  - preop cath 6/2024 showed normal coronaries  - periop CHACE showed EF = 60%    2) HTN    3) HLD     4) hypokalemia  - K 3.4, Mg 2.7  - repleted    5) postop pericarditis  - on colchicine and toradol    Plan:   Tele shows SR.  Repeat EKG to follow-up on post-op pericarditis.  Hopefully home soon.  F/U with Dr. Lopez in 2 weeks on discharge.          Electronically signed by STEPHAN Merida, 08/26/24, 10:45 AM EDT.

## 2024-08-27 ENCOUNTER — READMISSION MANAGEMENT (OUTPATIENT)
Dept: CALL CENTER | Facility: HOSPITAL | Age: 59
End: 2024-08-27
Payer: COMMERCIAL

## 2024-08-27 VITALS
SYSTOLIC BLOOD PRESSURE: 105 MMHG | RESPIRATION RATE: 36 BRPM | WEIGHT: 202.82 LBS | BODY MASS INDEX: 28.4 KG/M2 | HEART RATE: 87 BPM | TEMPERATURE: 99.5 F | DIASTOLIC BLOOD PRESSURE: 60 MMHG | HEIGHT: 71 IN | OXYGEN SATURATION: 92 %

## 2024-08-27 LAB
ANION GAP SERPL CALCULATED.3IONS-SCNC: 8.8 MMOL/L (ref 5–15)
BUN SERPL-MCNC: 21 MG/DL (ref 6–20)
BUN/CREAT SERPL: 23.9 (ref 7–25)
CALCIUM SPEC-SCNC: 8.4 MG/DL (ref 8.6–10.5)
CHLORIDE SERPL-SCNC: 104 MMOL/L (ref 98–107)
CO2 SERPL-SCNC: 26.2 MMOL/L (ref 22–29)
CREAT SERPL-MCNC: 0.88 MG/DL (ref 0.76–1.27)
DEPRECATED RDW RBC AUTO: 43.5 FL (ref 37–54)
EGFRCR SERPLBLD CKD-EPI 2021: 99.7 ML/MIN/1.73
ERYTHROCYTE [DISTWIDTH] IN BLOOD BY AUTOMATED COUNT: 13.2 % (ref 12.3–15.4)
GLUCOSE BLDC GLUCOMTR-MCNC: 105 MG/DL (ref 70–105)
GLUCOSE BLDC GLUCOMTR-MCNC: 120 MG/DL (ref 70–105)
GLUCOSE SERPL-MCNC: 107 MG/DL (ref 65–99)
HCT VFR BLD AUTO: 33.5 % (ref 37.5–51)
HGB BLD-MCNC: 10.8 G/DL (ref 13–17.7)
MAGNESIUM SERPL-MCNC: 2.7 MG/DL (ref 1.6–2.6)
MCH RBC QN AUTO: 28.7 PG (ref 26.6–33)
MCHC RBC AUTO-ENTMCNC: 32.2 G/DL (ref 31.5–35.7)
MCV RBC AUTO: 89.1 FL (ref 79–97)
PLATELET # BLD AUTO: 197 10*3/MM3 (ref 140–450)
PMV BLD AUTO: 9.5 FL (ref 6–12)
POTASSIUM SERPL-SCNC: 4.2 MMOL/L (ref 3.5–5.2)
RBC # BLD AUTO: 3.76 10*6/MM3 (ref 4.14–5.8)
SODIUM SERPL-SCNC: 139 MMOL/L (ref 136–145)
WBC NRBC COR # BLD AUTO: 5.49 10*3/MM3 (ref 3.4–10.8)

## 2024-08-27 PROCEDURE — 83735 ASSAY OF MAGNESIUM: CPT | Performed by: THORACIC SURGERY (CARDIOTHORACIC VASCULAR SURGERY)

## 2024-08-27 PROCEDURE — 85027 COMPLETE CBC AUTOMATED: CPT | Performed by: NURSE PRACTITIONER

## 2024-08-27 PROCEDURE — 97535 SELF CARE MNGMENT TRAINING: CPT

## 2024-08-27 PROCEDURE — 97110 THERAPEUTIC EXERCISES: CPT

## 2024-08-27 PROCEDURE — 82948 REAGENT STRIP/BLOOD GLUCOSE: CPT | Performed by: THORACIC SURGERY (CARDIOTHORACIC VASCULAR SURGERY)

## 2024-08-27 PROCEDURE — 99232 SBSQ HOSP IP/OBS MODERATE 35: CPT | Performed by: INTERNAL MEDICINE

## 2024-08-27 PROCEDURE — 80048 BASIC METABOLIC PNL TOTAL CA: CPT | Performed by: NURSE PRACTITIONER

## 2024-08-27 PROCEDURE — 97530 THERAPEUTIC ACTIVITIES: CPT

## 2024-08-27 RX ORDER — ASPIRIN 81 MG/1
81 TABLET ORAL DAILY
Qty: 30 TABLET | Refills: 2 | Status: SHIPPED | OUTPATIENT
Start: 2024-08-28

## 2024-08-27 RX ORDER — AMIODARONE HYDROCHLORIDE 200 MG/1
200 TABLET ORAL
Qty: 30 TABLET | Refills: 0 | Status: SHIPPED | OUTPATIENT
Start: 2024-08-28 | End: 2024-09-04

## 2024-08-27 RX ORDER — COLCHICINE 0.6 MG/1
0.3 TABLET ORAL DAILY
Status: DISCONTINUED | OUTPATIENT
Start: 2024-08-28 | End: 2024-08-27 | Stop reason: HOSPADM

## 2024-08-27 RX ORDER — AMIODARONE HYDROCHLORIDE 200 MG/1
200 TABLET ORAL
Status: DISCONTINUED | OUTPATIENT
Start: 2024-08-27 | End: 2024-08-27 | Stop reason: HOSPADM

## 2024-08-27 RX ORDER — MIDODRINE HYDROCHLORIDE 5 MG/1
5 TABLET ORAL
Qty: 90 TABLET | Refills: 0 | Status: SHIPPED | OUTPATIENT
Start: 2024-08-27 | End: 2024-09-26

## 2024-08-27 RX ORDER — MIDODRINE HYDROCHLORIDE 5 MG/1
5 TABLET ORAL
Status: DISCONTINUED | OUTPATIENT
Start: 2024-08-27 | End: 2024-08-27 | Stop reason: HOSPADM

## 2024-08-27 RX ORDER — METOPROLOL TARTRATE 25 MG/1
12.5 TABLET, FILM COATED ORAL EVERY 12 HOURS SCHEDULED
Qty: 30 TABLET | Refills: 2 | Status: SHIPPED | OUTPATIENT
Start: 2024-08-27

## 2024-08-27 RX ORDER — HYDROCODONE BITARTRATE AND ACETAMINOPHEN 5; 325 MG/1; MG/1
1 TABLET ORAL EVERY 6 HOURS PRN
Qty: 24 TABLET | Refills: 0 | Status: SHIPPED | OUTPATIENT
Start: 2024-08-27 | End: 2024-09-02

## 2024-08-27 RX ADMIN — GABAPENTIN 100 MG: 100 CAPSULE ORAL at 09:24

## 2024-08-27 RX ADMIN — HYDROCODONE BITARTRATE AND ACETAMINOPHEN 1 TABLET: 5; 325 TABLET ORAL at 05:43

## 2024-08-27 RX ADMIN — ASPIRIN 81 MG: 81 TABLET, COATED ORAL at 09:25

## 2024-08-27 RX ADMIN — Medication 12.5 MG: at 09:25

## 2024-08-27 RX ADMIN — TAMSULOSIN HYDROCHLORIDE 0.4 MG: 0.4 CAPSULE ORAL at 09:25

## 2024-08-27 RX ADMIN — COLCHICINE 0.6 MG: 0.6 TABLET, FILM COATED ORAL at 09:25

## 2024-08-27 RX ADMIN — CHLORHEXIDINE GLUCONATE, 0.12% ORAL RINSE 15 ML: 1.2 SOLUTION DENTAL at 09:24

## 2024-08-27 RX ADMIN — SIMETHICONE 160 MG: 80 TABLET, CHEWABLE ORAL at 05:43

## 2024-08-27 RX ADMIN — MUPIROCIN 1 APPLICATION: 20 OINTMENT TOPICAL at 09:25

## 2024-08-27 RX ADMIN — PANTOPRAZOLE SODIUM 40 MG: 40 TABLET, DELAYED RELEASE ORAL at 05:43

## 2024-08-27 RX ADMIN — AMIODARONE HYDROCHLORIDE 200 MG: 200 TABLET ORAL at 11:24

## 2024-08-27 RX ADMIN — CYCLOBENZAPRINE 5 MG: 10 TABLET, FILM COATED ORAL at 09:25

## 2024-08-27 NOTE — PLAN OF CARE
Goal Outcome Evaluation:         Sami Quiroga has progressed well through cardiac levels, he is up walking independently and does well using heart hugger and maintaining sternal precautions.  Educated pt/wife on bed mobility and wife assisted pt supine to/from sit with bed flat and no rails.  All questions answered and plans for home with family at d/c.  No further skilled PT needs.  Pt has 4 HANK  home, does not have any concern about stairs, declined need to practice.

## 2024-08-27 NOTE — THERAPY TREATMENT NOTE
Subjective: Pt agreeable to therapeutic plan of care.  Cognition: arousal/alertness: Alert and Attentive    Objective:     Precautions - sternal precautions    Bed Mobility: N/A or Not attempted.   Functional Transfers: Independent     Balance: dynamic and standing Independent  Functional Ambulation: Independent    Phase 1 Cardiac Rehab Initiated - Acute Care    Sitting tolerance: >10min and supported  Standing tolerance: 5-10min    Precautions:  Mid-sternal incision; avoid scapular retraction and depression.  Cardiovascular impairment post-sx; encourage energy conservation strategies.    Therapeutic Exercise: 10 reps UE and LE AROM in supported sitting    MET level equivalent: 3.0-3.5 (Standing ADLs / showering, slow stair climbing up to 2 flights, unlimited ambulation on flat surfaces up to 15mins)      Vitals: WNL    Pain: 3 VAS  Location: Sternal pain   Interventions for pain: Repositioned  Education: Provided education on the importance of mobility in the acute care setting      Assessment: Sami Quiroga presents with ADL impairments affecting function including endurance / activity tolerance. Demonstrated functioning below baseline abilities indicate the need for continued skilled intervention while inpatient. Tolerating session today without incident. Will continue to follow and progress as tolerated.     Plan/Recommendations:   No ongoing therapy recommended post-acute care. No therapy needs.. Pt requires shower chair at discharge, which he plans to purchase on his own.     Pt desires Home at discharge. Pt cooperative; agreeable to therapeutic recommendations and plan of care.     Modified [place]: N/A = No pre-op stroke/TIA    Post-Tx Position: Up in Chair, Alarms activated, and Call light and personal items within reach  PPE: gloves

## 2024-08-27 NOTE — PROGRESS NOTES
[facility] CARDIOLOGY  [facility]        LOS:  LOS: 5 days   Patient Name: Sami Quiroga  Age/Sex: 59 y.o. male  : 1965  MRN: [id number]    Day of Service: 24   Length of Stay: 5  Encounter Provider: Brittanie Lopez MD  Place of Service: [facility] CARDIOLOGY  Patient Care Team:  Renard Mcintosh MD as PCP - General      Cardiology assessment and plan    Ascending aortic aneurysm with a bicuspid aortic valve status post unresolve repair and aortic valve repair with left atrial appendage closure  A fib  Normal coronaries  Normal LV systolic function  Hypertension  Hyperlipidemia  Postop pericarditis  Patient is currently in sinus rhythm and hemodynamically stable  Likely discharge home today  Denies any new cardiac symptoms  Postop day 5 cardiac valve repair and ascending aortic replacement surgery  Twelve-lead EKG shows normal sinus rhythm with ST-T changes that are consistent with pericarditis  Current medications include colchicine 0.6 mg p.o. twice daily patient is on potassium replacement therapy and  Patient is on aspirin 81 mg p.o. once a day Lipitor 40 mg p.o. once a day metoprolol 12.5 mg p.o. twice daily patient is currently on Lovenox for DVT prophylaxis  Stable from cardiac standpoint  Amiodarone was started by the primary surgical team secondary to atrial fibrillation episode last night and patient also was started on some midodrine for secondary to low blood pressure  Agree with discontinuing lisinopril for now  Follow-up in office in 2 weeks after discharge              Subjective:     Chief Complaint:  F/U VHD    Subjective:   Patient denies SOA.  He has ambulated in the hallway without difficulty.    Current Medications:   Scheduled Meds:amiodarone, 200 mg, Oral, Q24H  aspirin, 81 mg, Oral, Daily  atorvastatin, 40 mg, Oral, Nightly  chlorhexidine, 15 mL, Mouth/Throat, Q12H  colchicine, 0.6 mg, Oral,  Q12H  enoxaparin, 40 mg, Subcutaneous, Daily  gabapentin, 100 mg, Oral, BID  insulin lispro, 2-7 Units, Subcutaneous, 4x Daily AC & at Bedtime  metoprolol tartrate, 12.5 mg, Oral, Q12H  pantoprazole, 40 mg, Oral, Q AM  senna-docusate sodium, 2 tablet, Oral, BID  tamsulosin, 0.4 mg, Oral, Daily      Continuous Infusions:     Allergies:  No Known Allergies    Review of Systems   Constitutional: Negative for chills, decreased appetite and malaise/fatigue.   HENT:  Negative for congestion and nosebleeds.    Eyes:  Negative for blurred vision and double vision.   Cardiovascular:  Negative for chest pain, dyspnea on exertion, irregular heartbeat, leg swelling, near-syncope, orthopnea and palpitations.   Respiratory:  Negative for cough and shortness of breath.    Hematologic/Lymphatic: Negative for adenopathy. Does not bruise/bleed easily.   Skin:  Negative for color change and rash.   Musculoskeletal:  Negative for back pain and joint pain.   Gastrointestinal:  Negative for bloating, abdominal pain, hematemesis and hematochezia.   Genitourinary:  Negative for flank pain and hematuria.   Neurological:  Negative for dizziness and focal weakness.   Psychiatric/Behavioral:  Negative for altered mental status. The patient does not have insomnia.        Objective:     Temp:  [97.8 °F (36.6 °C)-99.2 °F (37.3 °C)] 99.2 °F (37.3 °C)  Heart Rate:  [] 95  Resp:  [18-29] 29  BP: ()/(51-78) 91/59     Intake/Output Summary (Last 24 hours) at 8/27/2024 1258  Last data filed at 8/27/2024 0600  Gross per 24 hour   Intake 1950 ml   Output 0 ml   Net 1950 ml     Body mass index is 28.3 kg/m².      08/25/24  0539 08/26/24  0600 08/27/24  0600   Weight: 93.8 kg (206 lb 14.4 oz) 93 kg (205 lb) 92 kg (202 lb 13.2 oz)         Physical Exam:  Neuro:  CV:  Resp:  GI:  Ext:  Tele: AAOx3, no gross deficits  S1S2 RRR, no murmur  Nonlabored, CTA  BS+, abd soft  Pedal pulses palp, no edema  SR                                                    Lab Review:   Results from last 7 days   Lab Units 08/27/24  0223 08/26/24  1710 08/26/24  0607 08/22/24  1112 08/21/24  0744   SODIUM mmol/L 139  --  134*   < > 141   POTASSIUM mmol/L 4.2 4.0 3.4*   < > 4.1   CHLORIDE mmol/L 104  --  99   < > 102   CO2 mmol/L 26.2  --  26.4   < > 26.6   BUN mg/dL 21*  --  28*   < > 15   CREATININE mg/dL 0.88  --  1.02   < > 0.98   GLUCOSE mg/dL 107*  --  112*   < > 132*   CALCIUM mg/dL 8.4*  --  8.4*   < > 9.5   AST (SGOT) U/L  --   --   --   --  32   ALT (SGPT) U/L  --   --   --   --  42*    < > = values in this interval not displayed.     Results from last 7 days   Lab Units 08/23/24  0910   HSTROP T ng/L 215*     Results from last 7 days   Lab Units 08/27/24  0223 08/26/24  0607   WBC 10*3/mm3 5.49 5.12   HEMOGLOBIN g/dL 10.8* 10.5*   HEMATOCRIT % 33.5* 32.1*   PLATELETS 10*3/mm3 197 171     Results from last 7 days   Lab Units 08/23/24  0306 08/22/24  1112 08/22/24  0942   INR  1.05 1.11* 1.34*   APTT seconds  --  22.9* 21.1*     Results from last 7 days   Lab Units 08/27/24  0223 08/26/24  0607   MAGNESIUM mg/dL 2.7* 2.7*     Results from last 7 days   Lab Units 08/21/24  0744   CHOLESTEROL mg/dL 130   TRIGLYCERIDES mg/dL 154*   HDL CHOL mg/dL 36*               Recent Radiology:  Imaging Results (Most Recent)       Procedure Component Value Units Date/Time    XR Chest 1 View [[id number]] Collected: 08/25/24 1705     Updated: 08/25/24 1708    Narrative:      XR CHEST 1 VW    Date of Exam: 8/25/2024 3:58 PM EDT    Indication: s/p right mediastinal ct removal    Comparison: 8/24/2024 1056 hours    Findings:  The mediastinal chest tube has been removed. There is improved aeration throughout the lungs bilaterally. There is residual atelectasis in the lung bases. Small bilateral pleural effusions are seen. The cardiac silhouette and mediastinum are stable.   Postoperative changes are noted. The right IJ venous sheath has been removed.      Impression:       Impression:  1.Interval removal of the mediastinal chest tube and right IJ venous sheath.  2.Improved aeration throughout the lungs bilaterally.  3.Residual atelectasis is noted in the lung bases. Small bilateral pleural effusions are noted.      Electronically Signed: Osmel Heredia MD    8/25/2024 5:06 PM EDT    Workstation ID: [id number]    XR Chest 1 View [[id number]] Collected: 08/24/24 1139     Updated: 08/24/24 1146    Narrative:      XR CHEST 1 VW    Date of Exam: 8/24/2024 11:08 AM EDT    Indication: s/p ct removal    Comparison: Chest radiograph 8/24/2024    Findings:    Stable appearing right IJ sheath. Left-sided mediastinal drain has been removed. Right-sided mediastinal drain appears in stable position.    Mediastinum: Cardiomediastinal silhouette appears unchanged including postoperative changes and accentuation secondary to low lung volumes.    Lungs: Low lung volumes with hypoventilatory change. Similar pulmonary vascular congestion and bibasal opacities accounting for differences in lung volume.    Pleura: Small bilateral pleural effusions. No visible pneumothorax.    Bones and soft tissues: No acute osseous abnormality.        Impression:      Impression:  Interval removal of left-sided mediastinal drain. Otherwise stable lines and tubes.    Similar pulmonary vascular congestion with bibasal opacities and small bilateral pleural effusions accounting for differences in lung volume.      Electronically Signed: Alvaro Dewey    8/24/2024 11:44 AM EDT    Workstation ID: [id number]    XR Chest 1 View [[id number]] Collected: 08/24/24 0735     Updated: 08/24/24 0738    Narrative:      XR CHEST 1 VW    Date of Exam: 8/24/2024 5:20 AM EDT    Indication: Post-Op Heart Surgery    Comparison: 8/23/2024    Findings:  [place]-Alan catheter is been removed. Stable right IJ vascular sheath. Postsurgical changes of sternotomy and CABG again noted. Left atrial appendage clip. Stable cardiomegaly. Increased central  pulmonary vascular congestion. Negative for pneumothorax.   Worsening bibasilar airspace opacities with small bilateral pleural effusions. Negative for pneumothorax. Stable midline chest tube.      Impression:      Impression:  1. Removal of [place]-Alan catheter with stable right IJ vascular sheath.  2. Stable midline chest tube. No pneumothorax.  3. Cardiomegaly with increased central pulmonary vascular congestion.  4. Worsening bibasilar airspace opacities which may relate to atelectasis with small bilateral pleural effusions.        Electronically Signed: Wil Rodriguez MD    8/24/2024 7:36 AM EDT    Workstation ID: [id number]    XR Chest 1 View [[id number]] Collected: 08/23/24 0713     Updated: 08/23/24 0716    Narrative:      XR CHEST 1 VW    Date of Exam: 8/23/2024 5:55 AM EDT    Indication: Post-Op Heart Surgery    Comparison: 8/22/2024    Findings:  Interval extubation and removal of esophagogastric tube. Right IJ [place]-Alan catheter stable in expected position. Postsurgical changes of sternotomy and CABG. Left atrial appendage clip. Lungs are hypoinflated with bibasilar airspace opacities left   greater than right likely atelectasis. No significant effusion. Negative for pneumothorax. Stable chest tubes overlying the midline.      Impression:      Impression:  1. Interval extubation and removal of esophagogastric tube.  2. Stable right IJ [place]-Alan catheter.  3. Hypoinflated lungs with bibasilar airspace disease left greater than right likely atelectasis.  4. No pneumothorax.        Electronically Signed: Wil Rodriguez MD    8/23/2024 7:14 AM EDT    Workstation ID: [id number]    XR Chest 1 View [[id number]] Collected: 08/22/24 1140     Updated: 08/22/24 1144    Narrative:      XR CHEST 1 VW    Date of Exam: 8/22/2024 11:39 AM EDT    Indication: Post-Op Check Line & Tube Placement    Comparison: August 21, 2024    Findings:  ET tube tip is at level of the clavicles. There is a [place]Alan catheter with tip in the  pulmonary outflow tract. There is a nasogastric tube noted with its tip at least within the stomach. There is left atrial appendage clip. There is some atelectasis or   scarring in the right lower chest. There is slight prominence of markings in the left perihilar region which could reflect shadows from pulmonary vessels accentuated secondary to technique and depth of inspiration. Some atelectasis in this area not   excluded. There is no definite pneumothorax.      Impression:      Impression:  1.Lines and tubes as noted.  2.Atelectatic change right lower chest. There is some prominence of markings left infrahilar area that might reflect some atelectasis in this area.      Electronically Signed: Rodrigo Bhatia MD    8/22/2024 11:42 AM EDT    Workstation ID: [id number]            ECHOCARDIOGRAM:    Results for orders placed in visit on 08/22/24    Intra-Op Anesthesia CHACE    Narrative  Intra-Op Anesthesia CHACE    Procedure Performed: Intra-Op Anesthesia CHACE  Start Time:  End Time:    Preanesthesia Checklist:  Patient identified, IV assessed, risks and benefits discussed, monitors and equipment assessed, procedure being performed at surgeon's request and anesthesia consent obtained.    General Procedure Information  Diagnostic Indications for Echo:  assessment of surgical repair and hemodynamic monitoring  Location performed:  OR  Intubated  Bite block placed  Heart visualized  Probe Insertion:  Easy  Probe Type:  Biplane and multiplane  Modalities:  Color flow mapping, pulse wave Doppler and continuous wave Doppler    Echocardiographic and Doppler Measurements    Ventricles    Right Ventricle:  Cavity size normal.  Hypertrophy not present.  Thrombus not present.  Global function normal.  Ejection Fraction 60%.  Left Ventricle:  Cavity size normal.  Thrombus not present.  Global Function normal.  Ejection Fraction 60%.    Ventricular Regional Function:  1- Basal Anteroseptal:  normal  2- Basal Anterior:  normal  3- Basal  Anterolateral:  normal  4- Basal Inferolateral:  normal  5- Basal Inferior:  normal  6- Basal Inferoseptal:  normal  7- Mid Anteroseptal:  normal  8- Mid Anterior:  normal  9- Mid Anterolateral:  normal  10- Mid Inferolateral:  normal  11- Mid Inferior:  normal  12- Mid Inferoseptal:  normal  13- Apical Anterior:  normal  14- Apical Lateral:  normal  15- Apical Inferior:  normal  16- Apical Septal:  normal  17- [place]:  normal      Valves    Aortic Valve:  Annulus normal.  Stenosis mild.  Regurgitation absent.  Leaflets bicuspid.  Leaflet motions restricted.    Mitral Valve:  Annulus normal.  Stenosis not present.  Regurgitation absent.  Leaflets normal.  Leaflet motions normal.    Tricuspid Valve:  Annulus normal.  Stenosis not present.  Regurgitation absent.  Leaflets normal.  Leaflet motions normal.  Pulmonic Valve:  Annulus normal.  Stenosis not present.  Regurgitation absent.      Aorta    Ascending Aorta:  Size normal.  Dissection not present.  Plaque thickness less than 3 mm.  Mobile plaque not present.  Aortic Arch:  Size normal.  Dissection not present.  Plaque thickness less than 3 mm.  Mobile plaque not present.  Descending Aorta:  Size normal.  Dissection not present.  Plaque thickness less than 3 mm.  Mobile plaque not present.      Atria    Right Atrium:  Size normal.  Left Atrium:  Size dilated.  Spontaneous echo contrast not present.  Thrombus not present.  Tumor not present.  Device not present.  Left atrial appendage normal.      Septa    Atrial Septum:  Intra-atrial septal morphology contains patent foramen ovale.  Patent foramen ovale shunts left to right.    Ventricular Septum:  Intra-ventricular septum morphology normal.        Other Findings  Pericardium:  normal  Pleural Effusion:  none  Pulmonary Arteries:  normal      Anesthesia Information  Performed Personally  Anesthesiologist:  Jethro Swan MD      Echocardiogram Comments:  CHACE placed without resistance, lubricated, bite  guard      Pre cpb CHACE  LV no WMA EF 60  RV nl SF  MV wnl  TV wnl  RA dilated  AV bicuspid mild eccentric AI (toward AML) . Dilated aortic root 3.9 CM STJ 4cm asc aorta 4.4 cm taper to nl in distal arch      Post cpb AV repair, asc aorta replace  LV RV no change  MV TV no change  AV bicuspid repair, no AI        I reviewed the patient's new clinical results.    EKG:      Assessment:       Aneurysm of ascending aorta    Nonrheumatic aortic valve insufficiency    Thoracic aortic aneurysm without rupture    1) Ascending Aortic Aneurysm with Bicuspid AV s/p aneurysm repair and AV repair with JUSTIN closure 8/22  - preop cath 6/2024 showed normal coronaries  - periop CHACE showed EF = 60%    2) HTN    3) HLD     4) hypokalemia  - K 3.4, Mg 2.7    5) postop pericarditis  - on colchicine and toradol

## 2024-08-27 NOTE — PLAN OF CARE
Goal Outcome Evaluation:  Plan of Care Reviewed With: patient        Progress: improving  Outcome Evaluation: Patient discharging home this shift.

## 2024-08-27 NOTE — THERAPY DISCHARGE NOTE
Subjective: Pt agreeable to therapeutic plan of care.    Objective:   Pt seen up walking in [place] independently.     Phase 1 Cardiac Rehab Initiated - Acute Care    Cardiac Level III Activities  Sitting tolerance: >10min  Standing tolerance: >10min    Precautions:  Mid-sternal incision; avoid scapular retraction and depression.  Cardiovascular impairment post-sx; encourage energy conservation strategies.    Therapeutic Exercise: OT issued post CABG HEP and reviewed with pt/family.  Pt verbalized understanding.     MET level equivalent: 2.0-3.0 (Unlimited sitting, ambulation on level ground <2mph, light housework)      Bed mobility - Min-A  Worked on techniques for in/OOB with bed flat and no rails.  Pts spouse present and educated how to assist pt with transition.  Also educated on use of pillows or ordering wedge pillow to elevate HOB at home.  Demonstrate understanding.   Transfers - Independent sit to/from stand  Ambulation - pt has been up walking independently with staff    Pain: 2 VAS   Location: generalized post op pain  Intervention for pain: Therapeutic Presence    Education: Transfer Training and Post-Op Precautions    Assessment: Sami Quiroga has progressed well through cardiac levels, he is up walking independently and does well using heart hugger and maintaining sternal precautions.  Educated pt/wife on bed mobility and wife assisted pt supine to/from sit with bed flat and no rails.  All questions answered and plans for home with family at d/c.  No further skilled PT needs.  Pt has 4 HANK  home, does not have any concern about stairs, declined need to practice.     Plan/Recommendations:   If medically appropriate, No ongoing therapy recommended post-acute care. No therapy needs. Pt requires no DME at discharge.     Pt desires Home with family assist at discharge. Pt cooperative; agreeable to therapeutic recommendations and plan of care.         Basic Mobility 6-click:  Rollin = Total, A lot  = 2, A little = 3; 4 = None  Supine>Sit:   1 = Total, A lot = 2, A little = 3; 4 = None   Sit>Stand with arms:  1 = Total, A lot = 2, A little = 3; 4 = None  Bed>Chair:   1 = Total, A lot = 2, A little = 3; 4 = None  Ambulate in room:  1 = Total, A lot = 2, A little = 3; 4 = None  3-5 Steps with railin = Total, A lot = 2, A little = 3; 4 = None  Score: 22    Modified Stephen: N/A = No pre-op stroke/TIA    Post-Tx Position: Up in Chair, Staff Present, and Call light and personal items within reach  PPE: gloves and surgical mask

## 2024-08-27 NOTE — DISCHARGE SUMMARY
[facility] Cardiac Surgery Discharge Summary    Date of Admission: 8/22/2024  Date of Discharge:  8/27/2024    Discharge Diagnosis:   - Bicuspid aortopathy/ ascending aortic aneurysm, 5 cm/ mild to mod aortic insufficiency, EF 60-65% (cath)--s/p ascending aortic aneurysm repair/ aortic valve repair/ JUSTIN clipping (Isai),   - HTN  - HLD--statin  - Postop leukocytosis, reactive--resolved  - Postop ABLA, expected--watch closely, stable  - Postop ST elevation c/w pericarditis-- tx with colchicine  - BPH-- home flomax    Presenting Problem/History of Present Illness  Nonrheumatic aortic valve insufficiency [I35.1]  Aneurysm of ascending aorta without rupture [I71.21]  Aneurysm of ascending aorta [I71.21]     Hospital Course  Patient is a 59 y.o. male presented with above medical history.  After having appropriate preoperative workup he was scheduled for surgery.  He was admitted the morning of the procedure.  On 822/24 he underwent elective ascending aortic aneurysm repair (30 mm Gelweave dacryon interposition graft), aortic valve repair with plication of conjoined leaflet, and left atrial appendage closure (35mm AtriClip).  He tolerated the procedure was transferred to open-heart recovery.  Full details can be found in operative report.  He remained stable and was able to be extubated shortly after surgery on postop day 0.  He remained stable overnight and was able to be de-escalated on postop day 1.  He did have ST elevation consistent with pericarditis and was started on colchicine.  On POD 2 chest tubes, central line, and Salamanca catheter were removed without issue.  On POD 3 epicardial wires were removed.  He continued to progress with increased activity, diuresis, and weaning of supplemental oxygen.  Did have intermittent borderline hypotension that was asymptomatic but made it difficult to titrate beta-blocker.  Discussed with Dr. Alex and patient was started on low-dose midodrine.  Overnight on POD4 he had a  very brief episode of atrial fibrillation with RVR that lasted less than 15 minutes.  He converted to sinus rhythm before IV amiodarone was even started.  The following morning he was started on 200 mg p.o. amnio daily remains in sinus rhythm.  On postop day 5 he was ready for discharge.  He is completed almost 5 days of colchicine and will discontinue due to the interaction with amiodarone.  Might be able to discontinue Amio and/or midodrine in the next week or 2 when he follows up with cardiology.  Plan discharge home with family.  At time of discharge he was hemodynamically stable, ambulating without issue, and on room air.Patient was provided with appropriate discharge education. He was instructed to call office with any questions or concerns.      Procedures Performed  Procedure(s):  ASCENDING AORTIC ARCH replaced with 30mm gelweave graft  [id number], left atrial appendage exclusion with 35mm atriclip,  with intraoperative CHACE    Operative Note - Dr. Alex     Date of Dictation: 08/24/24     Date of Procedure: 08/22/2020     Referring Physician: Carroll Lopez MD     Preoperative diagnosis:  1.  5 cm ascending aortic aneurysm with ascending to descending aorta index greater than 2  2.  Mild to moderate aortic insufficiency with an eccentric jet  3.  Bicuspid aortopathy     Postoperative diagnosis:  Same     Procedure:   1.  Elective ascending aortic aneurysm repair with a 30 mm Gelweave Dacron interposition graft  2.  Aortic valve repair with plication of the conjoined leaflet, release of the raphae of the same leaflet and narrowing of the sinotubular junction symmetrically to 30 mm  3.  Left atrial appendage epicardial closure with a 35 mm atrial clip device       Consults:   Consults       Date and Time Order Name Status Description    8/22/2024 11:10 AM Inpatient Cardiology Consult Completed         Cardiology-- Dr. Mcdaniel    Pertinent Test Results:    Lab Results   Component Value Date    WBC 5.49  08/27/2024    HGB 10.8 (L) 08/27/2024    HCT 33.5 (L) 08/27/2024    MCV 89.1 08/27/2024     08/27/2024      Lab Results   Component Value Date    GLUCOSE 107 (H) 08/27/2024    CALCIUM 8.4 (L) 08/27/2024     08/27/2024    K 4.2 08/27/2024    CO2 26.2 08/27/2024     08/27/2024    BUN 21 (H) 08/27/2024    CREATININE 0.88 08/27/2024    EGFRIFAFRI 97 10/05/2020    EGFRIFNONA 84 10/05/2020    BCR 23.9 08/27/2024    ANIONGAP 8.8 08/27/2024     Lab Results   Component Value Date    INR 1.05 08/23/2024    PROTIME 11.4 08/23/2024         Condition on Discharge:    stable    Vital Signs  Temp:  [98.8 °F (37.1 °C)-99.2 °F (37.3 °C)] 99.2 °F (37.3 °C)  Heart Rate:  [] 91  Resp:  [18-29] 29  BP: ()/(51-78) 98/64      Discharge Disposition  Home or Self Care    Discharge Medications     Discharge Medications        New Medications        Instructions Start Date   amiodarone 200 MG tablet  Commonly known as: PACERONE   200 mg, Oral, Every 24 Hours Scheduled   Start Date: August 28, 2024     Aspirin Low Dose 81 MG EC tablet  Generic drug: aspirin   81 mg, Oral, Daily   Start Date: August 28, 2024     HYDROcodone-acetaminophen 5-325 MG per tablet  Commonly known as: NORCO   1 tablet, Oral, Every 6 Hours PRN      metoprolol tartrate 25 MG tablet  Commonly known as: LOPRESSOR   12.5 mg, Oral, Every 12 Hours Scheduled      midodrine 5 MG tablet  Commonly known as: PROAMATINE   5 mg, Oral, 3 Times Daily Before Meals             Continue These Medications        Instructions Start Date   atorvastatin 20 MG tablet  Commonly known as: LIPITOR   20 mg, Oral, Every Night at Bedtime      tamsulosin 0.4 MG capsule 24 hr capsule  Commonly known as: FLOMAX   1 capsule, Oral, Daily             Stop These Medications      lisinopril 5 MG tablet  Commonly known as: PRINIVIL,ZESTRIL     metoprolol succinate XL 25 MG 24 hr tablet  Commonly known as: TOPROL-XL     mupirocin 2 % ointment  Commonly known as: BACTROBAN               Discharge Diet:   Heart healthy    Activity at Discharge:   1. No driving for 2 weeks and off narcotic pain medications.  2. Shower daily. Clean incisions with warm water and antibacterial soap only. Do not put any lotion or ointments on incisions.  3. Ambulate for 10 minutes at least 3 times a day.  4. No heavy lifting > 10lbs until seen in office.   5. Take all medications as prescribed.     Follow-up Appointments:  Future Appointments   Date Time Provider Department Center   9/12/2024  1:00 PM Yoana Vargas APRN [facility]     Additional Instructions for the Follow-ups that You Need to Schedule       Call MD With Problems / Concerns   As directed      Instructions:  Call office at [phone] for any drainage, increased redness, or fever over 100.5    Order Comments: Instructions:  Call office at [phone] for any drainage, increased redness, or fever over 100.5         Discharge Follow-up with PCP   As directed       Currently Documented PCP:    Renard Mcintosh MD    PCP Phone Number:    [phone]     Follow Up Details: in 1 week        Discharge Follow-up with Specialty: Cardiologist STEPHAN/PA; 1 Week   As directed      Specialty: Cardiologist STEPHAN/PA   Follow Up: 1 Week   Follow Up Details: bring all prescription bottles to appointment, call for appointment        Discharge Follow-up with Specified Provider: Cardiologist; 1 Month   As directed      To: Cardiologist   Follow Up: 1 Month   Follow Up Details: call for appointment, bring all medication bottles to appointment        Discharge Follow-up with Specified Provider: cardiac surgery office   As directed      To: cardiac surgery office   Follow Up Details: 4-6 weeks, bring all current medications to appointment                STS info: on ASA, statin, BB    Test Results Pending at Discharge:  None         Alex Bailon PA-C  08/27/24  15:21 EDT

## 2024-08-27 NOTE — PROGRESS NOTES
" LOS: 5 days   Patient Care Team:  Renard Mcintosh MD as PCP - General    Chief Complaint:   Post-op follow-up, s/p AVr/ascending aorta replacement    Subjective  Sitting up in chair.  States he feels well this morning.  Did have palpitations during episode of A-fib overnight.  Denies any lightheadedness or dizziness.  No new complaints.    Vital Signs  Temp:  [97.7 °F (36.5 °C)-98.9 °F (37.2 °C)] 98.8 °F (37.1 °C)  Heart Rate:  [] 97  Resp:  [18-27] 27  BP: ()/(55-72) 110/72      08/25/24  0539 08/26/24  0600 08/27/24  0600   Weight: 93.8 kg (206 lb 14.4 oz) 93 kg (205 lb) 92 kg (202 lb 13.2 oz)     Body mass index is 28.3 kg/m².    Intake/Output Summary (Last 24 hours) at 8/27/2024 1011  Last data filed at 8/27/2024 0600  Gross per 24 hour   Intake 1950 ml   Output 0 ml   Net 1950 ml     No intake/output data recorded.        Objective:  Vital signs: (most recent): Blood pressure 110/72, pulse 97, temperature 98.8 °F (37.1 °C), temperature source Oral, resp. rate 27, height 180.3 cm (70.98\"), weight 92 kg (202 lb 13.2 oz), SpO2 93%.                Physical Exam:   General Appearance: awake and alert, no acute distress   Lungs: clear to auscultation, respirations regular, and respirations unlabored   Heart: regular rhythm & normal rate and normal S1, S2   Abdomen: soft or nontender, + bowel sounds    Skin: sternal incision clean, dry, intact   Neuro: alert and oriented, no focal deficits.     Results Review:      WBC WBC   Date Value Ref Range Status   08/27/2024 5.49 3.40 - 10.80 10*3/mm3 Final   08/26/2024 5.12 3.40 - 10.80 10*3/mm3 Final   08/25/2024 11.25 (H) 3.40 - 10.80 10*3/mm3 Final      HGB Hemoglobin   Date Value Ref Range Status   08/27/2024 10.8 (L) 13.0 - 17.7 g/dL Final   08/26/2024 10.5 (L) 13.0 - 17.7 g/dL Final   08/25/2024 11.5 (L) 13.0 - 17.7 g/dL Final      HCT Hematocrit   Date Value Ref Range Status   08/27/2024 33.5 (L) 37.5 - 51.0 % Final   08/26/2024 32.1 (L) 37.5 - 51.0 " "% Final   08/25/2024 35.1 (L) 37.5 - 51.0 % Final      Platelets Platelets   Date Value Ref Range Status   08/27/2024 197 140 - 450 10*3/mm3 Final   08/26/2024 171 140 - 450 10*3/mm3 Final   08/25/2024 144 140 - 450 10*3/mm3 Final        PT/INR:  No results found for: \"PROTIME\"/No results found for: \"INR\"    Sodium Sodium   Date Value Ref Range Status   08/27/2024 139 136 - 145 mmol/L Final   08/26/2024 134 (L) 136 - 145 mmol/L Final   08/25/2024 132 (L) 136 - 145 mmol/L Final      Potassium Potassium   Date Value Ref Range Status   08/27/2024 4.2 3.5 - 5.2 mmol/L Final   08/26/2024 4.0 3.5 - 5.2 mmol/L Final   08/26/2024 3.4 (L) 3.5 - 5.2 mmol/L Final   08/25/2024 4.1 3.5 - 5.2 mmol/L Final      Chloride Chloride   Date Value Ref Range Status   08/27/2024 104 98 - 107 mmol/L Final   08/26/2024 99 98 - 107 mmol/L Final   08/25/2024 97 (L) 98 - 107 mmol/L Final      Bicarbonate CO2   Date Value Ref Range Status   08/27/2024 26.2 22.0 - 29.0 mmol/L Final   08/26/2024 26.4 22.0 - 29.0 mmol/L Final   08/25/2024 26.4 22.0 - 29.0 mmol/L Final      BUN BUN   Date Value Ref Range Status   08/27/2024 21 (H) 6 - 20 mg/dL Final   08/26/2024 28 (H) 6 - 20 mg/dL Final   08/25/2024 20 6 - 20 mg/dL Final      Creatinine Creatinine   Date Value Ref Range Status   08/27/2024 0.88 0.76 - 1.27 mg/dL Final   08/26/2024 1.02 0.76 - 1.27 mg/dL Final   08/25/2024 0.94 0.76 - 1.27 mg/dL Final      Calcium Calcium   Date Value Ref Range Status   08/27/2024 8.4 (L) 8.6 - 10.5 mg/dL Final   08/26/2024 8.4 (L) 8.6 - 10.5 mg/dL Final   08/25/2024 8.6 8.6 - 10.5 mg/dL Final      Magnesium Magnesium   Date Value Ref Range Status   08/27/2024 2.7 (H) 1.6 - 2.6 mg/dL Final   08/26/2024 2.7 (H) 1.6 - 2.6 mg/dL Final        amiodarone, 200 mg, Oral, Q24H  aspirin, 81 mg, Oral, Daily  atorvastatin, 40 mg, Oral, Nightly  chlorhexidine, 15 mL, Mouth/Throat, Q12H  colchicine, 0.6 mg, Oral, Q12H  enoxaparin, 40 mg, Subcutaneous, Daily  gabapentin, 100 mg, " Oral, BID  insulin lispro, 2-7 Units, Subcutaneous, 4x Daily AC & at Bedtime  metoprolol tartrate, 12.5 mg, Oral, Q12H  pantoprazole, 40 mg, Oral, Q AM  senna-docusate sodium, 2 tablet, Oral, BID  tamsulosin, 0.4 mg, Oral, Daily             Aneurysm of ascending aorta    Nonrheumatic aortic valve insufficiency    Thoracic aortic aneurysm without rupture      Assessment & Plan    - Bicuspid aortopathy/ ascending aortic aneurysm, 5 cm/ mild to mod aortic insufficiency, EF 60-65% (cath)--s/p ascending aortic aneurysm repair/ aortic valve repair/ JUSTIN clipping (Pagni),   - HTN  - HLD--statin  - Postop leukocytosis, reactive--resolved  - Postop ABLA, expected--watch closely, stable  - Postop ST elevation c/w pericarditis-- colchicine  - BPH-- home flomax     POD# 5.    Brief episode of A-fib with RVR overnight ~15 minutes.  Converted back to sinus rhythm before IV Amio started.  BP unlikely to tolerate increased beta-blocker, will start p.o. amio.  Magnesium and potassium are normal.  On colchicine for pericarditis.  Mobilize and encourage pulm toilet.  Monitor rhythm and BP.  Possibly home later today or tomorrow.    Alex Bailon PA-C  08/27/24  10:11 EDT

## 2024-08-27 NOTE — OUTREACH NOTE
Prep Survey      Flowsheet Row Responses   Religion facility patient discharged from? [place]   Is LACE score < 7 ? No   Eligibility [facility]   Date of Admission 08/22/24   Date of Discharge 08/27/24   Discharge Disposition Home or Self Care   Discharge diagnosis Ascending aortic arch with CHACE   Does the patient have one of the following disease processes/diagnoses(primary or secondary)? Cardiothoracic surgery   Does the patient have Home health ordered? No   Is there a DME ordered? No   Prep survey completed? Yes            ISIDRO FLOYD - Registered Nurse

## 2024-08-27 NOTE — CONSULTS
Cardiac Rehab evaluation s/p AVRepair. Explained cardiac rehab program and benefits. Brochure, post op activity list and healthy heart packet given. [facility] would be closer. Will fax information there.

## 2024-08-27 NOTE — CASE MANAGEMENT/SOCIAL WORK
Continued Stay Note  FROILAN Allen     Patient Name: Sami Quiroga  MRN: [id number]  Today's Date: 8/27/2024    Admit Date: 8/22/2024    Plan: DC Plan: Anticipate routine home with family assist. Aortic Arch Repair 8/22/2024.   Discharge Plan       Row Name 08/27/24 1349       Plan    Plan DC Plan: Anticipate routine home with family assist. Aortic Arch Repair 8/22/2024.    Patient/Family in Agreement with Plan yes    Provided Post Acute Provider List? N/A    Provided Post Acute Provider Quality & Resource List? N/A    N/A Quality & Resource List Comment CM spoke with patient’s nurse and also reviewed chart documentation to obtain clinical updates. Nursing reports patient had short stent of Atrial Fibrillation that converted to NSR spontaneously. Patient started on oral amiodarone. DC may be delayed until tomorrow 8/28/2024.CM will continue to follow for any further needs and adjust discharge plan accordingly. DC Barriers: POD 5 OHS, Cardiac monitoring.               Expected Discharge Date and Time       Expected Discharge Date Expected Discharge Time    Aug 28, 2024           Phone communication or documentation only- no physical contact with patient or family.      Yumiko Pollard RN     Office Phone: [phone]  Office Cell:     [phone]

## 2024-08-27 NOTE — PLAN OF CARE
Goal Outcome Evaluation:      Patient had small runs of A. Fib RVR around 2am. Maintained A. Fib RVR from 0214 until 0231. Orders placed for amiodarone protocol but not initiated. Patient converted to NSR with no issues.

## 2024-08-28 ENCOUNTER — TRANSITIONAL CARE MANAGEMENT TELEPHONE ENCOUNTER (OUTPATIENT)
Dept: CALL CENTER | Facility: HOSPITAL | Age: 59
End: 2024-08-28
Payer: COMMERCIAL

## 2024-08-28 NOTE — PAYOR COMM NOTE
"This is discharge notification for Sami Quiroga.    Discharged on 8/27/24 to home.     Reference/Auth#: [id number] (approved 8/22-8/23/24)    EXTENDED AUTHORIZATION PENDING:     Please call or fax determination to contact below.   Thank you.    Katharine Felipe, RN, [facility]  Utilization Review Nurse  [address] [phone]  Fx [phone]       Sami Quiroga (59 y.o. Male)       Date of Birth   1965    Social Security Number       Address   [address] [place] [address]    Home Phone   [phone]    MRN   [id number]       B[place]   None    Marital Status                               Admission Date   8/22/24    Admission Type   Elective    Admitting Provider   Carlos Alex MD    Attending Provider       Department, Room/Bed   [facility] CARDIOVASCULAR CARE UNIT, 3114/1       Discharge Date   8/27/2024    Discharge Disposition   Home or Self Care    Discharge Destination   Home                              Attending Provider: (none)   Allergies: No Known Allergies    Isolation: None   Infection: None   Code Status: Prior    Ht: 180.3 cm (70.98\")   Wt: 92 kg (202 lb 13.2 oz)    Admission Cmt: None   Principal Problem: Aneurysm of ascending aorta [I71.21]                   Active Insurance as of 8/22/2024       Primary Coverage       Payor Plan Insurance Group Employer/Plan Group    ANTHEM BLUE CROSS ANTH BLUE MPV BLUE SHIELD PPO [id number]       Payor Plan Address Payor Plan Phone Number Payor Plan Fax Number Effective Dates    [address] [phone]  1/1/2021 - None Entered    [facility] [address]         Subscriber Name Subscriber Birth Date Member ID       SAMI QUIROGA 1965 [id number]                     Emergency Contacts        (Rel.) Home Phone Work Phone Mobile Phone    LENA QUIROGA \"DOTTY\" (Spouse) [phone] -- --              Operative/Procedure Notes (last " 48 hours)  Notes from 24 through 24   No notes of this type exist for this encounter.          Physician Progress Notes (last 48 hours)        Brittanie Lopez MD at 24 1258          [facility] CARDIOLOGY  [facility]        LOS:  LOS: 5 days   Patient Name: Sami Quiroga  Age/Sex: 59 y.o. male  : 1965  MRN: [id number]    Day of Service: 24   Length of Stay: 5  Encounter Provider: Brittanie Lopez MD  Place of Service: [facility] CARDIOLOGY  Patient Care Team:  Renard Mcintosh MD as PCP - General      Cardiology assessment and plan    Ascending aortic aneurysm with a bicuspid aortic valve status post unresolve repair and aortic valve repair with left atrial appendage closure  A fib  Normal coronaries  Normal LV systolic function  Hypertension  Hyperlipidemia  Postop pericarditis  Patient is currently in sinus rhythm and hemodynamically stable  Likely discharge home today  Denies any new cardiac symptoms  Postop day 5 cardiac valve repair and ascending aortic replacement surgery  Twelve-lead EKG shows normal sinus rhythm with ST-T changes that are consistent with pericarditis  Current medications include colchicine 0.6 mg p.o. twice daily patient is on potassium replacement therapy and  Patient is on aspirin 81 mg p.o. once a day Lipitor 40 mg p.o. once a day metoprolol 12.5 mg p.o. twice daily patient is currently on Lovenox for DVT prophylaxis  Stable from cardiac standpoint  Amiodarone was started by the primary surgical team secondary to atrial fibrillation episode last night and patient also was started on some midodrine for secondary to low blood pressure  Agree with discontinuing lisinopril for now  Follow-up in office in 2 weeks after discharge              Subjective:     Chief Complaint:  F/U VHD    Subjective:   Patient denies SOA.  He has ambulated in the hallway without  difficulty.    Current Medications:   Scheduled Meds:amiodarone, 200 mg, Oral, Q24H  aspirin, 81 mg, Oral, Daily  atorvastatin, 40 mg, Oral, Nightly  chlorhexidine, 15 mL, Mouth/Throat, Q12H  colchicine, 0.6 mg, Oral, Q12H  enoxaparin, 40 mg, Subcutaneous, Daily  gabapentin, 100 mg, Oral, BID  insulin lispro, 2-7 Units, Subcutaneous, 4x Daily AC & at Bedtime  metoprolol tartrate, 12.5 mg, Oral, Q12H  pantoprazole, 40 mg, Oral, Q AM  senna-docusate sodium, 2 tablet, Oral, BID  tamsulosin, 0.4 mg, Oral, Daily      Continuous Infusions:     Allergies:  No Known Allergies    Review of Systems   Constitutional: Negative for chills, decreased appetite and malaise/fatigue.   HENT:  Negative for congestion and nosebleeds.    Eyes:  Negative for blurred vision and double vision.   Cardiovascular:  Negative for chest pain, dyspnea on exertion, irregular heartbeat, leg swelling, near-syncope, orthopnea and palpitations.   Respiratory:  Negative for cough and shortness of breath.    Hematologic/Lymphatic: Negative for adenopathy. Does not bruise/bleed easily.   Skin:  Negative for color change and rash.   Musculoskeletal:  Negative for back pain and joint pain.   Gastrointestinal:  Negative for bloating, abdominal pain, hematemesis and hematochezia.   Genitourinary:  Negative for flank pain and hematuria.   Neurological:  Negative for dizziness and focal weakness.   Psychiatric/Behavioral:  Negative for altered mental status. The patient does not have insomnia.        Objective:     Temp:  [97.8 °F (36.6 °C)-99.2 °F (37.3 °C)] 99.2 °F (37.3 °C)  Heart Rate:  [] 95  Resp:  [18-29] 29  BP: ()/(51-78) 91/59     Intake/Output Summary (Last 24 hours) at 8/27/2024 1258  Last data filed at 8/27/2024 0600  Gross per 24 hour   Intake 1950 ml   Output 0 ml   Net 1950 ml     Body mass index is 28.3 kg/m².      08/25/24  0539 08/26/24  0600 08/27/24  0600   Weight: 93.8 kg (206 lb 14.4 oz) 93 kg (205 lb) 92 kg (202 lb 13.2 oz)          Physical Exam:  Neuro:  CV:  Resp:  GI:  Ext:  Tele: AAOx3, no gross deficits  S1S2 RRR, no murmur  Nonlabored, CTA  BS+, abd soft  Pedal pulses palp, no edema  SR                                                   Lab Review:   Results from last 7 days   Lab Units 08/27/24  0223 08/26/24  1710 08/26/24  0607 08/22/24  1112 08/21/24  0744   SODIUM mmol/L 139  --  134*   < > 141   POTASSIUM mmol/L 4.2 4.0 3.4*   < > 4.1   CHLORIDE mmol/L 104  --  99   < > 102   CO2 mmol/L 26.2  --  26.4   < > 26.6   BUN mg/dL 21*  --  28*   < > 15   CREATININE mg/dL 0.88  --  1.02   < > 0.98   GLUCOSE mg/dL 107*  --  112*   < > 132*   CALCIUM mg/dL 8.4*  --  8.4*   < > 9.5   AST (SGOT) U/L  --   --   --   --  32   ALT (SGPT) U/L  --   --   --   --  42*    < > = values in this interval not displayed.     Results from last 7 days   Lab Units 08/23/24  0910   HSTROP T ng/L 215*     Results from last 7 days   Lab Units 08/27/24 0223 08/26/24  0607   WBC 10*3/mm3 5.49 5.12   HEMOGLOBIN g/dL 10.8* 10.5*   HEMATOCRIT % 33.5* 32.1*   PLATELETS 10*3/mm3 197 171     Results from last 7 days   Lab Units 08/23/24  0306 08/22/24  1112 08/22/24  0942   INR  1.05 1.11* 1.34*   APTT seconds  --  22.9* 21.1*     Results from last 7 days   Lab Units 08/27/24 0223 08/26/24  0607   MAGNESIUM mg/dL 2.7* 2.7*     Results from last 7 days   Lab Units 08/21/24  0744   CHOLESTEROL mg/dL 130   TRIGLYCERIDES mg/dL 154*   HDL CHOL mg/dL 36*               Recent Radiology:  Imaging Results (Most Recent)       Procedure Component Value Units Date/Time    XR Chest 1 View [[id number]] Collected: 08/25/24 1705     Updated: 08/25/24 1708    Narrative:      XR CHEST 1 VW    Date of Exam: 8/25/2024 3:58 PM EDT    Indication: s/p right mediastinal ct removal    Comparison: 8/24/2024 1056 hours    Findings:  The mediastinal chest tube has been removed. There is improved aeration throughout the lungs bilaterally. There is residual atelectasis in the lung bases.  Small bilateral pleural effusions are seen. The cardiac silhouette and mediastinum are stable.   Postoperative changes are noted. The right IJ venous sheath has been removed.      Impression:      Impression:  1.Interval removal of the mediastinal chest tube and right IJ venous sheath.  2.Improved aeration throughout the lungs bilaterally.  3.Residual atelectasis is noted in the lung bases. Small bilateral pleural effusions are noted.      Electronically Signed: Osmel Heredia MD    8/25/2024 5:06 PM EDT    Workstation ID: [id number]    XR Chest 1 View [[id number]] Collected: 08/24/24 1139     Updated: 08/24/24 1146    Narrative:      XR CHEST 1 VW    Date of Exam: 8/24/2024 11:08 AM EDT    Indication: s/p ct removal    Comparison: Chest radiograph 8/24/2024    Findings:    Stable appearing right IJ sheath. Left-sided mediastinal drain has been removed. Right-sided mediastinal drain appears in stable position.    Mediastinum: Cardiomediastinal silhouette appears unchanged including postoperative changes and accentuation secondary to low lung volumes.    Lungs: Low lung volumes with hypoventilatory change. Similar pulmonary vascular congestion and bibasal opacities accounting for differences in lung volume.    Pleura: Small bilateral pleural effusions. No visible pneumothorax.    Bones and soft tissues: No acute osseous abnormality.        Impression:      Impression:  Interval removal of left-sided mediastinal drain. Otherwise stable lines and tubes.    Similar pulmonary vascular congestion with bibasal opacities and small bilateral pleural effusions accounting for differences in lung volume.      Electronically Signed: Alvaro Dewey    8/24/2024 11:44 AM EDT    Workstation ID: [id number]    XR Chest 1 View [[id number]] Collected: 08/24/24 0735     Updated: 08/24/24 0738    Narrative:      XR CHEST 1 VW    Date of Exam: 8/24/2024 5:20 AM EDT    Indication: Post-Op Heart Surgery    Comparison:  8/23/2024    Findings:  [place]-Alan catheter is been removed. Stable right IJ vascular sheath. Postsurgical changes of sternotomy and CABG again noted. Left atrial appendage clip. Stable cardiomegaly. Increased central pulmonary vascular congestion. Negative for pneumothorax.   Worsening bibasilar airspace opacities with small bilateral pleural effusions. Negative for pneumothorax. Stable midline chest tube.      Impression:      Impression:  1. Removal of [place]-Alan catheter with stable right IJ vascular sheath.  2. Stable midline chest tube. No pneumothorax.  3. Cardiomegaly with increased central pulmonary vascular congestion.  4. Worsening bibasilar airspace opacities which may relate to atelectasis with small bilateral pleural effusions.        Electronically Signed: Wil Rodriguez MD    8/24/2024 7:36 AM EDT    Workstation ID: [id number]    XR Chest 1 View [[id number]] Collected: 08/23/24 0713     Updated: 08/23/24 0716    Narrative:      XR CHEST 1 VW    Date of Exam: 8/23/2024 5:55 AM EDT    Indication: Post-Op Heart Surgery    Comparison: 8/22/2024    Findings:  Interval extubation and removal of esophagogastric tube. Right IJ [place] catheter stable in expected position. Postsurgical changes of sternotomy and CABG. Left atrial appendage clip. Lungs are hypoinflated with bibasilar airspace opacities left   greater than right likely atelectasis. No significant effusion. Negative for pneumothorax. Stable chest tubes overlying the midline.      Impression:      Impression:  1. Interval extubation and removal of esophagogastric tube.  2. Stable right IJ [place]-Alan catheter.  3. Hypoinflated lungs with bibasilar airspace disease left greater than right likely atelectasis.  4. No pneumothorax.        Electronically Signed: Wil Rodriguez MD    8/23/2024 7:14 AM EDT    Workstation ID: [id number]    XR Chest 1 View [[id number]] Collected: 08/22/24 1140     Updated: 08/22/24 1144    Narrative:      XR CHEST 1 VW    Date of  Exam: 8/22/2024 11:39 AM EDT    Indication: Post-Op Check Line & Tube Placement    Comparison: August 21, 2024    Findings:  ET tube tip is at level of the clavicles. There is a [place]-Alan catheter with tip in the pulmonary outflow tract. There is a nasogastric tube noted with its tip at least within the stomach. There is left atrial appendage clip. There is some atelectasis or   scarring in the right lower chest. There is slight prominence of markings in the left perihilar region which could reflect shadows from pulmonary vessels accentuated secondary to technique and depth of inspiration. Some atelectasis in this area not   excluded. There is no definite pneumothorax.      Impression:      Impression:  1.Lines and tubes as noted.  2.Atelectatic change right lower chest. There is some prominence of markings left infrahilar area that might reflect some atelectasis in this area.      Electronically Signed: Rodrigo Bhatia MD    8/22/2024 11:42 AM EDT    Workstation ID: [id number]            ECHOCARDIOGRAM:    Results for orders placed in visit on 08/22/24    Intra-Op Anesthesia CHACE    Narrative  Intra-Op Anesthesia CHACE    Procedure Performed: Intra-Op Anesthesia CHACE  Start Time:  End Time:    Preanesthesia Checklist:  Patient identified, IV assessed, risks and benefits discussed, monitors and equipment assessed, procedure being performed at surgeon's request and anesthesia consent obtained.    General Procedure Information  Diagnostic Indications for Echo:  assessment of surgical repair and hemodynamic monitoring  Location performed:  OR  Intubated  Bite block placed  Heart visualized  Probe Insertion:  Easy  Probe Type:  Biplane and multiplane  Modalities:  Color flow mapping, pulse wave Doppler and continuous wave Doppler    Echocardiographic and Doppler Measurements    Ventricles    Right Ventricle:  Cavity size normal.  Hypertrophy not present.  Thrombus not present.  Global function normal.  Ejection Fraction  60%.  Left Ventricle:  Cavity size normal.  Thrombus not present.  Global Function normal.  Ejection Fraction 60%.    Ventricular Regional Function:  1- Basal Anteroseptal:  normal  2- Basal Anterior:  normal  3- Basal Anterolateral:  normal  4- Basal Inferolateral:  normal  5- Basal Inferior:  normal  6- Basal Inferoseptal:  normal  7- Mid Anteroseptal:  normal  8- Mid Anterior:  normal  9- Mid Anterolateral:  normal  10- Mid Inferolateral:  normal  11- Mid Inferior:  normal  12- Mid Inferoseptal:  normal  13- Apical Anterior:  normal  14- Apical Lateral:  normal  15- Apical Inferior:  normal  16- Apical Septal:  normal  17- [place]:  normal      Valves    Aortic Valve:  Annulus normal.  Stenosis mild.  Regurgitation absent.  Leaflets bicuspid.  Leaflet motions restricted.    Mitral Valve:  Annulus normal.  Stenosis not present.  Regurgitation absent.  Leaflets normal.  Leaflet motions normal.    Tricuspid Valve:  Annulus normal.  Stenosis not present.  Regurgitation absent.  Leaflets normal.  Leaflet motions normal.  Pulmonic Valve:  Annulus normal.  Stenosis not present.  Regurgitation absent.      Aorta    Ascending Aorta:  Size normal.  Dissection not present.  Plaque thickness less than 3 mm.  Mobile plaque not present.  Aortic Arch:  Size normal.  Dissection not present.  Plaque thickness less than 3 mm.  Mobile plaque not present.  Descending Aorta:  Size normal.  Dissection not present.  Plaque thickness less than 3 mm.  Mobile plaque not present.      Atria    Right Atrium:  Size normal.  Left Atrium:  Size dilated.  Spontaneous echo contrast not present.  Thrombus not present.  Tumor not present.  Device not present.  Left atrial appendage normal.      Septa    Atrial Septum:  Intra-atrial septal morphology contains patent foramen ovale.  Patent foramen ovale shunts left to right.    Ventricular Septum:  Intra-ventricular septum morphology normal.        Other Findings  Pericardium:  normal  Pleural  Effusion:  none  Pulmonary Arteries:  normal      Anesthesia Information  Performed Personally  Anesthesiologist:  Jethro Swan MD      Echocardiogram Comments:  CHACE placed without resistance, lubricated, bite guard      Pre cpb CHACE  LV no WMA EF 60  RV nl SF  MV wnl  TV wnl  RA dilated  AV bicuspid mild eccentric AI (toward AML) . Dilated aortic root 3.9 CM STJ 4cm asc aorta 4.4 cm taper to nl in distal arch      Post cpb AV repair, asc aorta replace  LV RV no change  MV TV no change  AV bicuspid repair, no AI        I reviewed the patient's new clinical results.    EKG:      Assessment:       Aneurysm of ascending aorta    Nonrheumatic aortic valve insufficiency    Thoracic aortic aneurysm without rupture    1) Ascending Aortic Aneurysm with Bicuspid AV s/p aneurysm repair and AV repair with JUSTIN closure 8/22  - preop cath 6/2024 showed normal coronaries  - periop CHACE showed EF = 60%    2) HTN    3) HLD     4) hypokalemia  - K 3.4, Mg 2.7    5) postop pericarditis  - on colchicine and toradol         Electronically signed by Brittanie Lopez MD at 08/27/24 1930       Alex Bailon PA-C at 08/27/24 1010       Attestation signed by Lindsey Luna MD at 08/27/24 1430    I have reviewed this documentation and agree.  Brief A fib overnight which resolved before IV amio was given. Has remained in NSR since then. Home today.                 LOS: 5 days   Patient Care Team:  Renard Mcintosh MD as PCP - General    Chief Complaint:   Post-op follow-up, s/p AVr/ascending aorta replacement    Subjective  Sitting up in chair.  States he feels well this morning.  Did have palpitations during episode of A-fib overnight.  Denies any lightheadedness or dizziness.  No new complaints.    Vital Signs  Temp:  [97.7 °F (36.5 °C)-98.9 °F (37.2 °C)] 98.8 °F (37.1 °C)  Heart Rate:  [] 97  Resp:  [18-27] 27  BP: ()/(55-72) 110/72      08/25/24  0539 08/26/24  0600 08/27/24  0600   Weight: 93.8 kg (206  "lb 14.4 oz) 93 kg (205 lb) 92 kg (202 lb 13.2 oz)     Body mass index is 28.3 kg/m².    Intake/Output Summary (Last 24 hours) at 8/27/2024 1011  Last data filed at 8/27/2024 0600  Gross per 24 hour   Intake 1950 ml   Output 0 ml   Net 1950 ml     No intake/output data recorded.        Objective:  Vital signs: (most recent): Blood pressure 110/72, pulse 97, temperature 98.8 °F (37.1 °C), temperature source Oral, resp. rate 27, height 180.3 cm (70.98\"), weight 92 kg (202 lb 13.2 oz), SpO2 93%.                Physical Exam:   General Appearance: awake and alert, no acute distress   Lungs: clear to auscultation, respirations regular, and respirations unlabored   Heart: regular rhythm & normal rate and normal S1, S2   Abdomen: soft or nontender, + bowel sounds    Skin: sternal incision clean, dry, intact   Neuro: alert and oriented, no focal deficits.     Results Review:      WBC WBC   Date Value Ref Range Status   08/27/2024 5.49 3.40 - 10.80 10*3/mm3 Final   08/26/2024 5.12 3.40 - 10.80 10*3/mm3 Final   08/25/2024 11.25 (H) 3.40 - 10.80 10*3/mm3 Final      HGB Hemoglobin   Date Value Ref Range Status   08/27/2024 10.8 (L) 13.0 - 17.7 g/dL Final   08/26/2024 10.5 (L) 13.0 - 17.7 g/dL Final   08/25/2024 11.5 (L) 13.0 - 17.7 g/dL Final      HCT Hematocrit   Date Value Ref Range Status   08/27/2024 33.5 (L) 37.5 - 51.0 % Final   08/26/2024 32.1 (L) 37.5 - 51.0 % Final   08/25/2024 35.1 (L) 37.5 - 51.0 % Final      Platelets Platelets   Date Value Ref Range Status   08/27/2024 197 140 - 450 10*3/mm3 Final   08/26/2024 171 140 - 450 10*3/mm3 Final   08/25/2024 144 140 - 450 10*3/mm3 Final        PT/INR:  No results found for: \"PROTIME\"/No results found for: \"INR\"    Sodium Sodium   Date Value Ref Range Status   08/27/2024 139 136 - 145 mmol/L Final   08/26/2024 134 (L) 136 - 145 mmol/L Final   08/25/2024 132 (L) 136 - 145 mmol/L Final      Potassium Potassium   Date Value Ref Range Status   08/27/2024 4.2 3.5 - 5.2 mmol/L " Final   08/26/2024 4.0 3.5 - 5.2 mmol/L Final   08/26/2024 3.4 (L) 3.5 - 5.2 mmol/L Final   08/25/2024 4.1 3.5 - 5.2 mmol/L Final      Chloride Chloride   Date Value Ref Range Status   08/27/2024 104 98 - 107 mmol/L Final   08/26/2024 99 98 - 107 mmol/L Final   08/25/2024 97 (L) 98 - 107 mmol/L Final      Bicarbonate CO2   Date Value Ref Range Status   08/27/2024 26.2 22.0 - 29.0 mmol/L Final   08/26/2024 26.4 22.0 - 29.0 mmol/L Final   08/25/2024 26.4 22.0 - 29.0 mmol/L Final      BUN BUN   Date Value Ref Range Status   08/27/2024 21 (H) 6 - 20 mg/dL Final   08/26/2024 28 (H) 6 - 20 mg/dL Final   08/25/2024 20 6 - 20 mg/dL Final      Creatinine Creatinine   Date Value Ref Range Status   08/27/2024 0.88 0.76 - 1.27 mg/dL Final   08/26/2024 1.02 0.76 - 1.27 mg/dL Final   08/25/2024 0.94 0.76 - 1.27 mg/dL Final      Calcium Calcium   Date Value Ref Range Status   08/27/2024 8.4 (L) 8.6 - 10.5 mg/dL Final   08/26/2024 8.4 (L) 8.6 - 10.5 mg/dL Final   08/25/2024 8.6 8.6 - 10.5 mg/dL Final      Magnesium Magnesium   Date Value Ref Range Status   08/27/2024 2.7 (H) 1.6 - 2.6 mg/dL Final   08/26/2024 2.7 (H) 1.6 - 2.6 mg/dL Final        amiodarone, 200 mg, Oral, Q24H  aspirin, 81 mg, Oral, Daily  atorvastatin, 40 mg, Oral, Nightly  chlorhexidine, 15 mL, Mouth/Throat, Q12H  colchicine, 0.6 mg, Oral, Q12H  enoxaparin, 40 mg, Subcutaneous, Daily  gabapentin, 100 mg, Oral, BID  insulin lispro, 2-7 Units, Subcutaneous, 4x Daily AC & at Bedtime  metoprolol tartrate, 12.5 mg, Oral, Q12H  pantoprazole, 40 mg, Oral, Q AM  senna-docusate sodium, 2 tablet, Oral, BID  tamsulosin, 0.4 mg, Oral, Daily             Aneurysm of ascending aorta    Nonrheumatic aortic valve insufficiency    Thoracic aortic aneurysm without rupture      Assessment & Plan    - Bicuspid aortopathy/ ascending aortic aneurysm, 5 cm/ mild to mod aortic insufficiency, EF 60-65% (cath)--s/p ascending aortic aneurysm repair/ aortic valve repair/ JUSTIN clipping (Pagni),    - HTN  - HLD--statin  - Postop leukocytosis, reactive--resolved  - Postop ABLA, expected--watch closely, stable  - Postop ST elevation c/w pericarditis-- colchicine  - BPH-- home flomax     POD# 5.    Brief episode of A-fib with RVR overnight ~15 minutes.  Converted back to sinus rhythm before IV Amio started.  BP unlikely to tolerate increased beta-blocker, will start p.o. amio.  Magnesium and potassium are normal.  On colchicine for pericarditis.  Mobilize and encourage pulm toilet.  Monitor rhythm and BP.  Possibly home later today or tomorrow.    Alex Bailon PA-C  24  10:11 EDT      Electronically signed by Lindsey Luna MD at 24 1430       Brittanie Lopez MD at 24 1034          [facility] CARDIOLOGY  [facility]        LOS:  LOS: 4 days   Patient Name: Sami Quiroga  Age/Sex: 59 y.o. male  : 1965  MRN: [id number]    Day of Service: 24   Length of Stay: 4  Encounter Provider: STEPHAN Merida  Place of Service: [facility] CARDIOLOGY  Patient Care Team:  Renard Mcintosh MD as PCP - General      Cardiology assessment and plan    Ascending aortic aneurysm with a bicuspid aortic valve status post unresolve repair and aortic valve repair with left atrial appendage closure  Normal coronaries  Normal LV systolic function  Hypertension  Hyperlipidemia  Postop pericarditis  Patient is currently in sinus rhythm and hemodynamically stable  Likely discharge home today  Denies any new cardiac symptoms  Postop day 4 cardiac valve repair and ascending aortic replacement surgery  Tmax is 97.8 pulse is 94 respirations are 16 blood pressure is 85/52 105/60 sats are 94%  Sodium is 134 potassium is 3.4 creatinine is 1.0 magnesium is 2.7 hemoglobin is 10.5  Twelve-lead EKG shows normal sinus rhythm with ST-T changes that are consistent with pericarditis  Current medications include colchicine 0.6 mg  p.o. twice daily patient is on potassium replacement therapy and  Patient is on aspirin 81 mg p.o. once a day Lipitor 40 mg p.o. once a day metoprolol 12.5 mg p.o. twice daily patient is currently on Lovenox for DVT prophylaxis  Stable from cardiac standpoint  Follow-up in office in 2 weeks after discharge              Subjective:     Chief Complaint:  F/U VHD    Subjective:   Patient denies SOA.  He has ambulated in the hallway without difficulty.    Current Medications:   Scheduled Meds:aspirin, 81 mg, Oral, Daily  atorvastatin, 40 mg, Oral, Nightly  chlorhexidine, 15 mL, Mouth/Throat, Q12H  colchicine, 0.6 mg, Oral, Q12H  enoxaparin, 40 mg, Subcutaneous, Daily  gabapentin, 100 mg, Oral, BID  insulin lispro, 2-7 Units, Subcutaneous, 4x Daily AC & at Bedtime  metoprolol tartrate, 12.5 mg, Oral, Q12H  mupirocin, , Each Nare, BID  pantoprazole, 40 mg, Oral, Q AM  potassium chloride ER, 40 mEq, Oral, Q4H  senna-docusate sodium, 2 tablet, Oral, BID  tamsulosin, 0.4 mg, Oral, Daily      Continuous Infusions:     Allergies:  No Known Allergies    Review of Systems   Constitutional: Negative for chills, decreased appetite and malaise/fatigue.   HENT:  Negative for congestion and nosebleeds.    Eyes:  Negative for blurred vision and double vision.   Cardiovascular:  Negative for chest pain, dyspnea on exertion, irregular heartbeat, leg swelling, near-syncope, orthopnea and palpitations.   Respiratory:  Negative for cough and shortness of breath.    Hematologic/Lymphatic: Negative for adenopathy. Does not bruise/bleed easily.   Skin:  Negative for color change and rash.   Musculoskeletal:  Negative for back pain and joint pain.   Gastrointestinal:  Negative for bloating, abdominal pain, hematemesis and hematochezia.   Genitourinary:  Negative for flank pain and hematuria.   Neurological:  Negative for dizziness and focal weakness.   Psychiatric/Behavioral:  Negative for altered mental status. The patient does not have  insomnia.        Objective:     Temp:  [98.1 °F (36.7 °C)-98.7 °F (37.1 °C)] 98.7 °F (37.1 °C)  Heart Rate:  [86-97] 94  Resp:  [16] 16  BP: ()/(53-65) 105/61     Intake/Output Summary (Last 24 hours) at 8/26/2024 1034  Last data filed at 8/26/2024 0830  Gross per 24 hour   Intake 480 ml   Output 30 ml   Net 450 ml     Body mass index is 28.6 kg/m².      08/24/24  0600 08/25/24  0539 08/26/24  0600   Weight: 94.3 kg (207 lb 12.8 oz) 93.8 kg (206 lb 14.4 oz) 93 kg (205 lb)         Physical Exam:  Neuro:  CV:  Resp:  GI:  Ext:  Tele: AAOx3, no gross deficits  S1S2 RRR, no murmur  Nonlabored, CTA  BS+, abd soft  Pedal pulses palp, no edema  SR                                                   Lab Review:   Results from last 7 days   Lab Units 08/26/24  0607 08/25/24  0432 08/22/24  1112 08/21/24  0744   SODIUM mmol/L 134* 132*   < > 141   POTASSIUM mmol/L 3.4* 4.1   < > 4.1   CHLORIDE mmol/L 99 97*   < > 102   CO2 mmol/L 26.4 26.4   < > 26.6   BUN mg/dL 28* 20   < > 15   CREATININE mg/dL 1.02 0.94   < > 0.98   GLUCOSE mg/dL 112* 122*   < > 132*   CALCIUM mg/dL 8.4* 8.6   < > 9.5   AST (SGOT) U/L  --   --   --  32   ALT (SGPT) U/L  --   --   --  42*    < > = values in this interval not displayed.     Results from last 7 days   Lab Units 08/23/24  0910   HSTROP T ng/L 215*     Results from last 7 days   Lab Units 08/26/24  0607 08/25/24  0432   WBC 10*3/mm3 5.12 11.25*   HEMOGLOBIN g/dL 10.5* 11.5*   HEMATOCRIT % 32.1* 35.1*   PLATELETS 10*3/mm3 171 144     Results from last 7 days   Lab Units 08/23/24  0306 08/22/24  1112 08/22/24  0942   INR  1.05 1.11* 1.34*   APTT seconds  --  22.9* 21.1*     Results from last 7 days   Lab Units 08/26/24  0607 08/23/24  0306   MAGNESIUM mg/dL 2.7* 2.9*     Results from last 7 days   Lab Units 08/21/24  0744   CHOLESTEROL mg/dL 130   TRIGLYCERIDES mg/dL 154*   HDL CHOL mg/dL 36*               Recent Radiology:  Imaging Results (Most Recent)       Procedure Component Value Units  Date/Time    XR Chest 1 View [[id number]] Collected: 08/25/24 1705     Updated: 08/25/24 1708    Narrative:      XR CHEST 1 VW    Date of Exam: 8/25/2024 3:58 PM EDT    Indication: s/p right mediastinal ct removal    Comparison: 8/24/2024 1056 hours    Findings:  The mediastinal chest tube has been removed. There is improved aeration throughout the lungs bilaterally. There is residual atelectasis in the lung bases. Small bilateral pleural effusions are seen. The cardiac silhouette and mediastinum are stable.   Postoperative changes are noted. The right IJ venous sheath has been removed.      Impression:      Impression:  1.Interval removal of the mediastinal chest tube and right IJ venous sheath.  2.Improved aeration throughout the lungs bilaterally.  3.Residual atelectasis is noted in the lung bases. Small bilateral pleural effusions are noted.      Electronically Signed: Osmel Heredia MD    8/25/2024 5:06 PM EDT    Workstation ID: [id number]    XR Chest 1 View [[id number]] Collected: 08/24/24 1139     Updated: 08/24/24 1146    Narrative:      XR CHEST 1 VW    Date of Exam: 8/24/2024 11:08 AM EDT    Indication: s/p ct removal    Comparison: Chest radiograph 8/24/2024    Findings:    Stable appearing right IJ sheath. Left-sided mediastinal drain has been removed. Right-sided mediastinal drain appears in stable position.    Mediastinum: Cardiomediastinal silhouette appears unchanged including postoperative changes and accentuation secondary to low lung volumes.    Lungs: Low lung volumes with hypoventilatory change. Similar pulmonary vascular congestion and bibasal opacities accounting for differences in lung volume.    Pleura: Small bilateral pleural effusions. No visible pneumothorax.    Bones and soft tissues: No acute osseous abnormality.        Impression:      Impression:  Interval removal of left-sided mediastinal drain. Otherwise stable lines and tubes.    Similar pulmonary vascular congestion with bibasal  opacities and small bilateral pleural effusions accounting for differences in lung volume.      Electronically Signed: Alvaro Dewey    8/24/2024 11:44 AM EDT    Workstation ID: [id number]    XR Chest 1 View [[id number]] Collected: 08/24/24 0735     Updated: 08/24/24 0738    Narrative:      XR CHEST 1 VW    Date of Exam: 8/24/2024 5:20 AM EDT    Indication: Post-Op Heart Surgery    Comparison: 8/23/2024    Findings:  Leonard-Alan catheter is been removed. Stable right IJ vascular sheath. Postsurgical changes of sternotomy and CABG again noted. Left atrial appendage clip. Stable cardiomegaly. Increased central pulmonary vascular congestion. Negative for pneumothorax.   Worsening bibasilar airspace opacities with small bilateral pleural effusions. Negative for pneumothorax. Stable midline chest tube.      Impression:      Impression:  1. Removal of Leonard-Alan catheter with stable right IJ vascular sheath.  2. Stable midline chest tube. No pneumothorax.  3. Cardiomegaly with increased central pulmonary vascular congestion.  4. Worsening bibasilar airspace opacities which may relate to atelectasis with small bilateral pleural effusions.        Electronically Signed: Wil Rodriguez MD    8/24/2024 7:36 AM EDT    Workstation ID: [id number]    XR Chest 1 View [[id number]] Collected: 08/23/24 0713     Updated: 08/23/24 0716    Narrative:      XR CHEST 1 VW    Date of Exam: 8/23/2024 5:55 AM EDT    Indication: Post-Op Heart Surgery    Comparison: 8/22/2024    Findings:  Interval extubation and removal of esophagogastric tube. Right IJ Leonard-Alan catheter stable in expected position. Postsurgical changes of sternotomy and CABG. Left atrial appendage clip. Lungs are hypoinflated with bibasilar airspace opacities left   greater than right likely atelectasis. No significant effusion. Negative for pneumothorax. Stable chest tubes overlying the midline.      Impression:      Impression:  1. Interval extubation and removal of esophagogastric  tube.  2. Stable right IJ [place]-Alan catheter.  3. Hypoinflated lungs with bibasilar airspace disease left greater than right likely atelectasis.  4. No pneumothorax.        Electronically Signed: Wil Rodriguez MD    8/23/2024 7:14 AM EDT    Workstation ID: [id number]    XR Chest 1 View [[id number]] Collected: 08/22/24 1140     Updated: 08/22/24 1144    Narrative:      XR CHEST 1 VW    Date of Exam: 8/22/2024 11:39 AM EDT    Indication: Post-Op Check Line & Tube Placement    Comparison: August 21, 2024    Findings:  ET tube tip is at level of the clavicles. There is a [place]-Alan catheter with tip in the pulmonary outflow tract. There is a nasogastric tube noted with its tip at least within the stomach. There is left atrial appendage clip. There is some atelectasis or   scarring in the right lower chest. There is slight prominence of markings in the left perihilar region which could reflect shadows from pulmonary vessels accentuated secondary to technique and depth of inspiration. Some atelectasis in this area not   excluded. There is no definite pneumothorax.      Impression:      Impression:  1.Lines and tubes as noted.  2.Atelectatic change right lower chest. There is some prominence of markings left infrahilar area that might reflect some atelectasis in this area.      Electronically Signed: Rodrigo Bhatia MD    8/22/2024 11:42 AM EDT    Workstation ID: [id number]            ECHOCARDIOGRAM:    Results for orders placed in visit on 08/22/24    Intra-Op Anesthesia [facility]    Narrative  Intra-Op Anesthesia CHACE    Procedure Performed: Intra-Op Anesthesia CHACE  Start Time:  End Time:    Preanesthesia Checklist:  Patient identified, IV assessed, risks and benefits discussed, monitors and equipment assessed, procedure being performed at surgeon's request and anesthesia consent obtained.    General Procedure Information  Diagnostic Indications for Echo:  assessment of surgical repair and hemodynamic monitoring  Location performed:   OR  Intubated  Bite block placed  Heart visualized  Probe Insertion:  Easy  Probe Type:  Biplane and multiplane  Modalities:  Color flow mapping, pulse wave Doppler and continuous wave Doppler    Echocardiographic and Doppler Measurements    Ventricles    Right Ventricle:  Cavity size normal.  Hypertrophy not present.  Thrombus not present.  Global function normal.  Ejection Fraction 60%.  Left Ventricle:  Cavity size normal.  Thrombus not present.  Global Function normal.  Ejection Fraction 60%.    Ventricular Regional Function:  1- Basal Anteroseptal:  normal  2- Basal Anterior:  normal  3- Basal Anterolateral:  normal  4- Basal Inferolateral:  normal  5- Basal Inferior:  normal  6- Basal Inferoseptal:  normal  7- Mid Anteroseptal:  normal  8- Mid Anterior:  normal  9- Mid Anterolateral:  normal  10- Mid Inferolateral:  normal  11- Mid Inferior:  normal  12- Mid Inferoseptal:  normal  13- Apical Anterior:  normal  14- Apical Lateral:  normal  15- Apical Inferior:  normal  16- Apical Septal:  normal  17- [place]:  normal      Valves    Aortic Valve:  Annulus normal.  Stenosis mild.  Regurgitation absent.  Leaflets bicuspid.  Leaflet motions restricted.    Mitral Valve:  Annulus normal.  Stenosis not present.  Regurgitation absent.  Leaflets normal.  Leaflet motions normal.    Tricuspid Valve:  Annulus normal.  Stenosis not present.  Regurgitation absent.  Leaflets normal.  Leaflet motions normal.  Pulmonic Valve:  Annulus normal.  Stenosis not present.  Regurgitation absent.      Aorta    Ascending Aorta:  Size normal.  Dissection not present.  Plaque thickness less than 3 mm.  Mobile plaque not present.  Aortic Arch:  Size normal.  Dissection not present.  Plaque thickness less than 3 mm.  Mobile plaque not present.  Descending Aorta:  Size normal.  Dissection not present.  Plaque thickness less than 3 mm.  Mobile plaque not present.      Atria    Right Atrium:  Size normal.  Left Atrium:  Size dilated.  Spontaneous  echo contrast not present.  Thrombus not present.  Tumor not present.  Device not present.  Left atrial appendage normal.      Septa    Atrial Septum:  Intra-atrial septal morphology contains patent foramen ovale.  Patent foramen ovale shunts left to right.    Ventricular Septum:  Intra-ventricular septum morphology normal.        Other Findings  Pericardium:  normal  Pleural Effusion:  none  Pulmonary Arteries:  normal      Anesthesia Information  Performed Personally  Anesthesiologist:  Jethro Swan MD      Echocardiogram Comments:  CHACE placed without resistance, lubricated, bite guard      Pre cpb CHACE  LV no WMA EF 60  RV nl SF  MV wnl  TV wnl  RA dilated  AV bicuspid mild eccentric AI (toward AML) . Dilated aortic root 3.9 CM STJ 4cm asc aorta 4.4 cm taper to nl in distal arch      Post cpb AV repair, asc aorta replace  LV RV no change  MV TV no change  AV bicuspid repair, no AI        I reviewed the patient's new clinical results.    EKG:      Assessment:       Aneurysm of ascending aorta    Nonrheumatic aortic valve insufficiency    Thoracic aortic aneurysm without rupture    1) Ascending Aortic Aneurysm with Bicuspid AV s/p aneurysm repair and AV repair with JUSTIN closure 8/22  - preop cath 6/2024 showed normal coronaries  - periop CHACE showed EF = 60%    2) HTN    3) HLD     4) hypokalemia  - K 3.4, Mg 2.7  - repleted    5) postop pericarditis  - on colchicine and toradol    Plan:   Tele shows SR.  Repeat EKG to follow-up on post-op pericarditis.  Hopefully home soon.  F/U with Dr. Lopez in 2 weeks on discharge.          Electronically signed by STEPHAN Merida, 08/26/24, 10:45 AM EDT.     Electronically signed by Brittanie Lopez MD at 08/26/24 0540       Alex Bailon PA-C at 08/26/24 8536           LOS: 4 days   Patient Care Team:  Renard Mcintosh MD as PCP - General    Chief Complaint:   Post-op follow-up, s/p ascending aneurysm repair, AV repair    Subjective  Sitting up  "in chair. Pain controlled on current regimen. No new complaints. Discussed possibly going home today or tomorrow.     Vital Signs  Temp:  [98.1 °F (36.7 °C)-98.7 °F (37.1 °C)] 98.7 °F (37.1 °C)  Heart Rate:  [86-97] 94  Resp:  [16] 16  BP: ()/(42-65) 105/61      08/24/24  0600 08/25/24  0539 08/26/24  0600   Weight: 94.3 kg (207 lb 12.8 oz) 93.8 kg (206 lb 14.4 oz) 93 kg (205 lb)     Body mass index is 28.6 kg/m².    Intake/Output Summary (Last 24 hours) at 8/26/2024 0953  Last data filed at 8/26/2024 0830  Gross per 24 hour   Intake 480 ml   Output 30 ml   Net 450 ml     I/O this shift:  In: 240 [P.O.:240]  Out: -       Objective:  Vital signs: (most recent): Blood pressure 105/61, pulse 94, temperature 98.7 °F (37.1 °C), temperature source Oral, resp. rate 16, height 180.3 cm (70.98\"), weight 93 kg (205 lb), SpO2 95%.                Physical Exam:   General Appearance: awake and alert, no acute distress   Lungs: clear to auscultation, respirations regular, and respirations unlabored   Heart: regular rhythm & normal rate and normal S1, S2   Abdomen: soft or nontender, + bowel sounds, +BM   Skin: sternal incision clean, dry, intact   Neuro: alert and oriented, no focal deficits.     Results Review:      WBC WBC   Date Value Ref Range Status   08/26/2024 5.12 3.40 - 10.80 10*3/mm3 Final   08/25/2024 11.25 (H) 3.40 - 10.80 10*3/mm3 Final   08/24/2024 15.76 (H) 3.40 - 10.80 10*3/mm3 Final      HGB Hemoglobin   Date Value Ref Range Status   08/26/2024 10.5 (L) 13.0 - 17.7 g/dL Final   08/25/2024 11.5 (L) 13.0 - 17.7 g/dL Final   08/24/2024 11.7 (L) 13.0 - 17.7 g/dL Final      HCT Hematocrit   Date Value Ref Range Status   08/26/2024 32.1 (L) 37.5 - 51.0 % Final   08/25/2024 35.1 (L) 37.5 - 51.0 % Final   08/24/2024 37.0 (L) 37.5 - 51.0 % Final      Platelets Platelets   Date Value Ref Range Status   08/26/2024 171 140 - 450 10*3/mm3 Final   08/25/2024 144 140 - 450 10*3/mm3 Final   08/24/2024 149 140 - 450 " "10*3/mm3 Final        PT/INR:  No results found for: \"PROTIME\"/No results found for: \"INR\"    Sodium Sodium   Date Value Ref Range Status   08/26/2024 134 (L) 136 - 145 mmol/L Final   08/25/2024 132 (L) 136 - 145 mmol/L Final   08/24/2024 132 (L) 136 - 145 mmol/L Final      Potassium Potassium   Date Value Ref Range Status   08/26/2024 3.4 (L) 3.5 - 5.2 mmol/L Final   08/25/2024 4.1 3.5 - 5.2 mmol/L Final   08/24/2024 4.3 3.5 - 5.2 mmol/L Final      Chloride Chloride   Date Value Ref Range Status   08/26/2024 99 98 - 107 mmol/L Final   08/25/2024 97 (L) 98 - 107 mmol/L Final   08/24/2024 98 98 - 107 mmol/L Final      Bicarbonate CO2   Date Value Ref Range Status   08/26/2024 26.4 22.0 - 29.0 mmol/L Final   08/25/2024 26.4 22.0 - 29.0 mmol/L Final   08/24/2024 23.9 22.0 - 29.0 mmol/L Final      BUN BUN   Date Value Ref Range Status   08/26/2024 28 (H) 6 - 20 mg/dL Final   08/25/2024 20 6 - 20 mg/dL Final   08/24/2024 17 6 - 20 mg/dL Final      Creatinine Creatinine   Date Value Ref Range Status   08/26/2024 1.02 0.76 - 1.27 mg/dL Final   08/25/2024 0.94 0.76 - 1.27 mg/dL Final   08/24/2024 0.96 0.76 - 1.27 mg/dL Final      Calcium Calcium   Date Value Ref Range Status   08/26/2024 8.4 (L) 8.6 - 10.5 mg/dL Final   08/25/2024 8.6 8.6 - 10.5 mg/dL Final   08/24/2024 8.6 8.6 - 10.5 mg/dL Final      Magnesium Magnesium   Date Value Ref Range Status   08/26/2024 2.7 (H) 1.6 - 2.6 mg/dL Final        aspirin, 81 mg, Oral, Daily  atorvastatin, 40 mg, Oral, Nightly  chlorhexidine, 15 mL, Mouth/Throat, Q12H  colchicine, 0.6 mg, Oral, Q12H  enoxaparin, 40 mg, Subcutaneous, Daily  furosemide, 20 mg, Oral, Daily  gabapentin, 100 mg, Oral, BID  insulin lispro, 2-7 Units, Subcutaneous, 4x Daily AC & at Bedtime  metoprolol tartrate, 12.5 mg, Oral, Q12H  mupirocin, , Each Nare, BID  pantoprazole, 40 mg, Oral, Q AM  polyethylene glycol, 17 g, Oral, BID  potassium chloride ER, 40 mEq, Oral, Q4H  senna-docusate sodium, 2 tablet, Oral, " BID  tamsulosin, 0.4 mg, Oral, Daily             Aneurysm of ascending aorta    Nonrheumatic aortic valve insufficiency    Thoracic aortic aneurysm without rupture      Assessment & Plan    - Bicuspid aortopathy/ ascending aortic aneurysm, 5 cm/ mild to mod aortic insufficiency, EF 60-65% (cath)--s/p ascending aortic aneurysm repair/ aortic valve repair/ JUSTIN clipping (Pagni),   - HTN  - HLD--statin  - Postop leukocytosis, reactive--resolved  - Postop ABLA, expected--watch closely, stable  - Postop ST elevation c/w pericarditis-- colchicine  - BPH-- home flomax     POD# 4.      Doing well. Continue routine care.  BP borderline, hold diuretics.  Replete K.  Colchicine and toradol for pericarditis.   Mobilize and encourage pulm toilet.  Possibly home later today or tomorrow.       Alex Bailon PA-C  08/26/24  09:53 EDT      Electronically signed by Alex Bailon PA-C at 08/26/24 1003       Consult Notes (last 48 hours)  Notes from 08/26/24 0922 through 08/28/24 0922   No notes of this type exist for this encounter.          Discharge Summary        Alex Bailon PA-C at 08/27/24 1433          [facility] Cardiac Surgery Discharge Summary    Date of Admission: 8/22/2024  Date of Discharge:  8/27/2024    Discharge Diagnosis:   - Bicuspid aortopathy/ ascending aortic aneurysm, 5 cm/ mild to mod aortic insufficiency, EF 60-65% (cath)--s/p ascending aortic aneurysm repair/ aortic valve repair/ JUSTIN clipping (Pagni),   - HTN  - HLD--statin  - Postop leukocytosis, reactive--resolved  - Postop ABLA, expected--watch closely, stable  - Postop ST elevation c/w pericarditis-- tx with colchicine  - BPH-- home flomax    Presenting Problem/History of Present Illness  Nonrheumatic aortic valve insufficiency [I35.1]  Aneurysm of ascending aorta without rupture [I71.21]  Aneurysm of ascending aorta [I71.21]     Hospital Course  Patient is a 59 y.o. male presented with above medical history.  After having appropriate preoperative  workup he was scheduled for surgery.  He was admitted the morning of the procedure.  On 822/24 he underwent elective ascending aortic aneurysm repair (30 mm Gelweave dacryon interposition graft), aortic valve repair with plication of conjoined leaflet, and left atrial appendage closure (35mm AtriClip).  He tolerated the procedure was transferred to open-heart recovery.  Full details can be found in operative report.  He remained stable and was able to be extubated shortly after surgery on postop day 0.  He remained stable overnight and was able to be de-escalated on postop day 1.  He did have ST elevation consistent with pericarditis and was started on colchicine.  On POD 2 chest tubes, central line, and [place] catheter were removed without issue.  On POD 3 epicardial wires were removed.  He continued to progress with increased activity, diuresis, and weaning of supplemental oxygen.  Did have intermittent borderline hypotension that was asymptomatic but made it difficult to titrate beta-blocker.  Discussed with Dr. Alex and patient was started on low-dose midodrine.  Overnight on POD4 he had a very brief episode of atrial fibrillation with RVR that lasted less than 15 minutes.  He converted to sinus rhythm before IV amiodarone was even started.  The following morning he was started on 200 mg p.o. amnio daily remains in sinus rhythm.  On postop day 5 he was ready for discharge.  He is completed almost 5 days of colchicine and will discontinue due to the interaction with amiodarone.  Might be able to discontinue Amio and/or midodrine in the next week or 2 when he follows up with cardiology.  Plan discharge home with family.  At time of discharge he was hemodynamically stable, ambulating without issue, and on room air.Patient was provided with appropriate discharge education. He was instructed to call office with any questions or concerns.      Procedures Performed  Procedure(s):  ASCENDING AORTIC ARCH replaced with  30mm gelweave graft  [id number], left atrial appendage exclusion with 35mm atriclip,  with intraoperative CHACE    Operative Note - Dr. Alex     Date of Dictation: 08/24/24     Date of Procedure: 08/22/2020     Referring Physician: Carroll Lopez MD     Preoperative diagnosis:  1.  5 cm ascending aortic aneurysm with ascending to descending aorta index greater than 2  2.  Mild to moderate aortic insufficiency with an eccentric jet  3.  Bicuspid aortopathy     Postoperative diagnosis:  Same     Procedure:   1.  Elective ascending aortic aneurysm repair with a 30 mm Gelweave Dacron interposition graft  2.  Aortic valve repair with plication of the conjoined leaflet, release of the raphae of the same leaflet and narrowing of the sinotubular junction symmetrically to 30 mm  3.  Left atrial appendage epicardial closure with a 35 mm atrial clip device       Consults:   Consults       Date and Time Order Name Status Description    8/22/2024 11:10 AM Inpatient Cardiology Consult Completed         Cardiology-- Dr. Mcdaniel    Pertinent Test Results:    Lab Results   Component Value Date    WBC 5.49 08/27/2024    HGB 10.8 (L) 08/27/2024    HCT 33.5 (L) 08/27/2024    MCV 89.1 08/27/2024     08/27/2024      Lab Results   Component Value Date    GLUCOSE 107 (H) 08/27/2024    CALCIUM 8.4 (L) 08/27/2024     08/27/2024    K 4.2 08/27/2024    CO2 26.2 08/27/2024     08/27/2024    BUN 21 (H) 08/27/2024    CREATININE 0.88 08/27/2024    EGFRIFAFRI 97 10/05/2020    EGFRIFNONA 84 10/05/2020    BCR 23.9 08/27/2024    ANIONGAP 8.8 08/27/2024     Lab Results   Component Value Date    INR 1.05 08/23/2024    PROTIME 11.4 08/23/2024         Condition on Discharge:    stable    Vital Signs  Temp:  [98.8 °F (37.1 °C)-99.2 °F (37.3 °C)] 99.2 °F (37.3 °C)  Heart Rate:  [] 91  Resp:  [18-29] 29  BP: ()/(51-78) 98/64      Discharge Disposition  Home or Self Care    Discharge Medications     Discharge Medications         New Medications        Instructions Start Date   amiodarone 200 MG tablet  Commonly known as: PACERONE   200 mg, Oral, Every 24 Hours Scheduled   Start Date: August 28, 2024     Aspirin Low Dose 81 MG EC tablet  Generic drug: aspirin   81 mg, Oral, Daily   Start Date: August 28, 2024     HYDROcodone-acetaminophen 5-325 MG per tablet  Commonly known as: NORCO   1 tablet, Oral, Every 6 Hours PRN      metoprolol tartrate 25 MG tablet  Commonly known as: LOPRESSOR   12.5 mg, Oral, Every 12 Hours Scheduled      midodrine 5 MG tablet  Commonly known as: PROAMATINE   5 mg, Oral, 3 Times Daily Before Meals             Continue These Medications        Instructions Start Date   atorvastatin 20 MG tablet  Commonly known as: LIPITOR   20 mg, Oral, Every Night at Bedtime      tamsulosin 0.4 MG capsule 24 hr capsule  Commonly known as: FLOMAX   1 capsule, Oral, Daily             Stop These Medications      lisinopril 5 MG tablet  Commonly known as: PRINIVIL,ZESTRIL     metoprolol succinate XL 25 MG 24 hr tablet  Commonly known as: TOPROL-XL     mupirocin 2 % ointment  Commonly known as: BACTROBAN              Discharge Diet:   Heart healthy    Activity at Discharge:   1. No driving for 2 weeks and off narcotic pain medications.  2. Shower daily. Clean incisions with warm water and antibacterial soap only. Do not put any lotion or ointments on incisions.  3. Ambulate for 10 minutes at least 3 times a day.  4. No heavy lifting > 10lbs until seen in office.   5. Take all medications as prescribed.     Follow-up Appointments:  Future Appointments   Date Time Provider Department Center   9/12/2024  1:00 PM Yoana Vargas APRN [facility]     Additional Instructions for the Follow-ups that You Need to Schedule       Call MD With Problems / Concerns   As directed      Instructions:  Call office at [phone] for any drainage, increased redness, or fever over 100.5    Order Comments: Instructions:  Call office  at [phone] for any drainage, increased redness, or fever over 100.5         Discharge Follow-up with PCP   As directed       Currently Documented PCP:    Renard Mcintosh MD    PCP Phone Number:    [phone]     Follow Up Details: in 1 week        Discharge Follow-up with Specialty: Cardiologist STEPHAN/PA; 1 Week   As directed      Specialty: Cardiologist APRN/PA   Follow Up: 1 Week   Follow Up Details: bring all prescription bottles to appointment, call for appointment        Discharge Follow-up with Specified Provider: Cardiologist; 1 Month   As directed      To: Cardiologist   Follow Up: 1 Month   Follow Up Details: call for appointment, bring all medication bottles to appointment        Discharge Follow-up with Specified Provider: cardiac surgery office   As directed      To: cardiac surgery office   Follow Up Details: 4-6 weeks, bring all current medications to appointment                STS info: on ASA, statin, BB    Test Results Pending at Discharge:  None         Alex Bailon PA-C  08/27/24  15:21 EDT                  Electronically signed by Alex Bailon PA-C at 08/27/24 2879

## 2024-08-28 NOTE — OUTREACH NOTE
Call Center TCM Note      Flowsheet Row Responses   [facility] facility patient discharged from? [facility]   Does the patient have one of the following disease processes/diagnoses(primary or secondary)? Cardiothoracic surgery   TCM attempt successful? No   Unsuccessful attempts Attempt 1            Haley Brewster MA    8/28/2024, 15:12 EDT

## 2024-08-28 NOTE — CASE MANAGEMENT/SOCIAL WORK
Case Management Discharge Note      Final Note: Routine Home.    Selected Continued Care - Discharged on 8/27/2024 Admission date: 8/22/2024 - Discharge disposition: Home or Self Care          Transportation Services  Private: Car    Final Discharge Disposition Code: 01 - home or self-care

## 2024-08-28 NOTE — OUTREACH NOTE
Call Center TCM Note      Flowsheet Row Responses   [facility] facility patient discharged from? [facility]   Does the patient have one of the following disease processes/diagnoses(primary or secondary)? Cardiothoracic surgery   TCM attempt successful? No   Unsuccessful attempts Attempt 2            Haley Brewster MA    8/28/2024, 16:17 EDT

## 2024-08-29 ENCOUNTER — TRANSITIONAL CARE MANAGEMENT TELEPHONE ENCOUNTER (OUTPATIENT)
Dept: CALL CENTER | Facility: HOSPITAL | Age: 59
End: 2024-08-29
Payer: COMMERCIAL

## 2024-08-29 NOTE — OUTREACH NOTE
Call Center TCM Note      Flowsheet Row Responses   [facility] facility patient discharged from? [facility]   Does the patient have one of the following disease processes/diagnoses(primary or secondary)? Cardiothoracic surgery   TCM attempt successful? No   Unsuccessful attempts Attempt 3            June Stratton RN    8/29/2024, 12:30 EDT

## 2024-08-30 LAB
QT INTERVAL: 305 MS
QTC INTERVAL: 373 MS

## 2024-09-04 ENCOUNTER — OFFICE VISIT (OUTPATIENT)
Dept: CARDIOLOGY | Facility: CLINIC | Age: 59
End: 2024-09-04
Payer: COMMERCIAL

## 2024-09-04 VITALS
SYSTOLIC BLOOD PRESSURE: 119 MMHG | WEIGHT: 193 LBS | HEIGHT: 71 IN | DIASTOLIC BLOOD PRESSURE: 75 MMHG | HEART RATE: 80 BPM | BODY MASS INDEX: 27.02 KG/M2 | OXYGEN SATURATION: 95 %

## 2024-09-04 DIAGNOSIS — I97.89 POSTOPERATIVE ATRIAL FIBRILLATION: ICD-10-CM

## 2024-09-04 DIAGNOSIS — I48.91 POSTOPERATIVE ATRIAL FIBRILLATION: ICD-10-CM

## 2024-09-04 DIAGNOSIS — Z87.74 H/O BICUSPID AORTIC VALVE: ICD-10-CM

## 2024-09-04 DIAGNOSIS — I35.1 NONRHEUMATIC AORTIC VALVE INSUFFICIENCY: ICD-10-CM

## 2024-09-04 DIAGNOSIS — I71.21 ANEURYSM OF ASCENDING AORTA WITHOUT RUPTURE: Primary | ICD-10-CM

## 2024-09-04 DIAGNOSIS — R94.31 PROLONGED Q-T INTERVAL ON ECG: ICD-10-CM

## 2024-09-04 NOTE — PROGRESS NOTES
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    Patient has past cardiac history of thoracic ascending aortic aneurysm with bicuspid aortic valve status post recent aortic aneurysm repair, AV repair, and JUSTIN closure on 8/22/2024 complicated by postop pericarditis s/p colchicine and very brief post op afib without recurrence and not started on anticoagulation.  Preop cath 6/2024 showed normal coronaries. Periop CHACE shows an EF = 60% with mild AI.    Patient has been able to do some walking of about 300-400 feet out to the mailbox, for example, with some exertional dyspnea.  He denies chest pain or palpitations.  He denies BLE edema.      ASSESSMENT/PLAN:      Diagnoses and all orders for this visit:    1. Aneurysm of ascending aorta without rupture (Primary)  -     Cardiac Rehab Evaluation and Enrollment; Future    2. H/O bicuspid aortic valve  -     Cardiac Rehab Evaluation and Enrollment; Future    3. Nonrheumatic aortic valve insufficiency  -     Cardiac Rehab Evaluation and Enrollment; Future    4. Postoperative atrial fibrillation  -     ECG 12 Lead    5. Prolonged Q-T interval on ECG            MEDICAL DECISION MAKING:    EKG shows sinus rhythm with prolonged QT.  Will discontinue amiodarone.  Will enroll patient in cardiac rehab in [place].  Discussed with patient regarding AV screening of his daughter when she reaches age 40.      RTC in 3 months.    Past Medical History:   Diagnosis Date    Aortic insufficiency due to bicuspid aortic valve     Ascending aortic aneurysm     Bicuspid aortic valve     Heart murmur     Hyperlipidemia     Hypertension        Past Surgical History:   Procedure Laterality Date    APPENDECTOMY      ASCENDING AORTIC ANEURYSM REPAIR N/A 8/22/2024     Procedure: ASCENDING AORTIC ARCH replaced with 30mm gelweave graft  [id number], left atrial appendage exclusion with 35mm atriclip,  with intraoperative CHACE;  Surgeon: Carlos Alex MD;  Location: [facility] CVOR;  Service: Cardiothoracic;  Laterality: N/A;    CARDIAC CATHETERIZATION Right 2024    Procedure: Left Heart Cath, RADIAL;  Surgeon: Brittanie Lopez MD;  Location: [facility] CATH INVASIVE LOCATION;  Service: Cardiovascular;  Laterality: Right;  cath in 2024         Current Outpatient Medications:     aspirin 81 MG EC tablet, Take 1 tablet by mouth Daily., Disp: 30 tablet, Rfl: 2    atorvastatin (LIPITOR) 20 MG tablet, TAKE 1 TABLET BY MOUTH EVERY NIGHT AT BEDTIME, Disp: 90 tablet, Rfl: 1    metoprolol tartrate (LOPRESSOR) 25 MG tablet, Take 0.5 tablets by mouth Every 12 (Twelve) Hours., Disp: 30 tablet, Rfl: 2    midodrine (PROAMATINE) 5 MG tablet, Take 1 tablet by mouth 3 (Three) Times a Day Before Meals for 30 days., Disp: 90 tablet, Rfl: 0    tamsulosin (FLOMAX) 0.4 MG capsule 24 hr capsule, Take 1 capsule by mouth Daily., Disp: , Rfl:   No current facility-administered medications for this visit.    Facility-Administered Medications Ordered in Other Visits:     Chlorhexidine Gluconate Cloth 2 % pads, , Topical, Q12H PRN, Yoana Vargas, APRN    Social History     Socioeconomic History    Marital status:    Tobacco Use    Smoking status: Former     Current packs/day: 0.00     Average packs/day: 1 pack/day for 4.0 years (4.0 ttl pk-yrs)     Types: Cigarettes     Start date: 1986     Quit date: 1990     Years since quittin.7    Smokeless tobacco: Never   Vaping Use    Vaping status: Never Used   Substance and Sexual Activity    Alcohol use: Yes     Comment: Rare     Drug use: No    Sexual activity: Defer       Family History   Problem Relation Age of Onset    Hypertension Mother     Hypertension Father        The following portions of the patient's  "history were reviewed and updated as appropriate: allergies, current medications, past family history, past medical history, past social history, past surgical history and problem list.    ROS  /75   Pulse 80   Ht 180.3 cm (71\")   Wt 87.5 kg (193 lb)   SpO2 95%   BMI 26.92 kg/m² .  Objective     Physical Exam    Physical Exam:  Neuro:  CV:  Resp:  GI:  Ext:  Pysch: AAOx3, no gross deficits  S1S2 RRR, grade 2/6 systolic murmur  Non-labored, CTA  BS+, abd soft  Pedal pulses palp, no edema  Calm and cooperative       Procedures    EKG ordered by and reviewed by me in office  EKG shows sinus rhythm with minimal J-point elevation of the inferior/lateral leads and prolonged QT compared to prior EKG.       "

## 2024-09-05 ENCOUNTER — OFFICE VISIT (OUTPATIENT)
Dept: FAMILY MEDICINE CLINIC | Facility: CLINIC | Age: 59
End: 2024-09-05
Payer: COMMERCIAL

## 2024-09-05 VITALS
RESPIRATION RATE: 18 BRPM | SYSTOLIC BLOOD PRESSURE: 132 MMHG | OXYGEN SATURATION: 97 % | DIASTOLIC BLOOD PRESSURE: 82 MMHG | WEIGHT: 192.4 LBS | HEIGHT: 71 IN | HEART RATE: 95 BPM | BODY MASS INDEX: 26.94 KG/M2 | TEMPERATURE: 97.3 F

## 2024-09-05 DIAGNOSIS — I77.810 AORTIC ROOT DILATION: ICD-10-CM

## 2024-09-05 DIAGNOSIS — Z87.891 FORMER SMOKER: ICD-10-CM

## 2024-09-05 DIAGNOSIS — E66.3 OVERWEIGHT WITH BODY MASS INDEX (BMI) OF 28 TO 28.9 IN ADULT: ICD-10-CM

## 2024-09-05 DIAGNOSIS — Z09 HOSPITAL DISCHARGE FOLLOW-UP: Primary | ICD-10-CM

## 2024-09-05 DIAGNOSIS — R20.0 ARM NUMBNESS LEFT: ICD-10-CM

## 2024-09-05 PROBLEM — I35.1 NONRHEUMATIC AORTIC VALVE INSUFFICIENCY: Status: RESOLVED | Noted: 2024-01-23 | Resolved: 2024-09-05

## 2024-09-05 PROCEDURE — 99495 TRANSJ CARE MGMT MOD F2F 14D: CPT | Performed by: FAMILY MEDICINE

## 2024-09-05 NOTE — PROGRESS NOTES
Subjective   Sami Quiroga is a 59 y.o. male.   Chief Complaint   Patient presents with    Hospital Follow Up Visit       History of Present Illness  Sami was seen at [facility] . He was admitted on 8/22/2024  for  elective ascending aortic aneurysm repair.   He was discharged on 8/27/2024. Discharge diagnosis was  Bicuspid aortopathy/ ascending aortic aneurysm, 5 cm/ mild to mod aortic insufficiency, EF 60-65% (cath)--s/p ascending aortic aneurysm repair/ aortic valve repair/ JUSTIN clipping.   Currently Sami receives care at home.   Complications from the hospital stay include none.   The patient stated that they do need help with their daily life and activities.   The patient stated that they do have emotional support at home.     Sami Quiroga is a 59 y.o.male who presents for a transitional care management visit.    Within 48 business hours after discharge our office contacted him via telephone to coordinate his care and needs.      I reviewed and discussed the details of that call along with the discharge summary, hospital problems, inpatient lab results, inpatient diagnostic studies, and consultation reports with Sami.     Current outpatient and discharge medications have been reconciled for the patient.  Reviewed by: Renard Mcintosh MD     Risk for Readmission (LACE) Score: 8 (8/27/2024  6:00 AM)    On 822/24 he underwent elective ascending aortic aneurysm repair (30 mm Gelweave dacryon interposition graft), aortic valve repair with plication of conjoined leaflet, and left atrial appendage closure (35mm AtriClip).  He tolerated the procedure was transferred to open-heart recovery.  Full details can be found in operative report.  He remained stable and was able to be extubated shortly after surgery on postop day 0.  He remained stable overnight and was able to be de-escalated on postop day 1.  He did have ST elevation consistent with pericarditis and was started on colchicine.  On POD 2  chest tubes, central line, and Salamanca catheter were removed without issue.  On POD 3 epicardial wires were removed.  He continued to progress with increased activity, diuresis, and weaning of supplemental oxygen.  Did have intermittent borderline hypotension that was asymptomatic but made it difficult to titrate beta-blocker.  Discussed with Dr. Alex and patient was started on low-dose midodrine.  Overnight on POD4 he had a very brief episode of atrial fibrillation with RVR that lasted less than 15 minutes.  He converted to sinus rhythm before IV amiodarone was even started.  The following morning he was started on 200 mg p.o. amnio daily remains in sinus rhythm.  On postop day 5 he was ready for discharge.  He is completed almost 5 days of colchicine and will discontinue due to the interaction with amiodarone.  Amio was discontinued at cardiology follow up appt.     C/O left hand 4th and 5th finger numbness and weakness, feels asleep. Pt states that prior to surgery had occasional left shoulder discomfort that might have caused shooting sx down left arm.          The following portions of the patient's history were reviewed and updated as appropriate: allergies, current medications, past family history, past medical history, past social history, past surgical history, and problem list.    Patient Active Problem List   Diagnosis    Heart murmur    Hypertension    Hyperlipidemia    Nonrheumatic aortic valve insufficiency    Thoracic aortic aneurysm without rupture    Thoracic aortic ectasia    Nocturia    Aortic valve disorder    Aortic root dilation    Anxiety    Allergic rhinitis    Aneurysm of ascending aorta    H/O bicuspid aortic valve    Postoperative atrial fibrillation       Current Outpatient Medications on File Prior to Visit   Medication Sig Dispense Refill    aspirin 81 MG EC tablet Take 1 tablet by mouth Daily. 30 tablet 2    atorvastatin (LIPITOR) 20 MG tablet TAKE 1 TABLET BY MOUTH EVERY NIGHT AT  "BEDTIME 90 tablet 1    metoprolol tartrate (LOPRESSOR) 25 MG tablet Take 0.5 tablets by mouth Every 12 (Twelve) Hours. 30 tablet 2    midodrine (PROAMATINE) 5 MG tablet Take 1 tablet by mouth 3 (Three) Times a Day Before Meals for 30 days. 90 tablet 0    tamsulosin (FLOMAX) 0.4 MG capsule 24 hr capsule Take 1 capsule by mouth Daily.       Current Facility-Administered Medications on File Prior to Visit   Medication Dose Route Frequency Provider Last Rate Last Admin    Chlorhexidine Gluconate Cloth 2 % pads   Topical Q12H PRN Yoana Vargas APRN         Current outpatient and discharge medications have been reconciled for the patient.  Reviewed by: Renard Mcintosh MD      No Known Allergies    Review of Systems   Constitutional:  Negative for activity change, appetite change, fatigue and fever.   HENT:  Negative for ear pain, swollen glands and voice change.    Eyes:  Negative for visual disturbance.   Respiratory:  Negative for shortness of breath and wheezing.    Cardiovascular:  Negative for chest pain and leg swelling.   Gastrointestinal:  Negative for abdominal pain, blood in stool, constipation, diarrhea, nausea and vomiting.   Endocrine: Negative for polydipsia and polyuria.   Genitourinary:  Negative for dysuria, frequency and hematuria.   Musculoskeletal:  Negative for joint swelling, neck pain and neck stiffness.   Skin:  Negative for rash and wound.   Neurological:  Negative for weakness, numbness and headache.   Psychiatric/Behavioral:  Negative for suicidal ideas and depressed mood.      I have reviewed and confirmed the accuracy of the ROS as documented by the MA/LPN/RN Renard Mcintosh MD    Objective   Visit Vitals  /82 (BP Location: Right arm, Patient Position: Sitting, Cuff Size: Adult)   Pulse 95   Temp 97.3 °F (36.3 °C) (Temporal)   Resp 18   Ht 180.3 cm (71\")   Wt 87.3 kg (192 lb 6.4 oz)   SpO2 97%   BMI 26.83 kg/m²      **  Physical Exam  Constitutional:       " Appearance: He is well-developed.   HENT:      Head: Normocephalic and atraumatic.      Right Ear: External ear normal.      Left Ear: External ear normal.      Nose: Nose normal.   Eyes:      Pupils: Pupils are equal, round, and reactive to light.   Cardiovascular:      Rate and Rhythm: Normal rate and regular rhythm.      Heart sounds: Normal heart sounds.   Pulmonary:      Effort: Pulmonary effort is normal.      Breath sounds: Normal breath sounds.   Abdominal:      General: Bowel sounds are normal.      Palpations: Abdomen is soft.   Musculoskeletal:         General: Normal range of motion.      Cervical back: Normal range of motion and neck supple.   Skin:     General: Skin is warm and dry.   Neurological:      Mental Status: He is alert and oriented to person, place, and time.      Comments: Decreased sensation left arm to 4th and 5th fingers with some weakness    Psychiatric:         Behavior: Behavior normal.         Thought Content: Thought content normal.         Judgment: Judgment normal.       Derm Physical Exam  Ortho Exam   Neurologic Exam     Mental Status   Oriented to person, place, and time.     Cranial Nerves     CN III, IV, VI   Pupils are equal, round, and reactive to light.         Diagnoses and all orders for this visit:    1. Hospital discharge follow-up (Primary)    2. Overweight with body mass index (BMI) of 28 to 28.9 in adult    3. Former smoker    4. Aortic root dilation  Overview:  4.25 cm on echo noted incidentially. Followed by Dr Daugherty.     Assessment & Plan:  S/p Ascending aortic aneurysm repair       5. Arm numbness left  Comments:  ulnar distribution, will monitor for now consider c spine xray or EMG if sx persist     Findings discussed. All questions answered.  Follow-up for routine health maintenance as indicated.  Follow up in 6 months for recheck, sooner for worsening symptoms or any concerns.    Differential diagnosis discussed.    BMI is >= 25 and <30. (Overweight) The  following options were offered after discussion;: none (medical contraindication)      Expected course, medications, and adverse effects discussed as appropriate.  Call or return if worsening or persistent symptoms.     This document is intended for medical professional use only.   Electronically signed by Renard Mcintosh MD on 09/05/2024. This content may not have been proofread.

## 2024-09-06 PROBLEM — R94.31 PROLONGED Q-T INTERVAL ON ECG: Status: ACTIVE | Noted: 2024-09-06

## 2024-09-12 ENCOUNTER — OFFICE VISIT (OUTPATIENT)
Dept: CARDIAC SURGERY | Facility: CLINIC | Age: 59
End: 2024-09-12
Payer: COMMERCIAL

## 2024-09-12 VITALS
TEMPERATURE: 97.7 F | SYSTOLIC BLOOD PRESSURE: 125 MMHG | RESPIRATION RATE: 18 BRPM | HEART RATE: 89 BPM | OXYGEN SATURATION: 97 % | DIASTOLIC BLOOD PRESSURE: 83 MMHG | HEIGHT: 71 IN | BODY MASS INDEX: 26.85 KG/M2 | WEIGHT: 191.8 LBS

## 2024-09-12 DIAGNOSIS — Z98.890 S/P ASCENDING AORTIC ANEURYSM REPAIR: ICD-10-CM

## 2024-09-12 DIAGNOSIS — Z87.74 H/O BICUSPID AORTIC VALVE: Primary | ICD-10-CM

## 2024-09-12 DIAGNOSIS — Z86.79 S/P ASCENDING AORTIC ANEURYSM REPAIR: ICD-10-CM

## 2024-09-12 PROCEDURE — 99024 POSTOP FOLLOW-UP VISIT: CPT | Performed by: NURSE PRACTITIONER

## 2024-09-12 NOTE — PROGRESS NOTES
"CARDIOVASCULAR SURGERY FOLLOW-UP PROGRESS NOTE  Chief Complaint: Post-op Follow Up        HPI:   Dear Dr. Mcintosh, Renard Pope MD and colleagues:    It was nice to see Sami Quiroga in follow up today after cardiac surgery.  As you know, he is a 59 y.o. male with 5 cm ascending aortic aneurysm, bicuspid aortopathy, and mild to moderate AI who underwent elective ascending aortic aneurysm repair with a 30 mm graft, aortic valve repair with plication of conjoined leaflet, release of the raphae of same leaflet and narrowing of the sinotublar junction symmetrically to 30 mm, and left atrial appendage epicardial closure with 35 mm atrial clip device at [facility] by Dr. Alex on 8/22/2024. He did well postoperatively but had a brief run of PAF and postop pericarditis.  He comes in today complaining of nothing.  His activity level has been good.  He walked 1 mile this morning and plans on walking 1 mile this evening.  He has seen cardiology.  He has completed his amiodarone course of medication.  He has not started cardiac rehab.  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    Physical Exam:         /83 (BP Location: Left arm, Patient Position: Sitting, Cuff Size: Adult)   Pulse 89   Temp 97.7 °F (36.5 °C)   Resp 18   Ht 180.3 cm (71\")   Wt 87 kg (191 lb 12.8 oz)   SpO2 97%   BMI 26.75 kg/m²   Heart:  regular rate and rhythm  Lungs:  clear to auscultation bilaterally  Extremities:  no edema  Incision(s):  mid chest healing well, sternum stable    Assessment/Plan:     S/P elective ascending aortic aneurysm repair with a 30 mm graft, aortic valve repair with plication of conjoined leaflet, release of the raphae of same leaflet and narrowing of the sinotublar junction symmetrically to 30 mm, and left atrial appendage epicardial closure with 35 mm atrial clip device. Overall, he is doing well.  I will reach out to cardiac rehab " to see if we can get him enrolled soon.  We reviewed his preop CT scan looking at his aneurysm.  We discussed that he will need to be followed yearly in the [facility].  He is also interested in possibly stopping the aspirin therapy d/t risk of bleeding.  I told him I would need to discuss this with Dr. Alex and will get back with him.    I d/w Dr. Alex, he should continue low dose aspirin for 3 months and then could discontinue it.    Post-op atrial fibrillation--resolved while in hospital  Post-op pericarditis--completed colchicine    Keep incisions clean and dry  OK to drive if not taking narcotic pain medicine  OK to begin cardiac rehab  Follow-up as scheduled with cardiology  Follow-up as scheduled with PCP  Follow-up with CT surgery in the [facility] 1 year with CT chest and echo    Continue lifting restriction of 10 lbs until 6 weeks and 50 lbs until 12 weeks from the date of surgery, no excessive jarring motions or twisting motions until 12 weeks from the date of surgery.    Thank you for allowing me to participate in the care of your patient.    Regards,  Yoana Vargas, STEPHAN

## 2024-09-12 NOTE — LETTER
September 12, 2024     Renard Mcintosh MD  [address] [place]  [facility] [address]  [place] [address]    Patient: Sami Quiroga   YOB: 1965   Date of Visit: 9/12/2024     Dear Renard Mcintosh MD:       Thank you for referring Sami Quiroga to me for evaluation. Below are the relevant portions of my assessment and plan of care.    If you have questions, please do not hesitate to call me. I look forward to following Sami along with you.         Sincerely,        STEPHAN Huerta        CC: MD Sam Ferrara Tabitha L, APRN  09/12/24 1616  Addendum  CARDIOVASCULAR SURGERY FOLLOW-UP PROGRESS NOTE  Chief Complaint: Post-op Follow Up        HPI:   Dear Dr. Mcintosh, Renard Pope MD and colleagues:    It was nice to see Sami Quiroga in follow up today after cardiac surgery.  As you know, he is a 59 y.o. male with 5 cm ascending aortic aneurysm, bicuspid aortopathy, and mild to moderate AI who underwent elective ascending aortic aneurysm repair with a 30 mm graft, aortic valve repair with plication of conjoined leaflet, release of the raphae of same leaflet and narrowing of the sinotublar junction symmetrically to 30 mm, and left atrial appendage epicardial closure with 35 mm atrial clip device at [facility] by Dr. Alex on 8/22/2024. He did well postoperatively but had a brief run of PAF and postop pericarditis.  He comes in today complaining of nothing.  His activity level has been good.  He walked 1 mile this morning and plans on walking 1 mile this evening.  He has seen cardiology.  He has completed his amiodarone course of medication.  He has not started cardiac rehab.  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    Physical Exam:         /83 (BP Location: Left arm, Patient Position: Sitting, Cuff Size: Adult)   Pulse 89   Temp 97.7 °F (36.5 °C)   Resp 18   Ht  "180.3 cm (71\")   Wt 87 kg (191 lb 12.8 oz)   SpO2 97%   BMI 26.75 kg/m²   Heart:  regular rate and rhythm  Lungs:  clear to auscultation bilaterally  Extremities:  no edema  Incision(s):  mid chest healing well, sternum stable    Assessment/Plan:     S/P elective ascending aortic aneurysm repair with a 30 mm graft, aortic valve repair with plication of conjoined leaflet, release of the raphae of same leaflet and narrowing of the sinotublar junction symmetrically to 30 mm, and left atrial appendage epicardial closure with 35 mm atrial clip device. Overall, he is doing well.  I will reach out to cardiac rehab to see if we can get him enrolled soon.  We reviewed his preop CT scan looking at his aneurysm.  We discussed that he will need to be followed yearly in the [facility].  He is also interested in possibly stopping the aspirin therapy d/t risk of bleeding.  I told him I would need to discuss this with Dr. Alex and will get back with him.    I d/w Dr. Alex, he should continue low dose aspirin for 3 months and then could discontinue it.    Post-op atrial fibrillation--resolved while in hospital  Post-op pericarditis--completed colchicine    Keep incisions clean and dry  OK to drive if not taking narcotic pain medicine  OK to begin cardiac rehab  Follow-up as scheduled with cardiology  Follow-up as scheduled with PCP  Follow-up with CT surgery in the [facility] 1 year with CT chest and echo    Continue lifting restriction of 10 lbs until 6 weeks and 50 lbs until 12 weeks from the date of surgery, no excessive jarring motions or twisting motions until 12 weeks from the date of surgery.    Thank you for allowing me to participate in the care of your patient.    Regards,  Yoana Vargas, STEPHAN    "

## 2024-09-13 ENCOUNTER — TELEPHONE (OUTPATIENT)
Dept: CARDIAC REHAB | Facility: HOSPITAL | Age: 59
End: 2024-09-13
Payer: COMMERCIAL

## 2024-09-13 DIAGNOSIS — Z87.74 H/O BICUSPID AORTIC VALVE: ICD-10-CM

## 2024-09-13 DIAGNOSIS — Z98.890 S/P ASCENDING AORTIC ANEURYSM REPAIR: Primary | ICD-10-CM

## 2024-09-13 DIAGNOSIS — I71.21 ANEURYSM OF ASCENDING AORTA WITHOUT RUPTURE: ICD-10-CM

## 2024-09-13 DIAGNOSIS — Z86.79 S/P ASCENDING AORTIC ANEURYSM REPAIR: Primary | ICD-10-CM

## 2024-10-23 RX ORDER — METOPROLOL TARTRATE 25 MG/1
12.5 TABLET, FILM COATED ORAL EVERY 12 HOURS SCHEDULED
Qty: 30 TABLET | Refills: 2 | Status: SHIPPED | OUTPATIENT
Start: 2024-10-23 | End: 2024-10-25

## 2024-10-23 RX ORDER — METOPROLOL TARTRATE 25 MG/1
12.5 TABLET, FILM COATED ORAL EVERY 12 HOURS SCHEDULED
Qty: 30 TABLET | Refills: 2 | OUTPATIENT
Start: 2024-10-23

## 2024-10-23 NOTE — TELEPHONE ENCOUNTER
Caller: Sami Quiroga     Relationship: Self     Best call back number: [phone]        Requested Prescriptions:   Requested Prescriptions             Pending Prescriptions Disp Refills    metoprolol tartrate (LOPRESSOR) 25 MG tablet 30 tablet 2       Sig: Take 0.5 tablets by mouth Every 12 (Twelve) Hours.         Pharmacy where request should be sent: [id number] [place] [address] [address] AT [facility] [address]  [address] [place] - [phone] [facility] [phone] FX      Last office visit with prescribing clinician: 9/12/2024   Last telemedicine visit with prescribing clinician: Visit date not found   Next office visit with prescribing clinician: 10/1/2025      Additional details provided by patient: PT STATES INSURANCE NEEDS A 90 DAY SUPPLY CALLED IN AS OPPOSED TO A 30     Does the patient have less than a 3 day supply:  [x] Yes  [] No     Would you like a call back once the refill request has been completed: [x] Yes [] No     If the office needs to give you a call back, can they leave a voicemail: [x] Yes [] No     Carlos Bethea Rep   10/23/24 10:17 EDT

## 2024-10-23 NOTE — TELEPHONE ENCOUNTER
Caller: Sami Quiroga    Relationship: Self    Best call back number: [phone]      Requested Prescriptions:   Requested Prescriptions     Pending Prescriptions Disp Refills    metoprolol tartrate (LOPRESSOR) 25 MG tablet 30 tablet 2     Sig: Take 0.5 tablets by mouth Every 12 (Twelve) Hours.        Pharmacy where request should be sent: [facility] [id number] - [place] [address] [address] AT [facility] OF  [address] [facility] - [phone] [facility] [phone]     Last office visit with prescribing clinician: 9/12/2024   Last telemedicine visit with prescribing clinician: Visit date not found   Next office visit with prescribing clinician: 10/1/2025     Additional details provided by patient: PT STATES INSURANCE NEEDS A 90 DAY SUPPLY CALLED IN AS OPPOSED TO A 30    Does the patient have less than a 3 day supply:  [x] Yes  [] No    Would you like a call back once the refill request has been completed: [x] Yes [] No    If the office needs to give you a call back, can they leave a voicemail: [x] Yes [] No    Carlos Bethea Rep   10/23/24 10:17 EDT

## 2024-10-25 ENCOUNTER — TELEPHONE (OUTPATIENT)
Dept: CARDIAC SURGERY | Facility: CLINIC | Age: 59
End: 2024-10-25

## 2024-10-25 RX ORDER — METOPROLOL TARTRATE 25 MG/1
12.5 TABLET, FILM COATED ORAL EVERY 12 HOURS
Qty: 90 TABLET | Refills: 0 | Status: SHIPPED | OUTPATIENT
Start: 2024-10-25

## 2024-10-25 NOTE — TELEPHONE ENCOUNTER
Caller: Sami Quiroga    Relationship: Self    Best call back number: [phone]    Requested Prescriptions:   Requested Prescriptions      No prescriptions requested or ordered in this encounter        metoprolol tartrate (LOPRESSOR) 25 MG tablet       Pharmacy where request should be sent:    [id number] - [place]  [address] [facility] [address] AT [address] OF  &  - [phone] PH - [phone]     Last office visit with prescribing clinician: 6/20/2024   Last telemedicine visit with prescribing clinician: Visit date not found   Next office visit with prescribing clinician: Visit date not found     Additional details provided by patient:     Does the patient have less than a 3 day supply:  [] Yes  [x] No    Would you like a call back once the refill request has been completed: [x] Yes [] No    If the office needs to give you a call back, can they leave a voicemail: [x] Yes [] No    Carlos Pickett Rep   10/25/24 13:26 EDT

## 2024-11-29 DIAGNOSIS — E78.2 MIXED HYPERLIPIDEMIA: ICD-10-CM

## 2024-12-02 RX ORDER — ATORVASTATIN CALCIUM 20 MG/1
20 TABLET, FILM COATED ORAL
Qty: 90 TABLET | Refills: 0 | Status: SHIPPED | OUTPATIENT
Start: 2024-12-02

## 2024-12-04 ENCOUNTER — OFFICE VISIT (OUTPATIENT)
Dept: CARDIOLOGY | Facility: CLINIC | Age: 59
End: 2024-12-04
Payer: COMMERCIAL

## 2024-12-04 VITALS
OXYGEN SATURATION: 96 % | HEIGHT: 71 IN | HEART RATE: 80 BPM | BODY MASS INDEX: 28.78 KG/M2 | SYSTOLIC BLOOD PRESSURE: 131 MMHG | DIASTOLIC BLOOD PRESSURE: 79 MMHG | WEIGHT: 205.6 LBS

## 2024-12-04 DIAGNOSIS — I71.21 ANEURYSM OF ASCENDING AORTA WITHOUT RUPTURE: ICD-10-CM

## 2024-12-04 DIAGNOSIS — I35.1 NONRHEUMATIC AORTIC VALVE INSUFFICIENCY: Primary | ICD-10-CM

## 2024-12-04 DIAGNOSIS — Z87.74 H/O BICUSPID AORTIC VALVE: ICD-10-CM

## 2024-12-04 DIAGNOSIS — E78.5 HYPERLIPIDEMIA LDL GOAL <100: ICD-10-CM

## 2024-12-04 PROCEDURE — 99214 OFFICE O/P EST MOD 30 MIN: CPT | Performed by: INTERNAL MEDICINE

## 2024-12-04 NOTE — PROGRESS NOTES
"Cardiology Office Visit      Encounter Date:  12/04/2024    Patient ID:   Sami Quiroga is a 59 y.o. male.    Reason For Followup:  Aortic regurgitation and ascending aortic aneurysm  Status post surgical repair  Brief Clinical History:  Dear Renard Armijo MD    I had the pleasure of seeing Sami Quiroga today. As you are well aware, this is a 58 y.o. male  with history of aortic valve disorder with aortic insufficiency post surgical repair here for follow-up        Interval History:  Denies any symptoms of chest pain shortness of breath dizziness or syncope  Denies any new cardiac symptoms  No significant change in the functional status  Compliant with medical therapy  Assessment & Plan    Impressions:  Aortic regurgitation, moderate/status post surgical repair  Bicuspid aortic valve  Ascending aortic aneurysm/status post surgical repair  Hypertension  Moderate aortic insufficiency/status post surgical repair  Elective ascending aortic aneurysm repair with a 30 mm graft, aortic valve repair with plication of conjoined leaflet, release of the raphae of same leaflet and narrowing of the sinotublar junction symmetrically to 30 mm, and left atrial appendage epicardial closure with 35 mm atrial clip device     Recommendations:  Continue current medical therapy  Repeat echocardiogram to reassess ascending aorta and also aortic valve function status post surgical repair  Patient is scheduled to have a CT of the aorta by surgical team sometime next year for follow-up from the prior surgery  Need for antibiotic prophylaxis for dental procedures reviewed and discussed with patient  Prior available medical records reviewed and discussed with patient  Check labs including CBC CMP and lipids in the next 1 month  follow-up in office in 6 months        Vitals:  Vitals:    12/04/24 1347   BP: 131/79   Pulse: 80   SpO2: 96%   Weight: 93.3 kg (205 lb 9.6 oz)   Height: 180.3 cm (71\")       Physical " Exam:    General: Alert, cooperative, no distress, appears stated age  Head:  Normocephalic, atraumatic, mucous membranes moist  Eyes:  Conjunctiva/corneas clear, EOM's intact     Neck:  Supple,  no adenopathy;      Lungs: Clear to auscultation bilaterally, no wheezes rhonchi rales are noted  Chest wall: No tenderness  Heart::  Regular rate and rhythm, S1 and S2 normal, no murmur, rub or gallop  Abdomen: Soft, non-tender, nondistended bowel sounds active  Extremities: No cyanosis, clubbing, or edema  Pulses: 2+ and symmetric all extremities  Skin:  No rashes or lesions  Neuro/psych: A&O x3. CN II through XII are grossly intact with appropriate affect              Lab Results   Component Value Date    GLUCOSE 107 (H) 08/27/2024    BUN 21 (H) 08/27/2024    CREATININE 0.88 08/27/2024    EGFRRESULT 87 10/18/2022    EGFR 99.7 08/27/2024    BCR 23.9 08/27/2024    K 4.2 08/27/2024    CO2 26.2 08/27/2024    CALCIUM 8.4 (L) 08/27/2024    PROTENTOTREF 7.5 10/18/2022    ALBUMIN 4.4 08/23/2024    BILITOT 0.9 08/21/2024    AST 32 08/21/2024    ALT 42 (H) 08/21/2024     Results for orders placed in visit on 08/22/24    Intra-Op Anesthesia CHACE    Narrative  Intra-Op Anesthesia CHACE    Procedure Performed: Intra-Op Anesthesia CHACE  Start Time:  End Time:    Preanesthesia Checklist:  Patient identified, IV assessed, risks and benefits discussed, monitors and equipment assessed, procedure being performed at surgeon's request and anesthesia consent obtained.    General Procedure Information  Diagnostic Indications for Echo:  assessment of surgical repair and hemodynamic monitoring  Location performed:  OR  Intubated  Bite block placed  Heart visualized  Probe Insertion:  Easy  Probe Type:  Biplane and multiplane  Modalities:  Color flow mapping, pulse wave Doppler and continuous wave Doppler    Echocardiographic and Doppler Measurements    Ventricles    Right Ventricle:  Cavity size normal.  Hypertrophy not present.  Thrombus not  present.  Global function normal.  Ejection Fraction 60%.  Left Ventricle:  Cavity size normal.  Thrombus not present.  Global Function normal.  Ejection Fraction 60%.    Ventricular Regional Function:  1- Basal Anteroseptal:  normal  2- Basal Anterior:  normal  3- Basal Anterolateral:  normal  4- Basal Inferolateral:  normal  5- Basal Inferior:  normal  6- Basal Inferoseptal:  normal  7- Mid Anteroseptal:  normal  8- Mid Anterior:  normal  9- Mid Anterolateral:  normal  10- Mid Inferolateral:  normal  11- Mid Inferior:  normal  12- Mid Inferoseptal:  normal  13- Apical Anterior:  normal  14- Apical Lateral:  normal  15- Apical Inferior:  normal  16- Apical Septal:  normal  17- Moriches:  normal      Valves    Aortic Valve:  Annulus normal.  Stenosis mild.  Regurgitation absent.  Leaflets bicuspid.  Leaflet motions restricted.    Mitral Valve:  Annulus normal.  Stenosis not present.  Regurgitation absent.  Leaflets normal.  Leaflet motions normal.    Tricuspid Valve:  Annulus normal.  Stenosis not present.  Regurgitation absent.  Leaflets normal.  Leaflet motions normal.  Pulmonic Valve:  Annulus normal.  Stenosis not present.  Regurgitation absent.      Aorta    Ascending Aorta:  Size normal.  Dissection not present.  Plaque thickness less than 3 mm.  Mobile plaque not present.  Aortic Arch:  Size normal.  Dissection not present.  Plaque thickness less than 3 mm.  Mobile plaque not present.  Descending Aorta:  Size normal.  Dissection not present.  Plaque thickness less than 3 mm.  Mobile plaque not present.      Atria    Right Atrium:  Size normal.  Left Atrium:  Size dilated.  Spontaneous echo contrast not present.  Thrombus not present.  Tumor not present.  Device not present.  Left atrial appendage normal.      Septa    Atrial Septum:  Intra-atrial septal morphology contains patent foramen ovale.  Patent foramen ovale shunts left to right.    Ventricular Septum:  Intra-ventricular septum morphology  normal.        Other Findings  Pericardium:  normal  Pleural Effusion:  none  Pulmonary Arteries:  normal      Anesthesia Information  Performed Personally  Anesthesiologist:  Jethro Swan MD      Echocardiogram Comments:  CHACE placed without resistance, lubricated, bite guard      Pre cpb CHACE  LV no WMA EF 60  RV nl SF  MV wnl  TV wnl  RA dilated  AV bicuspid mild eccentric AI (toward AML) . Dilated aortic root 3.9 CM STJ 4cm asc aorta 4.4 cm taper to nl in distal arch      Post cpb AV repair, asc aorta replace  LV RV no change  MV TV no change  AV bicuspid repair, no AI     Results for orders placed during the hospital encounter of 06/17/24    Cardiac Catheterization/Vascular Study    Conclusion  Table formatting from the original result was not included.  Cardiac Catheterization Operative Report    Sami Quiroga  [id number]  6/17/2024  @PCP@    He underwent cardiac catheterization.    Indications for the procedure include: Ascending aortic aneurysm and aortic regurgitation plans for surgical intervention.    Results for orders placed during the hospital encounter of 12/18/23    Adult Transthoracic Echo Complete W/ Cont if Necessary Per Protocol    Interpretation Summary    Left ventricular systolic function is normal. Calculated left ventricular EF = 63% Left ventricular ejection fraction appears to be 61 - 65%.    Left ventricular diastolic function is consistent with (grade I) impaired relaxation.    The left atrial cavity is mild to moderately dilated.    Estimated right ventricular systolic pressure from tricuspid regurgitation is mildly elevated (35-45 mmHg).    Moderate dilation of the aortic root is present. Severe dilation of the ascending aorta is present.    Ascending aortic aneurysm measuring 4.4 cm    No results found for this or any previous visit.        Procedure Details:  The risks, benefits, complications, treatment options, and expected outcomes were discussed with the patient. The patient  and/or family concurred with the proposed plan, giving informed consent.    After informed consent the patient was brought to the cath lab after appropriate IV hydration was begun and oral premedication was given. He was further sedated with fentanyl. He was prepped and draped in the usual manner. Using the modified Seldinger access technique, a 6 Mauritan[place] sheath was placed in the femoral artery. A left heart catheterization with coronary arteriography was performed. Findings are discussed below.  We also attempted a right radial access but unable to engage the left main coronary artery secondary to ascending aorta aneurysm and tortuous anatomy    After the procedure was completed, sedation was stopped and the sheaths and catheters were all removed. Hemostasis was achieved per established hospital protocols.    Conscious sedation:  Conscious sedation was performed according to protocol.  I supervised and directed an independent trained observer with the assistance of monitoring the patient's level of consciousness and physiologic status throughout the procedure.  Intraoperative service time was 60 minutes.    Findings:    Hemodynamics Central aortic pressure systolic 104 diastolic 76 with a mean pressure of 90 mmHg    Left Main Large-caliber vessel angiographically normal trifurcates into left into descending left circumflex artery and moderate caliber ramus intermediate branch  RCA Large-caliber vessel inferior and anterior takeoff with no evidence of any angiographic stenosis  Right coronary artery is angiographically normal right coronary artery provides a large caliber PDA and PLV branches that are angiographically normal  LAD Large-caliber vessel angiographically normal  First diagonal branch of the LAD is a moderate caliber vessel angiographically normal  Second diagonal branch of the LAD is a moderate caliber vessel angiographically normal  Circ Large-caliber vessel angiographically normal  Moderate caliber  ramus intermediate branch angiographically normal  LV Not done  Coronary Dominance Right coronary artery    Estimated Blood Loss:  Minimal    Specimens: None    Complications:  None; patient tolerated the procedure well.    Disposition: PACU - hemodynamically stable.    Condition: stable    Impressions:  Normal coronaries    Recommendations:  Surgical evaluation for consideration for ascending aortic aneurysm repair and aortic valve repair  Test results reviewed and discussed with patient and family     Lab Results   Component Value Date    CHOL 130 2024    CHLPL 136 10/18/2022    TRIG 154 (H) 2024    HDL 36 (L) 2024    LDL 67 2024      Results for orders placed during the hospital encounter of 21    Treadmill Stress Test    Interpretation Summary  · No ECG evidence of myocardial ischemia. Negative clinical evidence of myocardial ischemia. Findings consistent with a normal ECG stress test.   Results for orders placed in visit on 17    CARDIOVASCULAR STUDIES - CONVERTED    Narrative  Adult Echocardiogram Report  [address] [address]  Name: MARI ERVIN EStudy Date: 2017 08:37 AM  MRN: [id number]        Patient Location:   : 1965       Gender: Male                              Height: 71 in  Age: 52 yrs           Account#: [id number]                     Weight: 186 lb  Reason For Study: Aortic valve disorder  BSA: 2.0 m2  History: HYPERTENSION, MURMUR, AORTIC ECTASIA  Ordering Physician:  ERNIE JASSO  Referring Physician:  QUEENIE LEUNG  Performed By: CHAYITO  M-Mode/2-D Measurements:  LVIDd: 5.0 cm       (3.7-5.7) LVPWd: 1.0 cm        (0.8-1.2)  LVIDs: 3.5 cm       (2.3-3.9)  ACS: 2.0 cm         (1.6-3.7) IVSd: 1.1 cm         (0.7-1.2)  LA dimension: 2.5 cm(1.9-4.0) RVDd: 1.7 cm         (0.7-2.4)  FS: 31.6 %          (21-40%)  Ao root diam: 3.7 cm (2.0-3.7)  Comments  The left ventricle is normal in size. There is  normal left ventricular wall  thickness. Left ventricular systolic function is normal. Ejection Fraction is  60-65%. The left ventricular wall motion is normal. The right ventricle is  normal size. The right ventricular systolic function is normal. The left  atrial size is normal. Right atrial size is normal. The mitral valve is  grossly normal in structure. There is no mitral valve stenosis. There is mild  mitral regurgitation. The tricuspid valve is not well visualized, but is  grossly normal. There is no tricuspid stenosis. There is mild tricuspid  regurgitation. Right ventricular systolic pressure is elevated at 40-50mmHg.  Aortic valve is trileaflet with satisfactory opening and coaptation. The  aortic valve opens well. No hemodynamically significant valvular aortic  stenosis. Mild to moderate aortic regurgitation. The pulmonic valve is not  well visualized. There is no pulmonic valvular stenosis. Trace pulmonic  valvular regurgitation. The aortic root is normal size. Dilated ascending  aorta ascending aorta measures 4.3 cm consider dedicated CT scan for further  evaluation. There is no pericardial effusion.  Interpretation  Dilated ascending aorta ascending aorta measures 4.3 cm consider dedicated CT  scan for further evaluation  Right ventricular systolic pressure is elevated at 40-50mmHg.  Left ventricular systolic function is normal.  Ejection Fraction is 60-65%  The left ventricular wall motion is normal.  MMode/2D Measurements T Calculations  ESV(Teich): 49.2 ml                 Ao root area: 10.8 cm2  EF(Teich): 59.2 %  LVOT diam: 2.3 cm                   EDV(MOD-sp4): 127.8 ml  ESV(MOD-sp4): 52.3 ml  EF(MOD-sp4): 59.0 %  Doppler Measurements T Calculations  MV E max vu: 93.7 cm/sec             MV max P.7 mmHg  MV A max vu: 84.4 cm/sec             MV mean P.5 mmHg  MV E/A: 1.1  Ao V2 max: 169.1 cm/sec               AI max vu: 428.9 cm/sec  Ao max P.4 mmHg                  AI max P.1  mmHg  Ao V2 mean: 110.3 cm/sec              AI dec slope: 223.7 cm/sec2  Ao mean P.7 mmHg                  AI dec time: 1.9 sec  Ao V2 VTI: 38.1 cm                    AI P1/2t: 561.5 msec  KAVITHA(I,D): 3.0 cm2  KAVITHA(V,D): 2.8 cm2  LV V1 max P.3 mmHg                MR max vu: 473.1 cm/sec  LV V1 mean P.9 mmHg               MR max P.6 mmHg  LV V1 max: 115.4 cm/sec  LV V1 mean: 78.3 cm/sec  LV V1 VTI: 27.7 cm  PA max PG: 3.4 mmHg                   PI end-d vu: 78.4 cm/sec  TR max vu: 264.3 cm/sec  TR max P.7 mmHg  RVSP(TR): 41.7 mmHg  _______________________________________________________________________________  Electronically signed by: MD Brittanie Lopez  on 2017 12:26 PM    Electronically signed by Brittanie Lopez MD on 2017 at 1:58 PM  ________________________________________________________________________      Disclaimer: Converted Note message may not contain all data elements that existed in the legacy source system. Please see [employer] for the original note details.           Objective:          Allergies:  No Known Allergies    Medication Review:     Current Outpatient Medications:     atorvastatin (LIPITOR) 20 MG tablet, TAKE 1 TABLET BY MOUTH EVERY NIGHT AT BEDTIME, Disp: 90 tablet, Rfl: 0    metoprolol tartrate (LOPRESSOR) 25 MG tablet, TAKE 1/2 TABLET BY MOUTH EVERY 12 HOURS, Disp: 90 tablet, Rfl: 0    tamsulosin (FLOMAX) 0.4 MG capsule 24 hr capsule, Take 1 capsule by mouth Daily., Disp: , Rfl:     aspirin 81 MG EC tablet, Take 1 tablet by mouth Daily., Disp: 30 tablet, Rfl: 2    Family History:  Family History   Problem Relation Age of Onset    Hypertension Mother     Hypertension Father        Past Medical History:  Past Medical History:   Diagnosis Date    Aortic insufficiency due to bicuspid aortic valve     Ascending aortic aneurysm     Bicuspid aortic valve     Heart murmur     Hyperlipidemia     Hypertension        Past surgical  History:  Past Surgical History:   Procedure Laterality Date    APPENDECTOMY      ASCENDING AORTIC ANEURYSM REPAIR N/A 2024    Procedure: ASCENDING AORTIC ARCH replaced with 30mm gelweave graft SN [id number], left atrial appendage exclusion with 35mm atriclip,  with intraoperative CHACE;  Surgeon: Carlos Alex MD;  Location: [facility];  Service: Cardiothoracic;  Laterality: N/A;    CARDIAC CATHETERIZATION Right 2024    Procedure: Left Heart Cath, RADIAL;  Surgeon: Brittanie Lopez MD;  Location: [facility] CATH INVASIVE LOCATION;  Service: Cardiovascular;  Laterality: Right;  cath in 2024       Social History:  Social History     Socioeconomic History    Marital status:    Tobacco Use    Smoking status: Former     Current packs/day: 0.00     Average packs/day: 1 pack/day for 4.0 years (4.0 ttl pk-yrs)     Types: Cigarettes     Start date: 1986     Quit date: 1990     Years since quittin.9    Smokeless tobacco: Never   Vaping Use    Vaping status: Never Used   Substance and Sexual Activity    Alcohol use: Yes     Comment: Rare     Drug use: No    Sexual activity: Defer       Review of Systems:  The following systems were reviewed as they relate to the cardiovascular system: Constitutional, Eyes, ENT, Cardiovascular, Respiratory, Gastrointestinal, Integumentary, Neurological, Psychiatric, Hematologic, Endocrine, Musculoskeletal, and Genitourinary. The pertinent cardiovascular findings are reported above with all other cardiovascular points within those systems being negative.    Diagnostic Study Review:     Current Electrocardiogram:  Procedures          NOTE: The following portions of the patient's history were reviewed and updated this visit as appropriate: allergies, current medications, past family history, past medical history, past social history, past surgical history and problem list.

## 2024-12-16 ENCOUNTER — HOSPITAL ENCOUNTER (OUTPATIENT)
Dept: CARDIOLOGY | Facility: HOSPITAL | Age: 59
Discharge: HOME | End: 2024-12-16
Admitting: NURSE PRACTITIONER
Payer: COMMERCIAL

## 2024-12-16 VITALS
HEIGHT: 71 IN | DIASTOLIC BLOOD PRESSURE: 77 MMHG | WEIGHT: 205.69 LBS | SYSTOLIC BLOOD PRESSURE: 123 MMHG | BODY MASS INDEX: 28.8 KG/M2

## 2024-12-16 DIAGNOSIS — Z87.74 H/O BICUSPID AORTIC VALVE: ICD-10-CM

## 2024-12-16 PROCEDURE — 93306 TTE W/DOPPLER COMPLETE: CPT

## 2024-12-17 LAB
BH CV ECHO MEAS - ACS: 1.34 CM
BH CV ECHO MEAS - AI P1/2T: 560.3 MSEC
BH CV ECHO MEAS - AO MAX PG: 16.7 MMHG
BH CV ECHO MEAS - AO MEAN PG: 8.7 MMHG
BH CV ECHO MEAS - AO ROOT DIAM: 3.7 CM
BH CV ECHO MEAS - AO V2 MAX: 204.2 CM/SEC
BH CV ECHO MEAS - AO V2 VTI: 46.8 CM
BH CV ECHO MEAS - AVA(I,D): 2.23 CM2
BH CV ECHO MEAS - EDV(CUBED): 101.1 ML
BH CV ECHO MEAS - EDV(MOD-SP4): 105 ML
BH CV ECHO MEAS - EF(MOD-BP): 45 %
BH CV ECHO MEAS - EF(MOD-SP4): 44.8 %
BH CV ECHO MEAS - ESV(CUBED): 47 ML
BH CV ECHO MEAS - ESV(MOD-SP4): 57.9 ML
BH CV ECHO MEAS - FS: 22.5 %
BH CV ECHO MEAS - IVS/LVPW: 0.99 CM
BH CV ECHO MEAS - IVSD: 1.18 CM
BH CV ECHO MEAS - LA DIMENSION: 3.3 CM
BH CV ECHO MEAS - LV DIASTOLIC VOL/BSA (35-75): 49.2 CM2
BH CV ECHO MEAS - LV MASS(C)D: 205.6 GRAMS
BH CV ECHO MEAS - LV MAX PG: 4.7 MMHG
BH CV ECHO MEAS - LV MEAN PG: 2.16 MMHG
BH CV ECHO MEAS - LV SYSTOLIC VOL/BSA (12-30): 27.2 CM2
BH CV ECHO MEAS - LV V1 MAX: 108.7 CM/SEC
BH CV ECHO MEAS - LV V1 VTI: 24.4 CM
BH CV ECHO MEAS - LVIDD: 4.7 CM
BH CV ECHO MEAS - LVIDS: 3.6 CM
BH CV ECHO MEAS - LVOT AREA: 4.3 CM2
BH CV ECHO MEAS - LVOT DIAM: 2.33 CM
BH CV ECHO MEAS - LVPWD: 1.19 CM
BH CV ECHO MEAS - MV A MAX VEL: 71 CM/SEC
BH CV ECHO MEAS - MV DEC SLOPE: 599 CM/SEC2
BH CV ECHO MEAS - MV DEC TIME: 0.21 SEC
BH CV ECHO MEAS - MV E MAX VEL: 124.2 CM/SEC
BH CV ECHO MEAS - MV E/A: 1.75
BH CV ECHO MEAS - MV MAX PG: 6.6 MMHG
BH CV ECHO MEAS - MV MEAN PG: 2.13 MMHG
BH CV ECHO MEAS - MV V2 VTI: 35.9 CM
BH CV ECHO MEAS - MVA(VTI): 2.9 CM2
BH CV ECHO MEAS - PA ACC TIME: 0.13 SEC
BH CV ECHO MEAS - PA V2 MAX: 79 CM/SEC
BH CV ECHO MEAS - PI END-D VEL: 59.3 CM/SEC
BH CV ECHO MEAS - PULM A REVS DUR: 0.08 SEC
BH CV ECHO MEAS - PULM A REVS VEL: 38 CM/SEC
BH CV ECHO MEAS - PULM DIAS VEL: 58.2 CM/SEC
BH CV ECHO MEAS - PULM S/D: 0.96
BH CV ECHO MEAS - PULM SYS VEL: 55.9 CM/SEC
BH CV ECHO MEAS - RAP SYSTOLE: 3 MMHG
BH CV ECHO MEAS - RVSP: 25.1 MMHG
BH CV ECHO MEAS - SV(LVOT): 104.1 ML
BH CV ECHO MEAS - SV(MOD-SP4): 47 ML
BH CV ECHO MEAS - SVI(LVOT): 48.8 ML/M2
BH CV ECHO MEAS - SVI(MOD-SP4): 22.1 ML/M2
BH CV ECHO MEAS - TAPSE (>1.6): 1.8 CM
BH CV ECHO MEAS - TR MAX PG: 22.1 MMHG
BH CV ECHO MEAS - TR MAX VEL: 234.6 CM/SEC
BH CV XLRA - RV BASE: 4.1 CM
BH CV XLRA - RV MID: 1.9 CM

## 2024-12-18 ENCOUNTER — TELEPHONE (OUTPATIENT)
Dept: CARDIAC SURGERY | Facility: CLINIC | Age: 59
End: 2024-12-18
Payer: COMMERCIAL

## 2024-12-18 DIAGNOSIS — Z87.74 H/O BICUSPID AORTIC VALVE: Primary | ICD-10-CM

## 2024-12-18 NOTE — TELEPHONE ENCOUNTER
Left voicemail notifying patient that his echo did not show any concerning abnormalities. Recommended repeat echo prior to appointment on 10/1/25. Advised patient to return call with questions.

## 2024-12-30 DIAGNOSIS — E78.2 MIXED HYPERLIPIDEMIA: ICD-10-CM

## 2024-12-30 RX ORDER — METOPROLOL TARTRATE 25 MG/1
12.5 TABLET, FILM COATED ORAL EVERY 12 HOURS
Qty: 90 TABLET | Refills: 0 | Status: SHIPPED | OUTPATIENT
Start: 2024-12-30 | End: 2025-01-02

## 2024-12-30 RX ORDER — ATORVASTATIN CALCIUM 20 MG/1
20 TABLET, FILM COATED ORAL
Qty: 90 TABLET | Refills: 0 | Status: SHIPPED | OUTPATIENT
Start: 2024-12-30

## 2025-01-02 RX ORDER — METOPROLOL TARTRATE 25 MG/1
12.5 TABLET, FILM COATED ORAL EVERY 12 HOURS
Qty: 90 TABLET | Refills: 0 | Status: SHIPPED | OUTPATIENT
Start: 2025-01-02

## 2025-01-03 DIAGNOSIS — E78.2 MIXED HYPERLIPIDEMIA: ICD-10-CM

## 2025-01-03 RX ORDER — ATORVASTATIN CALCIUM 20 MG/1
20 TABLET, FILM COATED ORAL
Qty: 90 TABLET | Refills: 0 | OUTPATIENT
Start: 2025-01-03

## 2025-01-21 NOTE — PROGRESS NOTES
POD #2 ascending aortic replacement and aortic valve repair  No complaints  Vitals are stable  Net fluid balance +226 cc  Chest tubes, 80 cc in 8 hours  Lungs are clear bilaterally, cor is regular, dressings are intact and feet are warm  Normal course, increase activity, remove chest tubes, central line and [place] catheter.  Low-dose p.o. Lasix and discontinue insulin drip.   Physical Therapy Treatment Note  [facility]  A department [place] [address] [address], [place] [address]    Patient: Carlos Hayes                                                 Visit Date: 2025  :     1956    Referring practitioner:    Chris Russ MD  Date of Initial Visit:          Type: THERAPY  Episode: Neck strain; left hip pain    Visit Diagnoses:    ICD-10-CM ICD-9-CM   1. Left hip pain  M25.552 719.45   2. Strain of neck muscle, initial encounter  S16.1XXA 847.0   3. Cervicalgia  M54.2 723.1   4. Chronic bilateral low back pain with bilateral sciatica  M54.42 724.2    M54.41 724.3    G89.29 338.29       SUBJECTIVE     Subjective:  He says his left lumbar has been more sore. He canceled his massage tomorrow as it may be too much hands on care.     PAIN: 10 before       OBJECTIVE     Objective     Manual Therapy     [id number]  Comments   Percussive IASTM using Powerboost right CT jucntion/UT and oliver piri at L2 using Y and flat attachment      Oliver  UT STM     Sidelying CT ext/rotation mobs oliver            Timed Minutes 45        Therapy Education/Self Care 80662   Education offered today Try hip abd and gentle hip flex/ext in pool instead of squats   VelociData Code Access Code: [email or web address]  URL: [email or web address]   Ongoing HEP       Timed Minutes        Total Timed Treatment:     45  mins  Total Time of Visit:             45   mins     ASSESSMENT/PLAN      GOALS:  Goals                                          Progress Note due by 25                                                      Recert due by 3/9/25   LTG by: 12 weeks Comments Date Status   Improve oliver cervical rotation to 50 deg LR 25, RR 30 deg  ongoing   Able to stand more upright with more neutral cervical More upright than a couple of weeks ago  ongoing   Improve passive hip ext to 10 deg Right hip ext to -10 deg / ongoing   Able to walk x 20 min without rollator  before sitting Jaziel forward 1/21 ongoing   Improve oliver shoulder elevation to 130 deg Not assessed 1/6 ongoing   Ind with HEP for flexibility and stability Working on anterior mobs of right pelvis in pool with a lunges stretch 1/6 ongoing       Anticipated CPT codes: Therapeutic Exercise 19277, Manual Therapy 28956, Therapeutic Activity 73866, Neuromuscular ReEducation 38578, and Self Care/Home Management 95094      Assessment/Plan     ASSESSMENT:    His left was more flared today but his neck was better. He would like to try every other week for now as see how that goes.     PLAN:   Continue focus on CT joint mobs and stretch of his hip and progress with stability exs. Back off to every other week but be ready to fit him in as needed.     SIGNATURE: Juarez Hartmann, PT, [place] License #: [id number]  Electronically Signed on 1/21/2025            [address] [place] [address] [place] [address]  [phone]

## 2025-03-20 DIAGNOSIS — E78.2 MIXED HYPERLIPIDEMIA: ICD-10-CM

## 2025-03-20 RX ORDER — ATORVASTATIN CALCIUM 20 MG/1
20 TABLET, FILM COATED ORAL
Qty: 90 TABLET | Refills: 1 | Status: SHIPPED | OUTPATIENT
Start: 2025-03-20

## 2025-03-20 RX ORDER — METOPROLOL TARTRATE 25 MG/1
12.5 TABLET, FILM COATED ORAL EVERY 12 HOURS
Qty: 90 TABLET | Refills: 1 | Status: SHIPPED | OUTPATIENT
Start: 2025-03-20

## 2025-03-20 NOTE — TELEPHONE ENCOUNTER
Caller: Sami Quiroga    Relationship: Self    Best call back number: [phone]    Requested Prescriptions:   Requested Prescriptions     Pending Prescriptions Disp Refills    atorvastatin (LIPITOR) 20 MG tablet 90 tablet 0     Sig: Take 1 tablet by mouth every night at bedtime.    metoprolol tartrate (LOPRESSOR) 25 MG tablet 90 tablet 0     Sig: Take 0.5 tablets by mouth Every 12 (Twelve) Hours.        Pharmacy where request should be sent: [facility]/PH[facility]ACY #[id number] - [place], [place] - [address] - [phone] [facility] [phone] FX     Last office visit with prescribing clinician: 12/4/2024   Last telemedicine visit with prescribing clinician: Visit date not found   Next office visit with prescribing clinician: 6/18/2025     Additional details provided by patient: PATIENT HAS ABOUT 3 WEEKS OF MEDICATION BUT HAS CHANGED TO NEW [facility] PH[facility] AND WANTED TO MAKE SURE THEY GET THE REFILLS.    Does the patient have less than a 3 day supply:  [] Yes  [x] No    Would you like a call back once the refill request has been completed: [] Yes [] No    If the office needs to give you a call back, can they leave a voicemail: [] Yes [] No    Carlos Reece Rep   03/20/25 15:26 EDT

## 2025-04-02 NOTE — PROGRESS NOTES
Subjective   Sami Quiroga is a 59 y.o. male.   Chief Complaint   Patient presents with    Hypertension    Hyperlipidemia    Annual Exam     History of Present Illness  The patient is here: for coordination of medical care.  Patient has: moderate activity with work/home activities, good appetite, feels well with minor complaints, good energy level, and is sleeping well    TDAP/TD VACCINES(1 - Tdap) Never done  Pneumococcal Vaccine 50+(1 of 1 - PCV) Never done  ZOSTER VACCINE(1 of 2) Never done  ANNUAL PHYSICAL due on 10/18/2023  COVID-19 Vaccine(4 - 2024-25 season) due on 09/01/2024  INFLUENZA VACCINE due on 07/01/2025  LIPID PANEL due on 08/21/2025  COLORECTAL CANCER SCREENING due on 07/29/2026  HEPATITIS C SCREENING Completed  Current pain scale 0/10.          The following portions of the patient's history were reviewed and updated as appropriate: allergies, current medications, past family history, past medical history, past social history, past surgical history, and problem list.    Patient Active Problem List   Diagnosis    Heart murmur    Hypertension    Hyperlipidemia    Nonrheumatic aortic valve insufficiency    Thoracic aortic aneurysm without rupture    Thoracic aortic ectasia    Nocturia    Aortic valve disorder    Aortic root dilation    Anxiety    Allergic rhinitis    Aneurysm of ascending aorta    H/O bicuspid aortic valve    Postoperative atrial fibrillation    Prolonged Q-T interval on ECG    S/P ascending aortic aneurysm repair with AV repair by Dr. Alex 8/22/2024       Current Outpatient Medications on File Prior to Visit   Medication Sig Dispense Refill    aspirin 81 MG EC tablet Take 1 tablet by mouth Daily. 30 tablet 2    atorvastatin (LIPITOR) 20 MG tablet Take 1 tablet by mouth every night at bedtime. 90 tablet 1    metoprolol tartrate (LOPRESSOR) 25 MG tablet Take 0.5 tablets by mouth Every 12 (Twelve) Hours. 90 tablet 1    tamsulosin (FLOMAX) 0.4 MG capsule 24 hr capsule Take 1 capsule  "by mouth Daily.       No current facility-administered medications on file prior to visit.     Current outpatient and discharge medications have been reconciled for the patient.  Reviewed by: Renard Mcintosh MD      No Known Allergies    Review of Systems   Constitutional:  Negative for activity change, appetite change, fatigue and fever.   HENT:  Negative for ear pain, swollen glands and voice change.    Eyes:  Negative for visual disturbance.   Respiratory:  Negative for shortness of breath and wheezing.    Cardiovascular:  Negative for chest pain and leg swelling.   Gastrointestinal:  Negative for abdominal pain, blood in stool, constipation, diarrhea, nausea and vomiting.   Endocrine: Negative for polydipsia and polyuria.   Genitourinary:  Negative for dysuria, frequency and hematuria.   Musculoskeletal:  Negative for joint swelling, neck pain and neck stiffness.   Skin:  Negative for rash and wound.   Neurological:  Negative for weakness, numbness and headache.   Psychiatric/Behavioral:  Negative for suicidal ideas and depressed mood.      I have reviewed and confirmed the accuracy of the ROS as documented by the MA/LPN/RN Renard Mcintosh MD    Objective   Visit Vitals  /90 (BP Location: Right arm, Patient Position: Sitting, Cuff Size: Large Adult)   Pulse 86   Resp 20   Ht 180.3 cm (71\")   Wt 91.6 kg (202 lb)   SpO2 97%   BMI 28.17 kg/m²      **  Physical Exam  Constitutional:       Appearance: He is well-developed.   HENT:      Head: Normocephalic and atraumatic.      Right Ear: External ear normal.      Left Ear: External ear normal.      Nose: Nose normal.   Eyes:      Pupils: Pupils are equal, round, and reactive to light.   Cardiovascular:      Rate and Rhythm: Normal rate and regular rhythm.      Heart sounds: Normal heart sounds.   Pulmonary:      Effort: Pulmonary effort is normal.      Breath sounds: Normal breath sounds.   Abdominal:      General: Bowel sounds are normal.      " Palpations: Abdomen is soft.   Musculoskeletal:         General: Normal range of motion.      Cervical back: Normal range of motion and neck supple.   Skin:     General: Skin is warm and dry.   Neurological:      Mental Status: He is alert and oriented to person, place, and time.   Psychiatric:         Behavior: Behavior normal.         Thought Content: Thought content normal.         Judgment: Judgment normal.       Derm Physical Exam  Ortho Exam   Neurological Exam  Mental Status  Alert. Oriented to person, place, and time.    Cranial Nerves  CN III, IV, VI: Pupils equal round and reactive to light bilaterally.         Assessment & Plan  Encounter for wellness examination    Orders:    CBC & Differential    Comprehensive Metabolic Panel    TSH    Lipid Panel With / Chol / HDL Ratio    PSA Screen    Primary hypertension  Stable, continue current management.     Orders:    CBC & Differential    Comprehensive Metabolic Panel    Mixed hyperlipidemia   Lipid abnormalities are stable    Plan:  Continue same medication/s without change.        Orders:    Comprehensive Metabolic Panel    TSH    Lipid Panel With / Chol / HDL Ratio    Prostate cancer screening    Orders:    PSA Screen     Discussed anticipatory guidance, diet, exercise, and weight loss.  Discussed safety and routine screening examinations.  Discussed self-examinations.  Sami Quiroga  reports that he quit smoking about 35 years ago. His smoking use included cigarettes. He started smoking about 39 years ago. He has a 4 pack-year smoking history. He has never been exposed to tobacco smoke. He has never used smokeless tobacco. Follow-up for routine health maintenance as indicated. Follow up in 6 months for recheck, sooner for worsening symptoms or any concerns.      BMI is >= 25 and <30. (Overweight) The following options were offered after discussion;: weight loss educational material (shared in after visit summary)      Expected course, medications, and  adverse effects discussed as appropriate.  Call or return if worsening or persistent symptoms.     This document is intended for medical professional use only.   Electronically signed by Renard Mcintosh MD on 04/03/2025. This content may not have been proofread.

## 2025-04-03 ENCOUNTER — OFFICE VISIT (OUTPATIENT)
Dept: FAMILY MEDICINE CLINIC | Facility: CLINIC | Age: 60
End: 2025-04-03
Payer: COMMERCIAL

## 2025-04-03 VITALS
DIASTOLIC BLOOD PRESSURE: 86 MMHG | RESPIRATION RATE: 20 BRPM | HEART RATE: 86 BPM | SYSTOLIC BLOOD PRESSURE: 136 MMHG | OXYGEN SATURATION: 97 % | BODY MASS INDEX: 28.28 KG/M2 | WEIGHT: 202 LBS | HEIGHT: 71 IN

## 2025-04-03 DIAGNOSIS — Z12.5 PROSTATE CANCER SCREENING: ICD-10-CM

## 2025-04-03 DIAGNOSIS — E78.2 MIXED HYPERLIPIDEMIA: ICD-10-CM

## 2025-04-03 DIAGNOSIS — I10 PRIMARY HYPERTENSION: ICD-10-CM

## 2025-04-03 DIAGNOSIS — Z00.00 ENCOUNTER FOR WELLNESS EXAMINATION: Primary | ICD-10-CM

## 2025-04-03 PROCEDURE — 99396 PREV VISIT EST AGE 40-64: CPT | Performed by: FAMILY MEDICINE

## 2025-04-03 NOTE — ASSESSMENT & PLAN NOTE
Lipid abnormalities are stable    Plan:  Continue same medication/s without change.        Orders:    Comprehensive Metabolic Panel    TSH    Lipid Panel With / Chol / HDL Ratio

## 2025-04-04 ENCOUNTER — RESULTS FOLLOW-UP (OUTPATIENT)
Dept: FAMILY MEDICINE CLINIC | Facility: CLINIC | Age: 60
End: 2025-04-04
Payer: COMMERCIAL

## 2025-04-04 LAB
ALBUMIN SERPL-MCNC: 4.9 G/DL (ref 3.8–4.9)
ALP SERPL-CCNC: 97 IU/L (ref 44–121)
ALT SERPL-CCNC: 30 IU/L (ref 0–44)
AST SERPL-CCNC: 19 IU/L (ref 0–40)
BASOPHILS # BLD AUTO: 0.1 X10E3/UL (ref 0–0.2)
BASOPHILS NFR BLD AUTO: 1 %
BILIRUB SERPL-MCNC: 0.9 MG/DL (ref 0–1.2)
BUN SERPL-MCNC: 14 MG/DL (ref 6–24)
BUN/CREAT SERPL: 12 (ref 9–20)
CALCIUM SERPL-MCNC: 9.6 MG/DL (ref 8.7–10.2)
CHLORIDE SERPL-SCNC: 103 MMOL/L (ref 96–106)
CHOLEST SERPL-MCNC: 153 MG/DL (ref 100–199)
CHOLEST/HDLC SERPL: 4 RATIO (ref 0–5)
CO2 SERPL-SCNC: 23 MMOL/L (ref 20–29)
CREAT SERPL-MCNC: 1.14 MG/DL (ref 0.76–1.27)
EGFRCR SERPLBLD CKD-EPI 2021: 75 ML/MIN/1.73
EOSINOPHIL # BLD AUTO: 0.1 X10E3/UL (ref 0–0.4)
EOSINOPHIL NFR BLD AUTO: 2 %
ERYTHROCYTE [DISTWIDTH] IN BLOOD BY AUTOMATED COUNT: 14.4 % (ref 11.6–15.4)
GLOBULIN SER CALC-MCNC: 2.7 G/DL (ref 1.5–4.5)
GLUCOSE SERPL-MCNC: 107 MG/DL (ref 70–99)
HCT VFR BLD AUTO: 48.1 % (ref 37.5–51)
HDLC SERPL-MCNC: 38 MG/DL
HGB BLD-MCNC: 15.7 G/DL (ref 13–17.7)
IMM GRANULOCYTES # BLD AUTO: 0 X10E3/UL (ref 0–0.1)
IMM GRANULOCYTES NFR BLD AUTO: 0 %
LDLC SERPL CALC-MCNC: 73 MG/DL (ref 0–99)
LYMPHOCYTES # BLD AUTO: 1.4 X10E3/UL (ref 0.7–3.1)
LYMPHOCYTES NFR BLD AUTO: 24 %
MCH RBC QN AUTO: 28.5 PG (ref 26.6–33)
MCHC RBC AUTO-ENTMCNC: 32.6 G/DL (ref 31.5–35.7)
MCV RBC AUTO: 88 FL (ref 79–97)
MONOCYTES # BLD AUTO: 0.4 X10E3/UL (ref 0.1–0.9)
MONOCYTES NFR BLD AUTO: 7 %
NEUTROPHILS # BLD AUTO: 4 X10E3/UL (ref 1.4–7)
NEUTROPHILS NFR BLD AUTO: 66 %
PLATELET # BLD AUTO: 181 X10E3/UL (ref 150–450)
POTASSIUM SERPL-SCNC: 4.6 MMOL/L (ref 3.5–5.2)
PROT SERPL-MCNC: 7.6 G/DL (ref 6–8.5)
PSA SERPL-MCNC: 5.3 NG/ML (ref 0–4)
RBC # BLD AUTO: 5.5 X10E6/UL (ref 4.14–5.8)
SODIUM SERPL-SCNC: 142 MMOL/L (ref 134–144)
TRIGL SERPL-MCNC: 261 MG/DL (ref 0–149)
TSH SERPL DL<=0.005 MIU/L-ACNC: 2.83 UIU/ML (ref 0.45–4.5)
VLDLC SERPL CALC-MCNC: 42 MG/DL (ref 5–40)
WBC # BLD AUTO: 6 X10E3/UL (ref 3.4–10.8)

## 2025-06-18 ENCOUNTER — OFFICE VISIT (OUTPATIENT)
Dept: CARDIOLOGY | Facility: CLINIC | Age: 60
End: 2025-06-18
Payer: COMMERCIAL

## 2025-06-18 VITALS
BODY MASS INDEX: 28.87 KG/M2 | HEIGHT: 71 IN | HEART RATE: 73 BPM | WEIGHT: 206.2 LBS | SYSTOLIC BLOOD PRESSURE: 122 MMHG | OXYGEN SATURATION: 96 % | DIASTOLIC BLOOD PRESSURE: 80 MMHG

## 2025-06-18 DIAGNOSIS — I77.810 THORACIC AORTIC ECTASIA: Primary | ICD-10-CM

## 2025-06-18 DIAGNOSIS — Z98.890 S/P ASCENDING AORTIC ANEURYSM REPAIR: ICD-10-CM

## 2025-06-18 DIAGNOSIS — I10 PRIMARY HYPERTENSION: ICD-10-CM

## 2025-06-18 DIAGNOSIS — Z87.74 H/O BICUSPID AORTIC VALVE: ICD-10-CM

## 2025-06-18 DIAGNOSIS — I97.89 POSTOPERATIVE ATRIAL FIBRILLATION: ICD-10-CM

## 2025-06-18 DIAGNOSIS — Z86.79 S/P ASCENDING AORTIC ANEURYSM REPAIR: ICD-10-CM

## 2025-06-18 DIAGNOSIS — I48.91 POSTOPERATIVE ATRIAL FIBRILLATION: ICD-10-CM

## 2025-06-18 DIAGNOSIS — I35.1 NONRHEUMATIC AORTIC VALVE INSUFFICIENCY: ICD-10-CM

## 2025-06-18 DIAGNOSIS — I71.21 ANEURYSM OF ASCENDING AORTA WITHOUT RUPTURE: ICD-10-CM

## 2025-06-18 DIAGNOSIS — R01.1 HEART MURMUR: ICD-10-CM

## 2025-06-18 DIAGNOSIS — E78.2 MIXED HYPERLIPIDEMIA: ICD-10-CM

## 2025-06-18 NOTE — PROGRESS NOTES
Cardiology Office Visit      Encounter Date:  06/18/2025    Patient ID:   Sami Quiroga is a 59 y.o. male.    Reason For Followup:  Aortic regurgitation and ascending aortic aneurysm  Status post surgical repair    Brief Clinical History:  Dear Renard Armijo MD    I had the pleasure of seeing Sami Quiroga today. As you are well aware, this is a 59 y.o. male  with history of aortic valve disorder with aortic insufficiency post surgical repair here for follow-up        Interval History:  Denies any symptoms of chest pain shortness of breath dizziness or syncope  Denies any new cardiac symptoms  No significant change in the functional status  Compliant with medical therapy  Assessment & Plan    Impressions:  Aortic regurgitation, moderate/status post surgical repair  Bicuspid aortic valve  Ascending aortic aneurysm/status post surgical repair  Hypertension  Moderate aortic insufficiency/status post surgical repair  Elective ascending aortic aneurysm repair with a 30 mm graft, aortic valve repair with plication of conjoined leaflet, release of the raphae of same leaflet and narrowing of the sinotublar junction symmetrically to 30 mm, and left atrial appendage epicardial closure with 35 mm atrial clip device        Left ventricular systolic function is normal. Left ventricular ejection fraction appears to be 56 - 60%.    Left ventricular diastolic function was normal.    There is moderate calcification of the aortic valve mainly affecting the right coronary cusp(s).    Peak velocity of the flow distal to the aortic valve is 204.2 cm/s. Aortic valve maximum pressure gradient is 17 mmHg. Aortic valve mean pressure gradient is 9 mmHg.    Estimated right ventricular systolic pressure from tricuspid regurgitation is normal (<35 mmHg).  Repeat echocardiogram with normal functioning of the aortic valve with no significant residual aortic stenosis    Recommendations:  Continue current medical therapy  Continue  "aggressive risk factor modification  Patient is scheduled for a CT of the aorta to further assess the prior repair of the ascending aortic aneurysm by the surgical team  Need for antibiotic prophylaxis for dental procedures reviewed and discussed with patient  Prior available medical records reviewed and discussed with patient  Results of the echocardiogram reviewed and discussed with patient  Continue current medical therapy continue aggressive risk factor modification  Recent labs reviewed and discussed with patient  follow-up in office in 6 months        Vitals:  Vitals:    06/18/25 1007   BP: 122/80   BP Location: Left arm   Patient Position: Sitting   Cuff Size: Adult   Pulse: 73   SpO2: 96%   Weight: 93.5 kg (206 lb 3.2 oz)   Height: 180.3 cm (71\")       Physical Exam:    General: Alert, cooperative, no distress, appears stated age  Head:  Normocephalic, atraumatic, mucous membranes moist  Eyes:  Conjunctiva/corneas clear, EOM's intact     Neck:  Supple,  no adenopathy;      Lungs: Clear to auscultation bilaterally, no wheezes rhonchi rales are noted  Chest wall: No tenderness  Heart::  Regular rate and rhythm, S1 and S2 normal, no murmur, rub or gallop  Abdomen: Soft, non-tender, nondistended bowel sounds active  Extremities: No cyanosis, clubbing, or edema  Pulses: 2+ and symmetric all extremities  Skin:  No rashes or lesions  Neuro/psych: A&O x3. CN II through XII are grossly intact with appropriate affect              Lab Results   Component Value Date    GLUCOSE 107 (H) 04/03/2025    BUN 14 04/03/2025    CREATININE 1.14 04/03/2025    EGFR 75 04/03/2025    BCR 12 04/03/2025    K 4.6 04/03/2025    CO2 23 04/03/2025    CALCIUM 9.6 04/03/2025    ALBUMIN 4.9 04/03/2025    BILITOT 0.9 04/03/2025    AST 19 04/03/2025    ALT 30 04/03/2025     Results for orders placed during the hospital encounter of 12/16/24    Adult Transthoracic Echo Complete w/ Color, Spectral and Contrast if necessary per " protocol    Interpretation Summary    Left ventricular systolic function is normal. Left ventricular ejection fraction appears to be 56 - 60%.    Left ventricular diastolic function was normal.    There is moderate calcification of the aortic valve mainly affecting the right coronary cusp(s).    Peak velocity of the flow distal to the aortic valve is 204.2 cm/s. Aortic valve maximum pressure gradient is 17 mmHg. Aortic valve mean pressure gradient is 9 mmHg.    Estimated right ventricular systolic pressure from tricuspid regurgitation is normal (<35 mmHg).     Results for orders placed during the hospital encounter of 06/17/24    Cardiac Catheterization/Vascular Study    Conclusion  Table formatting from the original result was not included.  Cardiac Catheterization Operative Report    Sami Quiroga  [id number]  6/17/2024  @PCP@    He underwent cardiac catheterization.    Indications for the procedure include: Ascending aortic aneurysm and aortic regurgitation plans for surgical intervention.    Results for orders placed during the hospital encounter of 12/18/23    Adult Transthoracic Echo Complete W/ Cont if Necessary Per Protocol    Interpretation Summary    Left ventricular systolic function is normal. Calculated left ventricular EF = 63% Left ventricular ejection fraction appears to be 61 - 65%.    Left ventricular diastolic function is consistent with (grade I) impaired relaxation.    The left atrial cavity is mild to moderately dilated.    Estimated right ventricular systolic pressure from tricuspid regurgitation is mildly elevated (35-45 mmHg).    Moderate dilation of the aortic root is present. Severe dilation of the ascending aorta is present.    Ascending aortic aneurysm measuring 4.4 cm    No results found for this or any previous visit.        Procedure Details:  The risks, benefits, complications, treatment options, and expected outcomes were discussed with the patient. The patient and/or family  concurred with the proposed plan, giving informed consent.    After informed consent the patient was brought to the cath lab after appropriate IV hydration was begun and oral premedication was given. He was further sedated with fentanyl. He was prepped and draped in the usual manner. Using the modified Seldinger access technique, a 6 Hungarian sheath was placed in the femoral artery. A left heart catheterization with coronary arteriography was performed. Findings are discussed below.  We also attempted a right radial access but unable to engage the left main coronary artery secondary to ascending aorta aneurysm and tortuous anatomy    After the procedure was completed, sedation was stopped and the sheaths and catheters were all removed. Hemostasis was achieved per established hospital protocols.    Conscious sedation:  Conscious sedation was performed according to protocol.  I supervised and directed an independent trained observer with the assistance of monitoring the patient's level of consciousness and physiologic status throughout the procedure.  Intraoperative service time was 60 minutes.    Findings:    Hemodynamics Central aortic pressure systolic 104 diastolic 76 with a mean pressure of 90 mmHg    Left Main Large-caliber vessel angiographically normal trifurcates into left into descending left circumflex artery and moderate caliber ramus intermediate branch  RCA Large-caliber vessel inferior and anterior takeoff with no evidence of any angiographic stenosis  Right coronary artery is angiographically normal right coronary artery provides a large caliber PDA and PLV branches that are angiographically normal  LAD Large-caliber vessel angiographically normal  First diagonal branch of the LAD is a moderate caliber vessel angiographically normal  Second diagonal branch of the LAD is a moderate caliber vessel angiographically normal  Circ Large-caliber vessel angiographically normal  Moderate caliber ramus  intermediate branch angiographically normal  LV Not done  Coronary Dominance Right coronary artery    Estimated Blood Loss:  Minimal    Specimens: None    Complications:  None; patient tolerated the procedure well.    Disposition: PACU - hemodynamically stable.    Condition: stable    Impressions:  Normal coronaries    Recommendations:  Surgical evaluation for consideration for ascending aortic aneurysm repair and aortic valve repair  Test results reviewed and discussed with patient and family     Lab Results   Component Value Date    CHOL 130 2024    CHLPL 153 2025    TRIG 261 (H) 2025    HDL 38 (L) 2025    LDL 73 2025      Results for orders placed during the hospital encounter of 21    Treadmill Stress Test    Interpretation Summary  · No ECG evidence of myocardial ischemia. Negative clinical evidence of myocardial ischemia. Findings consistent with a normal ECG stress test.   Results for orders placed in visit on 17    CARDIOVASCULAR STUDIES - CONVERTED    Narrative  Adult Echocardiogram Report  [address] [address]  Name: MARI ERVIN EStudy Date: 2017 08:37 AM  MRN: [id number]        Patient Location:   : 1965       Gender: Male                              Height: 71 in  Age: 52 yrs           Account#: [id number]                     Weight: 186 lb  Reason For Study: Aortic valve disorder  BSA: 2.0 m2  History: HYPERTENSION, MURMUR, AORTIC ECTASIA  Ordering Physician:  ERNIE JASSO  Referring Physician:  QUEENIE LEUNG  Performed By: CHAYITO  M-Mode/2-D Measurements:  LVIDd: 5.0 cm       (3.7-5.7) LVPWd: 1.0 cm        (0.8-1.2)  LVIDs: 3.5 cm       (2.3-3.9)  ACS: 2.0 cm         (1.6-3.7) IVSd: 1.1 cm         (0.7-1.2)  LA dimension: 2.5 cm(1.9-4.0) RVDd: 1.7 cm         (0.7-2.4)  FS: 31.6 %          (21-40%)  Ao root diam: 3.7 cm (2.0-3.7)  Comments  The left ventricle is normal in size. There is normal  left ventricular wall  thickness. Left ventricular systolic function is normal. Ejection Fraction is  60-65%. The left ventricular wall motion is normal. The right ventricle is  normal size. The right ventricular systolic function is normal. The left  atrial size is normal. Right atrial size is normal. The mitral valve is  grossly normal in structure. There is no mitral valve stenosis. There is mild  mitral regurgitation. The tricuspid valve is not well visualized, but is  grossly normal. There is no tricuspid stenosis. There is mild tricuspid  regurgitation. Right ventricular systolic pressure is elevated at 40-50mmHg.  Aortic valve is trileaflet with satisfactory opening and coaptation. The  aortic valve opens well. No hemodynamically significant valvular aortic  stenosis. Mild to moderate aortic regurgitation. The pulmonic valve is not  well visualized. There is no pulmonic valvular stenosis. Trace pulmonic  valvular regurgitation. The aortic root is normal size. Dilated ascending  aorta ascending aorta measures 4.3 cm consider dedicated CT scan for further  evaluation. There is no pericardial effusion.  Interpretation  Dilated ascending aorta ascending aorta measures 4.3 cm consider dedicated CT  scan for further evaluation  Right ventricular systolic pressure is elevated at 40-50mmHg.  Left ventricular systolic function is normal.  Ejection Fraction is 60-65%  The left ventricular wall motion is normal.  MMode/2D Measurements T Calculations  ESV(Teich): 49.2 ml                 Ao root area: 10.8 cm2  EF(Teich): 59.2 %  LVOT diam: 2.3 cm                   EDV(MOD-sp4): 127.8 ml  ESV(MOD-sp4): 52.3 ml  EF(MOD-sp4): 59.0 %  Doppler Measurements T Calculations  MV E max vu: 93.7 cm/sec             MV max P.7 mmHg  MV A max vu: 84.4 cm/sec             MV mean P.5 mmHg  MV E/A: 1.1  Ao V2 max: 169.1 cm/sec               AI max vu: 428.9 cm/sec  Ao max P.4 mmHg                  AI max P.1 mmHg  Ao  V2 mean: 110.3 cm/sec              AI dec slope: 223.7 cm/sec2  Ao mean P.7 mmHg                  AI dec time: 1.9 sec  Ao V2 VTI: 38.1 cm                    AI P1/2t: 561.5 msec  KAVITHA(I,D): 3.0 cm2  KAVITHA(V,D): 2.8 cm2  LV V1 max P.3 mmHg                MR max vu: 473.1 cm/sec  LV V1 mean P.9 mmHg               MR max P.6 mmHg  LV V1 max: 115.4 cm/sec  LV V1 mean: 78.3 cm/sec  LV V1 VTI: 27.7 cm  PA max PG: 3.4 mmHg                   PI end-d vu: 78.4 cm/sec  TR max vu: 264.3 cm/sec  TR max P.7 mmHg  RVSP(TR): 41.7 mmHg  _______________________________________________________________________________  Electronically signed by: MD Brittanie Lopez  on 2017 12:26 PM    Electronically signed by Brittanie Lopez MD on 2017 at 1:58 PM  ________________________________________________________________________      Disclaimer: Converted Note message may not contain all data elements that existed in the legacy source system. Please see [employer] for the original note details.           Objective:          Allergies:  No Known Allergies    Medication Review:     Current Outpatient Medications:     atorvastatin (LIPITOR) 20 MG tablet, Take 1 tablet by mouth every night at bedtime., Disp: 90 tablet, Rfl: 1    metoprolol tartrate (LOPRESSOR) 25 MG tablet, Take 0.5 tablets by mouth Every 12 (Twelve) Hours., Disp: 90 tablet, Rfl: 1    tamsulosin (FLOMAX) 0.4 MG capsule 24 hr capsule, Take 1 capsule by mouth Daily., Disp: , Rfl:     aspirin 81 MG EC tablet, Take 1 tablet by mouth Daily., Disp: 30 tablet, Rfl: 2    Family History:  Family History   Problem Relation Age of Onset    Hypertension Mother     Hypertension Father        Past Medical History:  Past Medical History:   Diagnosis Date    Aortic insufficiency due to bicuspid aortic valve     Ascending aortic aneurysm     Bicuspid aortic valve     Heart murmur     Hyperlipidemia     Hypertension        Past surgical  History:  Past Surgical History:   Procedure Laterality Date    APPENDECTOMY      ASCENDING AORTIC ANEURYSM REPAIR N/A 2024    Procedure: ASCENDING AORTIC ARCH replaced with 30mm gelweave graft SN [id number], left atrial appendage exclusion with 35mm atriclip,  with intraoperative CHACE;  Surgeon: Carlos Alex MD;  Location: [facility] CVOR;  Service: Cardiothoracic;  Laterality: N/A;    CARDIAC CATHETERIZATION Right 2024    Procedure: Left Heart Cath, RADIAL;  Surgeon: Brittanie Lopez MD;  Location: [facility] CATH INVASIVE LOCATION;  Service: Cardiovascular;  Laterality: Right;  cath in 2024       Social History:  Social History     Socioeconomic History    Marital status:    Tobacco Use    Smoking status: Former     Current packs/day: 0.00     Average packs/day: 1 pack/day for 4.0 years (4.0 ttl pk-yrs)     Types: Cigarettes     Start date: 1986     Quit date: 1990     Years since quittin.4     Passive exposure: Never    Smokeless tobacco: Never   Vaping Use    Vaping status: Never Used   Substance and Sexual Activity    Alcohol use: Yes     Comment: Rare     Drug use: No    Sexual activity: Defer       Review of Systems:  The following systems were reviewed as they relate to the cardiovascular system: Constitutional, Eyes, ENT, Cardiovascular, Respiratory, Gastrointestinal, Integumentary, Neurological, Psychiatric, Hematologic, Endocrine, Musculoskeletal, and Genitourinary. The pertinent cardiovascular findings are reported above with all other cardiovascular points within those systems being negative.    Diagnostic Study Review:     Current Electrocardiogram:    ECG 12 Lead    Date/Time: 2025 11:06 AM  Performed by: Brittanie Lopez MD    Authorized by: Britatnie Lopez MD  Comparison: compared with previous ECG   Similar to previous ECG  Rhythm: sinus rhythm  Rate: normal  BPM: 73  Conduction: conduction normal  ST Segments: ST segments normal  T  Waves: T waves normal  QRS axis: normal    Clinical impression: normal ECG                NOTE: The following portions of the patient's history were reviewed and updated this visit as appropriate: allergies, current medications, past family history, past medical history, past social history, past surgical history and problem list.

## (undated) DEVICE — SUT PDS2 0 CT1 27IN Z340H MF VIL

## (undated) DEVICE — TPE UMB COTN 1/8X36IN STRL

## (undated) DEVICE — SUT SILK 4/0 TIES 18IN A183H

## (undated) DEVICE — Device

## (undated) DEVICE — 500ML,PRESSURE INFUSER W/STOPCOCK: Brand: MEDLINE

## (undated) DEVICE — SUT PROLN 5/0 RB2 D/A 30IN 8710H

## (undated) DEVICE — BIOPATCH™ ANTIMICROBIAL DRESSING WITH CHLORHEXIDINE GLUCONATE IS A HYDROPHILLIC POLYURETHANE ABSORPTIVE FOAM WITH CHLORHEXIDINE GLUCONATE (CHG) WHICH INHIBITS BACTERIAL GROWTH UNDER THE DRESSING. THE DRESSING IS INTENDED TO BE USED TO ABSORB EXUDATE, COVER A WOUND CAUSED BY VASCULAR AND NONVASCULAR PERCUTANEOUS MEDICAL DEVICES DURING SURGERY, AS WELL AS REDUCE LOCAL INFECTION AND COLONIZATION OF MICROORGANISMS.: Brand: BIOPATCH

## (undated) DEVICE — GAUZE,SPONGE,4"X4",32PLY,XRAY,STRL,LF: Brand: MEDLINE

## (undated) DEVICE — INTENDED FOR TISSUE SEPARATION, AND OTHER PROCEDURES THAT REQUIRE A SHARP SURGICAL BLADE TO PUNCTURE OR CUT.: Brand: BARD-PARKER ® CARBON RIB-BACK BLADES

## (undated) DEVICE — CVR PROB ULTRASND CIVFLEX GEN/PURP TELESCP/FOLD 5.5X96IN LF

## (undated) DEVICE — PRESSURE TUBING: Brand: TRUWAVE

## (undated) DEVICE — CATH ART RADL 20GA 1 3/4IN LF

## (undated) DEVICE — PK OPN HEART WHT WRP 50

## (undated) DEVICE — CATHETER,URETHRAL,REDRUBBER,STERILE,20FR: Brand: MEDLINE

## (undated) DEVICE — CANN AORT ROOT DLP VNT/8IN 14G 7F

## (undated) DEVICE — SUT PROLN 4/0 V7 36IN 8975H

## (undated) DEVICE — SUT SILK 2/0 SH CR8 18IN CR8 C012D

## (undated) DEVICE — CATH DIAG IMPULSE FL4 6F 100CM

## (undated) DEVICE — CONNECT Y INTERSEPT W/LL 3/8 X 3/8 X 3/8IN

## (undated) DEVICE — PK ATS CUST W CARDIOTOMY RESEVOIR

## (undated) DEVICE — CATH DIAG IMPULSE FR4 6F 100CM

## (undated) DEVICE — SUT SILK 2/0 TIES 18IN A185H

## (undated) DEVICE — SAFELINER OUTER SHELL SUCTION CANISTER: Brand: DEROYAL

## (undated) DEVICE — CATH TDILUT SWANGANZ VIP 7.5F 110CM

## (undated) DEVICE — SOL IRR NACL 0.9PCT BT 1000ML

## (undated) DEVICE — SUT PROLN 5/0 V5 36IN 8934H

## (undated) DEVICE — SENSR CERBRL O2 PK/2

## (undated) DEVICE — ANTIBACTERIAL UNDYED BRAIDED (POLYGLACTIN 910), SYNTHETIC ABSORBABLE SUTURE: Brand: COATED VICRYL

## (undated) DEVICE — BOOT,SUTURE,STANDARD,YELLOW-IN-BLUE: Brand: MEDLINE

## (undated) DEVICE — HEMOCONCENTRATOR PERFUS LPS06

## (undated) DEVICE — ST ACC MICROPUNCTURE STFF/CANN PLAT/TP 4F 21G 40CM

## (undated) DEVICE — SUT SILK 0 CT1 CR8 18IN C021D

## (undated) DEVICE — ST TOURNI COMPL A/ 7IN

## (undated) DEVICE — SUT PROLN 3/0 V7 D/A 36IN 8976H

## (undated) DEVICE — SUP ARMBRD HANDAID ART/LINE 9IN

## (undated) DEVICE — CATH DIAG IMPULSE FR4 5F 100CM

## (undated) DEVICE — SUT SILK 2 SUTUPAK TIE 60IN SA8H 2STRAND

## (undated) DEVICE — SUT PROLN 7/0 BV1 D/A 30IN 8703H

## (undated) DEVICE — PINNACLE INTRODUCER SHEATH: Brand: PINNACLE

## (undated) DEVICE — TOWEL,OR,DSP,ST,WHITE,DLX,4/PK,20PK/CS: Brand: MEDLINE

## (undated) DEVICE — GLV SURG BIOGEL LTX PF 7 1/2

## (undated) DEVICE — 3M™ TEGADERM™ IV TRANSPARENT FILM DRESSING WITH BORDER 100 BAGS/CARTON 4 CARTONS/CASE 1633: Brand: 3M™ TEGADERM™

## (undated) DEVICE — PK TRY HEART CATH 50

## (undated) DEVICE — GLIDESHEATH SLENDER ACCESS KIT: Brand: GLIDESHEATH SLENDER

## (undated) DEVICE — DRAPE SHEET ULTRAGARD: Brand: MEDLINE

## (undated) DEVICE — CATH DIAG IMPULSE FL5 6F 100CM

## (undated) DEVICE — ST PERFUS M/

## (undated) DEVICE — ELECTRD DEFIB M/FUNC PROPADZ STRL 2PK

## (undated) DEVICE — TUBING, SUCTION, 1/4" X 12', STRAIGHT: Brand: MEDLINE

## (undated) DEVICE — SUT PROLN 6/0 RB2 D/A 30IN 8711H

## (undated) DEVICE — ELECTRD BLD EZ CLN STD 6.5IN

## (undated) DEVICE — 3M™ BAIR HUGGER® UNDERBODY BLANKET, FULL ACCESS, 10 PER CASE 63500: Brand: BAIR HUGGER™

## (undated) DEVICE — 28 FR STRAIGHT – SOFT PVC CATHETER: Brand: PVC THORACIC CATHETERS

## (undated) DEVICE — CATH DIAG IMPULSE FL3.5 6F 100CM

## (undated) DEVICE — IRRIGATOR BULB ASEPTO 60CC STRL

## (undated) DEVICE — KT CATH CV ACC MAC 2L SFTY 9F 4 1/2IN

## (undated) DEVICE — PRESSURE MONITORING SET: Brand: TRUWAVE, VAMP PLUS

## (undated) DEVICE — PK PERFUS CUST W/CARDIOPLEGIA

## (undated) DEVICE — SUT PROLN 4/0 V5 36IN 8935H

## (undated) DEVICE — CANN ART EOPA 3D NV W/CONN 20F

## (undated) DEVICE — SOL IRR H2O BTL 1000ML STRL

## (undated) DEVICE — COUNT NDL MAG XLG

## (undated) DEVICE — SPONGE,DISSECTOR,ROUND CHERRY,XR,ST,5/PK: Brand: MEDLINE

## (undated) DEVICE — CONTAINER,SPECIMEN,OR STERILE,4OZ: Brand: MEDLINE

## (undated) DEVICE — CANN RETRGR STYLET RSCP 15F

## (undated) DEVICE — TR BAND RADIAL ARTERY COMPRESSION DEVICE: Brand: TR BAND

## (undated) DEVICE — 3M™ TEGADERM™ I.V. ADVANCED SECUREMENT DRESSING, 1685, 3-1/2 IN X 4-1/2 IN (8.5 CM X 11.5 CM), 50/CT 4CT/CASE: Brand: 3M™ TEGADERM™

## (undated) DEVICE — BG BLD SYS

## (undated) DEVICE — SOL NACL 0.9PCT 1000ML

## (undated) DEVICE — DGW .035 FC J3MM 260CM TEF: Brand: EMERALD

## (undated) DEVICE — ST IV BLD W/HANDPUMP YSITE 125IN

## (undated) DEVICE — CABL BIPOL W/ALLGTR CLIP/SM 12FT

## (undated) DEVICE — SYS PERFUS SEP PLATLT W TIPS CUST

## (undated) DEVICE — BLOOD TRANSFUSION FILTER: Brand: HAEMONETICS